# Patient Record
Sex: FEMALE | Race: WHITE | NOT HISPANIC OR LATINO | Employment: OTHER | URBAN - METROPOLITAN AREA
[De-identification: names, ages, dates, MRNs, and addresses within clinical notes are randomized per-mention and may not be internally consistent; named-entity substitution may affect disease eponyms.]

---

## 2017-01-20 ENCOUNTER — LAB VISIT (OUTPATIENT)
Dept: LAB | Facility: HOSPITAL | Age: 67
End: 2017-01-20
Attending: UROLOGY
Payer: MEDICARE

## 2017-01-20 ENCOUNTER — TELEPHONE (OUTPATIENT)
Dept: UROLOGY | Facility: CLINIC | Age: 67
End: 2017-01-20

## 2017-01-20 DIAGNOSIS — N20.0 KIDNEY STONE: ICD-10-CM

## 2017-01-20 DIAGNOSIS — N20.0 KIDNEY STONE: Primary | ICD-10-CM

## 2017-01-20 PROCEDURE — 87077 CULTURE AEROBIC IDENTIFY: CPT

## 2017-01-20 PROCEDURE — 87086 URINE CULTURE/COLONY COUNT: CPT

## 2017-01-20 PROCEDURE — 87088 URINE BACTERIA CULTURE: CPT

## 2017-01-20 PROCEDURE — 87186 SC STD MICRODIL/AGAR DIL: CPT

## 2017-01-20 NOTE — TELEPHONE ENCOUNTER
----- Message from Liza Garces sent at 1/20/2017  3:10 PM CST -----  Contact: self, [phone]  Patient called in requesting to speak with you today before she schedules an appt.  Please advise.

## 2017-01-23 ENCOUNTER — TELEPHONE (OUTPATIENT)
Dept: UROLOGY | Facility: CLINIC | Age: 67
End: 2017-01-23

## 2017-01-23 LAB — BACTERIA UR CULT: NORMAL

## 2017-01-23 RX ORDER — TETRACYCLINE HYDROCHLORIDE 500 MG/1
500 CAPSULE ORAL 2 TIMES DAILY
Qty: 20 CAPSULE | Refills: 0 | Status: SHIPPED | OUTPATIENT
Start: 2017-01-23 | End: 2023-03-10

## 2017-01-23 NOTE — TELEPHONE ENCOUNTER
----- Message from Hong Orr MD sent at 1/23/2017  2:46 PM CST -----  Ordering tetracycline.  Please call patient and notify of positive culture and antibiotics.

## 2017-02-06 ENCOUNTER — TELEPHONE (OUTPATIENT)
Dept: UROLOGY | Facility: CLINIC | Age: 67
End: 2017-02-06

## 2017-02-06 DIAGNOSIS — N32.9 LESION OF BLADDER: Primary | ICD-10-CM

## 2017-02-06 NOTE — TELEPHONE ENCOUNTER
----- Message from Maddison Jones sent at 2/6/2017  3:24 PM CST -----  Contact: self/[phone]  She needs to speak to Diana only about rescheduling an appt.

## 2017-02-06 NOTE — TELEPHONE ENCOUNTER
She called me today to set up her follow up ct scan and she tells me she never got the medication for her uti on 1/23/17, due to cost. Should we get a repeat urine culture

## 2017-03-01 ENCOUNTER — TELEPHONE (OUTPATIENT)
Dept: UROLOGY | Facility: CLINIC | Age: 67
End: 2017-03-01

## 2017-03-01 NOTE — TELEPHONE ENCOUNTER
----- Message from Bal Montague sent at 3/1/2017  2:59 PM CST -----  Contact: self  Pt called asking to speak w/ Dr Orr nurse concerning an apt. I asked if I can schedule her pt declined asking to speak to the nurse.

## 2017-03-16 ENCOUNTER — OFFICE VISIT (OUTPATIENT)
Dept: UROLOGY | Facility: CLINIC | Age: 67
End: 2017-03-16
Payer: MEDICARE

## 2017-03-16 ENCOUNTER — HOSPITAL ENCOUNTER (OUTPATIENT)
Dept: RADIOLOGY | Facility: HOSPITAL | Age: 67
Discharge: HOME | End: 2017-03-16
Attending: UROLOGY
Payer: MEDICARE

## 2017-03-16 VITALS — BODY MASS INDEX: 40.02 KG/M2 | HEIGHT: 61 IN | WEIGHT: 212 LBS

## 2017-03-16 DIAGNOSIS — N20.0 KIDNEY STONE: ICD-10-CM

## 2017-03-16 DIAGNOSIS — N20.0 NEPHROLITHIASIS: Primary | ICD-10-CM

## 2017-03-16 PROCEDURE — 74176 CT ABD & PELVIS W/O CONTRAST: CPT | Mod: 26,,, | Performed by: RADIOLOGY

## 2017-03-16 PROCEDURE — 99999 PR PBB SHADOW E&M-EST. PATIENT-LVL II: CPT | Mod: PBBFAC,,, | Performed by: UROLOGY

## 2017-03-16 PROCEDURE — 1159F MED LIST DOCD IN RCRD: CPT | Mod: S$GLB,,, | Performed by: UROLOGY

## 2017-03-16 PROCEDURE — 74176 CT ABD & PELVIS W/O CONTRAST: CPT | Mod: TC

## 2017-03-16 PROCEDURE — 1126F AMNT PAIN NOTED NONE PRSNT: CPT | Mod: S$GLB,,, | Performed by: UROLOGY

## 2017-03-16 PROCEDURE — 1160F RVW MEDS BY RX/DR IN RCRD: CPT | Mod: S$GLB,,, | Performed by: UROLOGY

## 2017-03-16 PROCEDURE — 99214 OFFICE O/P EST MOD 30 MIN: CPT | Mod: S$GLB,,, | Performed by: UROLOGY

## 2017-03-16 PROCEDURE — 99499 UNLISTED E&M SERVICE: CPT | Mod: S$GLB,,, | Performed by: UROLOGY

## 2017-03-16 PROCEDURE — 1157F ADVNC CARE PLAN IN RCRD: CPT | Mod: S$GLB,,, | Performed by: UROLOGY

## 2017-03-16 RX ORDER — POTASSIUM CITRATE 10 MEQ/1
20 TABLET, EXTENDED RELEASE ORAL 2 TIMES DAILY WITH MEALS
Qty: 120 TABLET | Refills: 11 | Status: SHIPPED | OUTPATIENT
Start: 2017-03-16 | End: 2022-02-14

## 2017-03-16 NOTE — PROGRESS NOTES
Patient, Socorro Lucas (MRN #[id number]), presented with a recorded BMI of 40.06 kg/m^2 consistent with the definition of morbid obesity (ICD-10 E66.01). The patient's morbid obesity was monitored, evaluated, addressed and/or treated. This addendum to the medical record is made on 03/16/2017.

## 2017-03-16 NOTE — PROGRESS NOTES
Subjective:      Patient ID: Socorro Lucas is a 66 y.o. female.    Chief Complaint: Results    Patient is a 66 y.o. female who is established to our clinic and was initially referred for evaluation of kidney stones.     HPI  Patient is doing well.  She denies abdominal pain.  No gross hematuria.  No fevers or chills.  No dysuria.    K usually complain of some pressure when she voids.  However, she denies any significant bother.  She admits to not drinking enough fluids.  He generally is dehydrated.    Patient had a CT scan today.  CT scan was independently reviewed today and reveals bilateral renal stones, small.  No hydronephrosis.    Review of Systems   All other systems reviewed and negative except pertinent positives noted in HPI.    Objective:     Physical Exam   Constitutional: She is oriented to person, place, and time. She appears well-developed and well-nourished. She is cooperative.  Non-toxic appearance.   HENT:   Head: Normocephalic.   Cardiovascular: Normal rate, intact distal pulses and normal pulses.    Pulmonary/Chest: Effort normal. No accessory muscle usage. No tachypnea. No respiratory distress.   Abdominal: Soft. Normal appearance. She exhibits no distension, no fluid wave, no ascites and no mass. There is no tenderness. There is no rebound and no CVA tenderness. No hernia.   Liver/spleen non-palpable   Musculoskeletal: Normal range of motion.   Neurological: She is alert and oriented to person, place, and time. She has normal strength.   Skin: No bruising and no rash noted.     Psychiatric: She has a normal mood and affect. Her speech is normal and behavior is normal. Thought content normal.     Assessment:     1. Nephrolithiasis      Plan:     1. Kidney stone:  -General risk factors for kidney stones and the conservative measures to prevent kidney stones in the future were discussed with the patient in detail.  The patient was encouraged to drink 2-3 liters of water a day, limit iced tea  and aubrey as well as foods high in oxalate.  They were cautioned to try to limit salt and red meat intake.  We also discussed adding citrate to the diet with the addition of jacque or lemon juice to their water or alternatively with crystal light.   -Imaging review:   Patient had a CT scan today.  CT scan was independently reviewed today and reveals bilateral renal stones, small.  No hydronephrosis.  -24 hour urine: reviewed with her.  Very low urine volume and citrate.    -potassium citrate prescribed.  Increased po fluids  -repeat 24 hour urine.--will call with results

## 2018-06-30 ENCOUNTER — OFFICE VISIT (OUTPATIENT)
Dept: URGENT CARE | Facility: CLINIC | Age: 68
End: 2018-06-30
Payer: MEDICARE

## 2018-06-30 VITALS
BODY MASS INDEX: 37.19 KG/M2 | OXYGEN SATURATION: 92 % | SYSTOLIC BLOOD PRESSURE: 143 MMHG | DIASTOLIC BLOOD PRESSURE: 78 MMHG | HEIGHT: 61 IN | RESPIRATION RATE: 22 BRPM | HEART RATE: 117 BPM | TEMPERATURE: 98 F | WEIGHT: 197 LBS

## 2018-06-30 DIAGNOSIS — N39.0 URINARY TRACT INFECTION WITH HEMATURIA, SITE UNSPECIFIED: Primary | ICD-10-CM

## 2018-06-30 DIAGNOSIS — R31.9 URINARY TRACT INFECTION WITH HEMATURIA, SITE UNSPECIFIED: Primary | ICD-10-CM

## 2018-06-30 DIAGNOSIS — R30.0 BURNING WITH URINATION: ICD-10-CM

## 2018-06-30 LAB
BILIRUB UR QL STRIP: POSITIVE
GLUCOSE UR QL STRIP: NEGATIVE
KETONES UR QL STRIP: NEGATIVE
LEUKOCYTE ESTERASE UR QL STRIP: POSITIVE
PH, POC UA: 6
POC BLOOD, URINE: POSITIVE
POC NITRATES, URINE: POSITIVE
PROT UR QL STRIP: POSITIVE
SP GR UR STRIP: 1.01 (ref 1–1.03)
UROBILINOGEN UR STRIP-ACNC: NORMAL (ref 0.1–1.1)

## 2018-06-30 PROCEDURE — 99214 OFFICE O/P EST MOD 30 MIN: CPT | Mod: 25,S$GLB,, | Performed by: FAMILY MEDICINE

## 2018-06-30 PROCEDURE — 81003 URINALYSIS AUTO W/O SCOPE: CPT | Mod: QW,S$GLB,, | Performed by: FAMILY MEDICINE

## 2018-06-30 RX ORDER — PHENAZOPYRIDINE HYDROCHLORIDE 100 MG/1
100 TABLET, FILM COATED ORAL 3 TIMES DAILY PRN
Qty: 9 TABLET | Refills: 0 | Status: SHIPPED | OUTPATIENT
Start: 2018-06-30 | End: 2018-07-03

## 2018-06-30 RX ORDER — CIPROFLOXACIN 500 MG/1
500 TABLET ORAL 2 TIMES DAILY
Qty: 14 TABLET | Refills: 0 | Status: SHIPPED | OUTPATIENT
Start: 2018-06-30 | End: 2018-07-07

## 2018-06-30 NOTE — PROGRESS NOTES
"Subjective:       Patient ID: Socorro Lucas is a 67 y.o. female.    Vitals:  height is 5' 1" (1.549 m) and weight is 89.4 kg (197 lb). Her oral temperature is 97.6 °F (36.4 °C). Her blood pressure is 143/78 (abnormal) and her pulse is 117 (abnormal). Her respiration is 22 (abnormal) and oxygen saturation is 92% (abnormal).     Chief Complaint: Urinary Tract Infection    This is a 67 y.o. female with Past Medical History:  No date: COPD (chronic obstructive pulmonary disease)  left leg post cholecystectomy: DVT (deep venous thrombosis)  No date: Hypertension  No date: Hypothyroidism  No date: Kidney stone  No date: Thyroid disease  No date: Urinary tract infection  No date: Vaginal infection and Past Surgical History:  No date: APPENDECTOMY  No date: BREAST SURGERY      Comment: biopsy only x 6  No date: CARPAL TUNNEL RELEASE Bilateral  No date: CHOLECYSTECTOMY  No date: CYSTOSCOPY  No date: CHARI FILTER PLACEMENT   who presents today with a chief complaint of UTI since last night.  Patient states she has a lot of burning upon urination (pain level 9/10).  She is burning even when just sitting.  She took AZO this morning.   She is now complaining of chills.        Urinary Tract Infection    This is a new problem. The current episode started yesterday. The problem occurs every urination. The problem has been gradually worsening. The quality of the pain is described as burning. The pain is at a severity of 9/10. The pain is severe. She is not sexually active. There is no history of pyelonephritis. Associated symptoms include frequency. Pertinent negatives include no chills, hematuria, nausea, urgency or vomiting. Treatments tried: AZO. The treatment provided no relief.     Review of Systems   Constitution: Negative for chills and fever.   Skin: Negative for itching.   Musculoskeletal: Negative for back pain.   Gastrointestinal: Negative for abdominal pain, nausea and vomiting.   Genitourinary: Positive for " dysuria and frequency. Negative for genital sores, hematuria, missed menses, non-menstrual bleeding and urgency.       Objective:      Physical Exam   Constitutional: She appears well-developed and well-nourished.   HENT:   Head: Normocephalic and atraumatic.   Eyes: EOM are normal. Pupils are equal, round, and reactive to light.   Neck: Normal range of motion. Neck supple.   Cardiovascular: Normal rate, regular rhythm and normal heart sounds.    Pulmonary/Chest: Effort normal and breath sounds normal.   Abdominal: Soft. There is no tenderness.   Nursing note and vitals reviewed.      Results for orders placed or performed in visit on 06/30/18   POCT Urinalysis, Dipstick, Automated, W/O Scope   Result Value Ref Range    POC Blood, Urine Positive (A) Negative    POC Bilirubin, Urine Positive (A) Negative    POC Urobilinogen, Urine Normal 0.1 - 1.1    POC Ketones, Urine Negative Negative    POC Protein, Urine Positive (A) Negative    POC Nitrates, Urine Positive (A) Negative    POC Glucose, Urine Negative Negative    pH, UA 6.0     POC Specific Gravity, Urine 1.015 1.003 - 1.029    POC Leukocytes, Urine Positive (A) Negative     Assessment:       1. Urinary tract infection with hematuria, site unspecified    2. Burning with urination        Plan:         Urinary tract infection with hematuria, site unspecified  -     ciprofloxacin HCl (CIPRO) 500 MG tablet; Take 1 tablet (500 mg total) by mouth 2 (two) times daily. for 7 days  Dispense: 14 tablet; Refill: 0  -     phenazopyridine (PYRIDIUM) 100 MG tablet; Take 1 tablet (100 mg total) by mouth 3 (three) times daily as needed.  Dispense: 9 tablet; Refill: 0  -     Urine culture    Burning with urination  -     POCT Urinalysis, Dipstick, Automated, W/O Scope

## 2018-06-30 NOTE — PATIENT INSTRUCTIONS

## 2018-07-09 ENCOUNTER — TELEPHONE (OUTPATIENT)
Dept: URGENT CARE | Facility: CLINIC | Age: 68
End: 2018-07-09

## 2018-07-09 LAB
BACTERIA UR CULT: ABNORMAL
BACTERIA UR CULT: ABNORMAL
OTHER ANTIBIOTIC SUSC ISLT: ABNORMAL

## 2018-07-09 NOTE — TELEPHONE ENCOUNTER
Patient says her symptoms have completely resolved.  She was informed of her positive culture results and was treated appropriately.

## 2019-01-22 DIAGNOSIS — R07.9 CHEST PAIN: ICD-10-CM

## 2019-01-22 DIAGNOSIS — R05.9 COUGH: Primary | ICD-10-CM

## 2019-06-16 ENCOUNTER — OFFICE VISIT (OUTPATIENT)
Dept: URGENT CARE | Facility: CLINIC | Age: 69
End: 2019-06-16
Payer: MEDICARE

## 2019-06-16 VITALS
HEIGHT: 61 IN | BODY MASS INDEX: 35.87 KG/M2 | SYSTOLIC BLOOD PRESSURE: 106 MMHG | RESPIRATION RATE: 20 BRPM | TEMPERATURE: 98 F | OXYGEN SATURATION: 94 % | WEIGHT: 190 LBS | DIASTOLIC BLOOD PRESSURE: 63 MMHG | HEART RATE: 88 BPM

## 2019-06-16 DIAGNOSIS — J44.1 CHRONIC OBSTRUCTIVE PULMONARY DISEASE WITH ACUTE EXACERBATION: Primary | ICD-10-CM

## 2019-06-16 PROCEDURE — 99214 OFFICE O/P EST MOD 30 MIN: CPT | Mod: 25,S$GLB,, | Performed by: FAMILY MEDICINE

## 2019-06-16 PROCEDURE — 96372 THER/PROPH/DIAG INJ SC/IM: CPT | Mod: S$GLB,,, | Performed by: FAMILY MEDICINE

## 2019-06-16 PROCEDURE — 96372 PR INJECTION,THERAP/PROPH/DIAG2ST, IM OR SUBCUT: ICD-10-PCS | Mod: S$GLB,,, | Performed by: FAMILY MEDICINE

## 2019-06-16 PROCEDURE — 99214 PR OFFICE/OUTPT VISIT, EST, LEVL IV, 30-39 MIN: ICD-10-PCS | Mod: 25,S$GLB,, | Performed by: FAMILY MEDICINE

## 2019-06-16 RX ORDER — DOXYCYCLINE 100 MG/1
100 CAPSULE ORAL 2 TIMES DAILY
Qty: 14 CAPSULE | Refills: 0 | Status: SHIPPED | OUTPATIENT
Start: 2019-06-16 | End: 2019-06-23

## 2019-06-16 RX ORDER — BETAMETHASONE SODIUM PHOSPHATE AND BETAMETHASONE ACETATE 3; 3 MG/ML; MG/ML
6 INJECTION, SUSPENSION INTRA-ARTICULAR; INTRALESIONAL; INTRAMUSCULAR; SOFT TISSUE
Status: COMPLETED | OUTPATIENT
Start: 2019-06-16 | End: 2019-06-16

## 2019-06-16 RX ADMIN — BETAMETHASONE SODIUM PHOSPHATE AND BETAMETHASONE ACETATE 6 MG: 3; 3 INJECTION, SUSPENSION INTRA-ARTICULAR; INTRALESIONAL; INTRAMUSCULAR; SOFT TISSUE at 09:06

## 2019-06-16 NOTE — PROGRESS NOTES
"Subjective:       Patient ID: Socorro Lucas is a 68 y.o. female.    Vitals:  height is 5' 1" (1.549 m) and weight is 86.2 kg (190 lb). Her oral temperature is 97.7 °F (36.5 °C). Her blood pressure is 106/63 and her pulse is 88. Her respiration is 20 and oxygen saturation is 94% (abnormal).     Chief Complaint: Cough    This is a 68 y.o. female who presents today with a chief complaint of cough and runny nose since Friday night.  Patient states she is coughing up mucous.  She has COPD, but she states she is not any more SOB than normal.      URI    This is a new problem. The current episode started in the past 7 days (Friday Night). The problem has been gradually worsening. There has been no fever. Associated symptoms include congestion, coughing and rhinorrhea. Pertinent negatives include no ear pain, nausea, rash, sinus pain, sore throat, vomiting or wheezing. She has tried nothing for the symptoms.       Constitution: Negative for chills, sweating, fatigue and fever.   HENT: Positive for congestion. Negative for ear pain, sinus pain, sinus pressure, sore throat and voice change.         Runny Nose   Neck: Negative for painful lymph nodes.   Eyes: Negative for eye redness.   Respiratory: Positive for cough and sputum production. Negative for chest tightness, bloody sputum, COPD, shortness of breath, stridor, wheezing and asthma.    Gastrointestinal: Negative for nausea and vomiting.   Musculoskeletal: Negative for muscle ache.   Skin: Negative for rash.   Allergic/Immunologic: Negative for seasonal allergies and asthma.   Hematologic/Lymphatic: Negative for swollen lymph nodes.       Objective:      Physical Exam   Constitutional: She appears well-developed and well-nourished.   HENT:   Head: Normocephalic and atraumatic.   Mouth/Throat: Posterior oropharyngeal erythema present.   Eyes: Pupils are equal, round, and reactive to light. EOM are normal.   Neck: Normal range of motion. Neck supple. "   Cardiovascular: Normal rate, regular rhythm and normal heart sounds.   Pulmonary/Chest: Effort normal. No respiratory distress. She has wheezes (scant).   Nursing note and vitals reviewed.      Assessment:       1. Chronic obstructive pulmonary disease with acute exacerbation        Plan:         Chronic obstructive pulmonary disease with acute exacerbation  -     doxycycline (VIBRAMYCIN) 100 MG Cap; Take 1 capsule (100 mg total) by mouth 2 (two) times daily. for 7 days  Dispense: 14 capsule; Refill: 0  -     betamethasone acetate-betamethasone sodium phosphate injection 6 mg    continue to use albuterol as previously prescribed. RTC or go to the ER for worsening symptoms

## 2019-06-16 NOTE — PATIENT INSTRUCTIONS
COPD Flare    You have had a flare-up of your COPD.  COPD, or chronic obstructive pulmonary disease, is a common lung disease. It causes your airways to become irritated and narrower. This makes it harder for you to breathe. Emphysema and chronic bronchitis are both types of COPD. This is a chronic condition, which means you always have it. Sometimes it gets worse. When this happens, it is called a flare-up.  Symptoms of COPD  People with COPD may have symptoms most of the time. In a flare-up, your symptoms get worse. These symptoms may mean you are having a flare-up:  · Shortness of breath, shallow or rapid breathing, or wheezing that gets worse  · Lung infection  · Cough that gets worse  · More mucus, thicker mucus or mucus of a different color  · Tiredness, decreased energy, or trouble doing your usual activities  · Fever  · Chest tightness  · Your symptoms dont get better even when you use your usual medicines, inhalers, and nebulizer  · Trouble talking  · You feel confused  Causes of flare-ups  Unfortunately, a flare-up can happen even though you did everything right, and you followed your doctors instructions. Some causes of flare-ups are:  · Smoking or secondhand smoke  · Colds, the flu, or respiratory infections  · Air pollution  · Sudden change in the weather  · Dust, irritating chemicals, or strong fumes  · Not taking your medicines as prescribed  Home care  Here are some things you can do at home to treat a flare-up:  · Try not to panic. This makes it harder to breathe, and keeps you from doing the right things.  · Dont smoke or be around others who are smoking.  · Try to drink more fluids than usual during a flare-up, unless your doctor has told you not to because of heart and kidney problems. More fluids can help loosen the mucus.  · Use your inhalers and nebulizer, if you have one, as you have been told to.  · If you were given antibiotics, take them until they are used up or your doctor tells you  to stop. Its important to finish the antibiotics, even though you feel better. This will make sure the infection has cleared.  · If you were given prednisone or another steroid, finish it even if you feel better.  Preventing a flare-up  Even though flare-ups happen, the best way to treat one is to prevent it before it starts. Here are some pointers:  · Dont smoke or be around others who are smoking.  · Take your medicines as you have been told.  · Talk with your doctor about getting a flu shot every year. Also find out if you need a pneumonia shot.  · If there is a weather advisory warning to stay indoors, try to stay inside when possible.  · Try to eat healthy and get plenty of sleep.  · Try to avoid things that usually set you off, like dust, chemical fumes, hairsprays, or strong perfumes.  Follow-up care  Follow up with your healthcare provider, or as advised.  If a culture was done, you will be told if your treatment needs to be changed. You can call as directed for the results.  If X-rays were done, you will be notified of any new findings that may affect your care.  Call 911  Call 911 if any of these occur:  · You have trouble breathing  · You feel confused or its difficult to wake you up  · You faint or lose consciousness  · You have a rapid heart rate  · You have new pain in your chest, arm, shoulder, neck or upper back  When to seek medical advice  Call your healthcare provider right away if any of these occur:  · Wheezing or shortness of breath gets worse  · You need to use your inhalers more often than usual without relief  · Fever of 100.4°F (38ºC) or higher, or as directed by your healthcare provider  · Coughing up lots of dark-colored or bloody mucus (sputum)  · Chest pain with each breath  · You do not start to get better within 24 hours  · Swelling of your ankles gets worse  · Dizziness or weakness  Date Last Reviewed: 9/1/2016  © 1168-5663 [employer]. [address] [facility],  [place] [address]. All rights reserved. This information is not intended as a substitute for professional medical care. Always follow your healthcare professional's instructions.

## 2019-06-17 ENCOUNTER — TELEPHONE (OUTPATIENT)
Dept: URGENT CARE | Facility: CLINIC | Age: 69
End: 2019-06-17

## 2019-06-17 DIAGNOSIS — J44.1 COPD EXACERBATION: Primary | ICD-10-CM

## 2019-06-17 RX ORDER — CEFDINIR 300 MG/1
300 CAPSULE ORAL 2 TIMES DAILY
Qty: 20 CAPSULE | Refills: 0 | Status: SHIPPED | OUTPATIENT
Start: 2019-06-17 | End: 2019-06-27

## 2019-06-17 NOTE — TELEPHONE ENCOUNTER
Pt presents to clinic stating she became flushed and nauseated after taking first dose doxy yesterday and would like antibiotic to be switched. She denies any worsening symptoms (fever, SOB, worsening cough). She was diagnosed with COPD exacerbation and is requesting a different antibiotic. Patient is allergic to sulfa. Switching antibiotic to cephalosporin. Advised patient to RTC to for any new or worsening symptoms.

## 2021-02-27 ENCOUNTER — IMMUNIZATION (OUTPATIENT)
Dept: INTERNAL MEDICINE | Facility: CLINIC | Age: 71
End: 2021-02-27
Payer: MEDICARE

## 2021-02-27 DIAGNOSIS — Z23 NEED FOR VACCINATION: Primary | ICD-10-CM

## 2021-02-27 PROCEDURE — 91300 COVID-19, MRNA, LNP-S, PF, 30 MCG/0.3 ML DOSE VACCINE: CPT | Mod: PBBFAC | Performed by: FAMILY MEDICINE

## 2021-03-20 ENCOUNTER — IMMUNIZATION (OUTPATIENT)
Dept: INTERNAL MEDICINE | Facility: CLINIC | Age: 71
End: 2021-03-20
Payer: MEDICARE

## 2021-03-20 DIAGNOSIS — Z23 NEED FOR VACCINATION: Primary | ICD-10-CM

## 2021-03-20 PROCEDURE — 91300 COVID-19, MRNA, LNP-S, PF, 30 MCG/0.3 ML DOSE VACCINE: CPT | Mod: PBBFAC | Performed by: FAMILY MEDICINE

## 2021-03-20 PROCEDURE — 0002A COVID-19, MRNA, LNP-S, PF, 30 MCG/0.3 ML DOSE VACCINE: CPT | Mod: PBBFAC | Performed by: FAMILY MEDICINE

## 2021-04-22 DIAGNOSIS — H53.2 DOUBLE VISION: Primary | ICD-10-CM

## 2021-04-30 ENCOUNTER — HOSPITAL ENCOUNTER (OUTPATIENT)
Dept: RADIOLOGY | Facility: HOSPITAL | Age: 71
Discharge: HOME | End: 2021-04-30
Attending: FAMILY MEDICINE
Payer: MEDICARE

## 2021-04-30 DIAGNOSIS — H53.8 BLURRY VISION: ICD-10-CM

## 2021-04-30 DIAGNOSIS — H53.2 DOUBLE VISION: ICD-10-CM

## 2021-04-30 PROCEDURE — 70450 CT HEAD WITHOUT CONTRAST: ICD-10-PCS | Mod: 26,,, | Performed by: SPECIALIST

## 2021-04-30 PROCEDURE — 70450 CT HEAD/BRAIN W/O DYE: CPT | Mod: 26,,, | Performed by: SPECIALIST

## 2021-04-30 PROCEDURE — 70450 CT HEAD/BRAIN W/O DYE: CPT | Mod: TC

## 2022-02-14 ENCOUNTER — LAB VISIT (OUTPATIENT)
Dept: LAB | Facility: HOSPITAL | Age: 72
End: 2022-02-14
Attending: UROLOGY
Payer: MEDICARE

## 2022-02-14 ENCOUNTER — OFFICE VISIT (OUTPATIENT)
Dept: UROLOGY | Facility: CLINIC | Age: 72
End: 2022-02-14
Payer: MEDICARE

## 2022-02-14 VITALS
HEART RATE: 76 BPM | HEIGHT: 61 IN | WEIGHT: 203.38 LBS | DIASTOLIC BLOOD PRESSURE: 69 MMHG | BODY MASS INDEX: 38.4 KG/M2 | SYSTOLIC BLOOD PRESSURE: 148 MMHG

## 2022-02-14 DIAGNOSIS — N39.0 RECURRENT UTI: Primary | ICD-10-CM

## 2022-02-14 DIAGNOSIS — N39.0 URINARY TRACT INFECTION WITH HEMATURIA, SITE UNSPECIFIED: ICD-10-CM

## 2022-02-14 DIAGNOSIS — N20.0 KIDNEY STONES: ICD-10-CM

## 2022-02-14 DIAGNOSIS — R30.0 DYSURIA: ICD-10-CM

## 2022-02-14 DIAGNOSIS — R31.9 URINARY TRACT INFECTION WITH HEMATURIA, SITE UNSPECIFIED: ICD-10-CM

## 2022-02-14 DIAGNOSIS — R31.0 GROSS HEMATURIA: ICD-10-CM

## 2022-02-14 LAB
ANION GAP SERPL CALC-SCNC: 11 MMOL/L (ref 8–16)
BILIRUB SERPL-MCNC: ABNORMAL MG/DL
BLOOD URINE, POC: 250
BUN SERPL-MCNC: 24 MG/DL (ref 8–23)
CALCIUM SERPL-MCNC: 10.5 MG/DL (ref 8.7–10.5)
CHLORIDE SERPL-SCNC: 107 MMOL/L (ref 95–110)
CLARITY, POC UA: ABNORMAL
CO2 SERPL-SCNC: 25 MMOL/L (ref 23–29)
COLOR, POC UA: ABNORMAL
CREAT SERPL-MCNC: 1.3 MG/DL (ref 0.5–1.4)
EST. GFR  (AFRICAN AMERICAN): 48 ML/MIN/1.73 M^2
EST. GFR  (NON AFRICAN AMERICAN): 41 ML/MIN/1.73 M^2
GLUCOSE SERPL-MCNC: 92 MG/DL (ref 70–110)
GLUCOSE UR QL STRIP: ABNORMAL
KETONES UR QL STRIP: ABNORMAL
LEUKOCYTE ESTERASE URINE, POC: ABNORMAL
MICROSCOPIC COMMENT: ABNORMAL
NITRITE, POC UA: ABNORMAL
PH, POC UA: 5
POC RESIDUAL URINE VOLUME: 324 ML (ref 0–100)
POTASSIUM SERPL-SCNC: 4.7 MMOL/L (ref 3.5–5.1)
PROTEIN, POC: ABNORMAL
SODIUM SERPL-SCNC: 143 MMOL/L (ref 136–145)
SPECIFIC GRAVITY, POC UA: 1.01
UROBILINOGEN, POC UA: ABNORMAL
WBC #/AREA URNS AUTO: >100 /HPF (ref 0–5)
WBC CLUMPS UR QL AUTO: ABNORMAL

## 2022-02-14 PROCEDURE — 51798 US URINE CAPACITY MEASURE: CPT | Mod: S$GLB,,, | Performed by: UROLOGY

## 2022-02-14 PROCEDURE — 1125F AMNT PAIN NOTED PAIN PRSNT: CPT | Mod: CPTII,S$GLB,, | Performed by: UROLOGY

## 2022-02-14 PROCEDURE — 1159F PR MEDICATION LIST DOCUMENTED IN MEDICAL RECORD: ICD-10-PCS | Mod: CPTII,S$GLB,, | Performed by: UROLOGY

## 2022-02-14 PROCEDURE — 1125F PR PAIN SEVERITY QUANTIFIED, PAIN PRESENT: ICD-10-PCS | Mod: CPTII,S$GLB,, | Performed by: UROLOGY

## 2022-02-14 PROCEDURE — 1160F PR REVIEW ALL MEDS BY PRESCRIBER/CLIN PHARMACIST DOCUMENTED: ICD-10-PCS | Mod: CPTII,S$GLB,, | Performed by: UROLOGY

## 2022-02-14 PROCEDURE — 4010F ACE/ARB THERAPY RXD/TAKEN: CPT | Mod: CPTII,S$GLB,, | Performed by: UROLOGY

## 2022-02-14 PROCEDURE — 3078F PR MOST RECENT DIASTOLIC BLOOD PRESSURE < 80 MM HG: ICD-10-PCS | Mod: CPTII,S$GLB,, | Performed by: UROLOGY

## 2022-02-14 PROCEDURE — 99204 PR OFFICE/OUTPT VISIT, NEW, LEVL IV, 45-59 MIN: ICD-10-PCS | Mod: S$GLB,,, | Performed by: UROLOGY

## 2022-02-14 PROCEDURE — 99999 PR PBB SHADOW E&M-EST. PATIENT-LVL III: ICD-10-PCS | Mod: PBBFAC,,, | Performed by: UROLOGY

## 2022-02-14 PROCEDURE — 36415 COLL VENOUS BLD VENIPUNCTURE: CPT | Performed by: UROLOGY

## 2022-02-14 PROCEDURE — 99204 OFFICE O/P NEW MOD 45 MIN: CPT | Mod: S$GLB,,, | Performed by: UROLOGY

## 2022-02-14 PROCEDURE — 81002 URINALYSIS NONAUTO W/O SCOPE: CPT | Mod: S$GLB,,, | Performed by: UROLOGY

## 2022-02-14 PROCEDURE — 3288F PR FALLS RISK ASSESSMENT DOCUMENTED: ICD-10-PCS | Mod: CPTII,S$GLB,, | Performed by: UROLOGY

## 2022-02-14 PROCEDURE — 3008F PR BODY MASS INDEX (BMI) DOCUMENTED: ICD-10-PCS | Mod: CPTII,S$GLB,, | Performed by: UROLOGY

## 2022-02-14 PROCEDURE — 1101F PR PT FALLS ASSESS DOC 0-1 FALLS W/OUT INJ PAST YR: ICD-10-PCS | Mod: CPTII,S$GLB,, | Performed by: UROLOGY

## 2022-02-14 PROCEDURE — 1159F MED LIST DOCD IN RCRD: CPT | Mod: CPTII,S$GLB,, | Performed by: UROLOGY

## 2022-02-14 PROCEDURE — 81001 URINALYSIS AUTO W/SCOPE: CPT | Performed by: UROLOGY

## 2022-02-14 PROCEDURE — 80048 BASIC METABOLIC PNL TOTAL CA: CPT | Performed by: UROLOGY

## 2022-02-14 PROCEDURE — 3077F SYST BP >= 140 MM HG: CPT | Mod: CPTII,S$GLB,, | Performed by: UROLOGY

## 2022-02-14 PROCEDURE — 87077 CULTURE AEROBIC IDENTIFY: CPT | Performed by: UROLOGY

## 2022-02-14 PROCEDURE — 3077F PR MOST RECENT SYSTOLIC BLOOD PRESSURE >= 140 MM HG: ICD-10-PCS | Mod: CPTII,S$GLB,, | Performed by: UROLOGY

## 2022-02-14 PROCEDURE — 81002 POCT URINE DIPSTICK WITHOUT MICROSCOPE: ICD-10-PCS | Mod: S$GLB,,, | Performed by: UROLOGY

## 2022-02-14 PROCEDURE — 51798 POCT BLADDER SCAN: ICD-10-PCS | Mod: S$GLB,,, | Performed by: UROLOGY

## 2022-02-14 PROCEDURE — 87088 URINE BACTERIA CULTURE: CPT | Performed by: UROLOGY

## 2022-02-14 PROCEDURE — 1160F RVW MEDS BY RX/DR IN RCRD: CPT | Mod: CPTII,S$GLB,, | Performed by: UROLOGY

## 2022-02-14 PROCEDURE — 3008F BODY MASS INDEX DOCD: CPT | Mod: CPTII,S$GLB,, | Performed by: UROLOGY

## 2022-02-14 PROCEDURE — 4010F PR ACE/ARB THEARPY RXD/TAKEN: ICD-10-PCS | Mod: CPTII,S$GLB,, | Performed by: UROLOGY

## 2022-02-14 PROCEDURE — 99999 PR PBB SHADOW E&M-EST. PATIENT-LVL III: CPT | Mod: PBBFAC,,, | Performed by: UROLOGY

## 2022-02-14 PROCEDURE — 1101F PT FALLS ASSESS-DOCD LE1/YR: CPT | Mod: CPTII,S$GLB,, | Performed by: UROLOGY

## 2022-02-14 PROCEDURE — 87086 URINE CULTURE/COLONY COUNT: CPT | Performed by: UROLOGY

## 2022-02-14 PROCEDURE — 87186 SC STD MICRODIL/AGAR DIL: CPT | Performed by: UROLOGY

## 2022-02-14 PROCEDURE — 3288F FALL RISK ASSESSMENT DOCD: CPT | Mod: CPTII,S$GLB,, | Performed by: UROLOGY

## 2022-02-14 PROCEDURE — 3078F DIAST BP <80 MM HG: CPT | Mod: CPTII,S$GLB,, | Performed by: UROLOGY

## 2022-02-14 RX ORDER — FOLIC ACID 0.8 MG
800 TABLET ORAL DAILY
COMMUNITY

## 2022-02-14 RX ORDER — FUROSEMIDE 40 MG/1
40 TABLET ORAL
COMMUNITY
End: 2022-08-17

## 2022-02-14 RX ORDER — METOPROLOL SUCCINATE 25 MG/1
25 TABLET, EXTENDED RELEASE ORAL DAILY
COMMUNITY

## 2022-02-14 RX ORDER — ACETAMINOPHEN 500 MG
500 TABLET ORAL EVERY 6 HOURS PRN
Status: ON HOLD | COMMUNITY
End: 2023-04-07

## 2022-02-14 RX ORDER — LEVOTHYROXINE SODIUM 125 UG/1
125 TABLET ORAL
COMMUNITY
End: 2023-11-24 | Stop reason: SDUPTHER

## 2022-02-14 RX ORDER — POTASSIUM CHLORIDE 750 MG/1
10 TABLET, EXTENDED RELEASE ORAL
COMMUNITY
End: 2022-04-12 | Stop reason: DRUGHIGH

## 2022-02-14 RX ORDER — CEPHALEXIN 500 MG/1
500 CAPSULE ORAL 4 TIMES DAILY
Qty: 12 CAPSULE | Refills: 0 | Status: SHIPPED | OUTPATIENT
Start: 2022-02-14 | End: 2022-02-17

## 2022-02-14 RX ORDER — LISINOPRIL 40 MG/1
40 TABLET ORAL DAILY
Status: ON HOLD | COMMUNITY
End: 2023-04-07 | Stop reason: HOSPADM

## 2022-02-14 NOTE — PROGRESS NOTES
Subjective:       Patient ID: Socorro Lucas is a 71 y.o. female.    Chief Complaint: No chief complaint on file.     This is a 71 y.o.  female patient that is new to me.  The patient was referred to me by PCP for multiple issues: recurrent UTI, dysuria, hematuria, h/o stones.  H/o recurrent UTIs, no cultures available.  States currently with pain to lower abdomen.  No flank pain.  Recently had hematuria with UTI per report, dark red urine with small clots, past smoker.  Reports UTI every 1-2 months and treated with antibiotic by PCP.  Symptoms: abd pain, frequency, urgency, gross hematuria.  H/o stones in  and 2016 requiring intervention,  Started K citrate at the time.      PVR today 324.  Myrbetriq started recently by PCP, also on tamsulosin.     Lab Results   Component Value Date    CREATININE 1.0 2017       ---  Past Medical History:   Diagnosis Date    COPD (chronic obstructive pulmonary disease)     DVT (deep venous thrombosis) left leg post cholecystectomy    Hypertension     Hypothyroidism     Kidney stone     Thyroid disease     Urinary tract infection     Vaginal infection        Past Surgical History:   Procedure Laterality Date    APPENDECTOMY      BREAST SURGERY      biopsy only x 6    CARPAL TUNNEL RELEASE Bilateral     CHOLECYSTECTOMY      CYSTOSCOPY      CHARI FILTER PLACEMENT         Family History   Problem Relation Age of Onset    No Known Problems Mother     No Known Problems Father     Kidney disease Neg Hx        Social History     Tobacco Use    Smoking status: Former Smoker     Quit date: 1996     Years since quittin.1    Smokeless tobacco: Never Used   Substance Use Topics    Alcohol use: No     Alcohol/week: 0.0 standard drinks    Drug use: No       Current Outpatient Medications on File Prior to Visit   Medication Sig Dispense Refill    albuterol (PROVENTIL/VENTOLIN HFA) 90 mcg/actuation inhaler Inhale 2 puffs into the lungs every 6  (six) hours as needed for Wheezing.      gabapentin (NEURONTIN) 100 MG capsule Take 100 mg by mouth 2 (two) times daily.      hydrochlorothiazide (HYDRODIURIL) 25 MG tablet Take 25 mg by mouth once daily.      levothyroxine (SYNTHROID) 150 MCG tablet Take 150 mcg by mouth once daily.      lisinopril (PRINIVIL,ZESTRIL) 20 MG tablet Take 20 mg by mouth once daily.      promethazine (PHENERGAN) 25 MG tablet Take 1 tablet (25 mg total) by mouth every 4 (four) hours. 20 tablet 0    tamsulosin (FLOMAX) 0.4 mg Cap Take 0.4 mg by mouth once daily.      tetracycline (ACHROMYCIN,SUMYCIN) 500 MG capsule Take 1 capsule (500 mg total) by mouth 2 (two) times daily. 20 capsule 0    tiotropium (SPIRIVA) 18 mcg inhalation capsule Inhale 18 mcg into the lungs once daily.      tramadol (ULTRAM) 50 mg tablet Take 50 mg by mouth every 6 (six) hours as needed for Pain.      potassium citrate (UROCIT-K) 10 mEq (1,080 mg) TbSR Take 2 tablets (20 mEq total) by mouth 2 (two) times daily with meals. 120 tablet 11     No current facility-administered medications on file prior to visit.       Review of patient's allergies indicates:   Allergen Reactions    Macrobid [nitrofurantoin monohyd/m-cryst] Swelling    Sulfa (sulfonamide antibiotics) Anaphylaxis    Asa [aspirin] Other (See Comments)    Codeine Nausea Only       Review of Systems   Constitutional: Positive for activity change and appetite change. Negative for chills and fever.   Respiratory: Negative for cough, chest tightness and shortness of breath.    Cardiovascular: Negative for chest pain.   Gastrointestinal: Positive for abdominal pain. Negative for abdominal distention, nausea and vomiting.   Genitourinary: Positive for difficulty urinating, dysuria, frequency, hematuria, pelvic pain and urgency. Negative for flank pain.   Neurological: Negative for dizziness.   Hematological: Does not bruise/bleed easily.   Psychiatric/Behavioral: Negative for agitation.        Objective:      Physical Exam  HENT:      Head: Normocephalic.   Cardiovascular:      Pulses: Normal pulses.   Pulmonary:      Effort: Pulmonary effort is normal.   Abdominal:      General: Abdomen is flat. There is no distension.      Palpations: Abdomen is soft.      Tenderness: There is no abdominal tenderness. There is no right CVA tenderness, left CVA tenderness or guarding.   Musculoskeletal:      Cervical back: Normal range of motion.   Skin:     General: Skin is warm.   Neurological:      General: No focal deficit present.      Mental Status: She is alert.         Assessment:     Problem Noted   Gross Hematuria 2/14/2022    With recent, UTI, past smoker.      Recurrent Uti 2/14/2022    Per report no cultures recently in system     Kidney Stones 10/7/2015    H/o this in past     Nephrolithiasis (Resolved) 9/14/2016   Lesion of Bladder (Resolved) 9/14/2016       Plan:     1. Straight cath urine for urine culture and UA microscopy, accurate PVR  2. CT urogram and cystoscopy given gross hematuria (unclear if UTI at time)  3. PVR elevated discussed intermittent self cath vs indwelling chino vs further follow up as above (possible UDS in future).  Does not want indwelling or self cath, understands risks  4. Will start antibiotic empirically based on UA    Ashu Riley MD

## 2022-02-16 ENCOUNTER — HOSPITAL ENCOUNTER (OUTPATIENT)
Dept: RADIOLOGY | Facility: HOSPITAL | Age: 72
Discharge: HOME | End: 2022-02-16
Attending: UROLOGY
Payer: MEDICARE

## 2022-02-16 DIAGNOSIS — R31.0 GROSS HEMATURIA: ICD-10-CM

## 2022-02-16 DIAGNOSIS — N39.0 URINARY TRACT INFECTION WITH HEMATURIA, SITE UNSPECIFIED: ICD-10-CM

## 2022-02-16 DIAGNOSIS — R31.9 URINARY TRACT INFECTION WITH HEMATURIA, SITE UNSPECIFIED: ICD-10-CM

## 2022-02-16 DIAGNOSIS — N20.0 KIDNEY STONES: ICD-10-CM

## 2022-02-16 PROCEDURE — 74178 CT UROGRAM ABD PELVIS W WO: ICD-10-PCS | Mod: 26,,, | Performed by: RADIOLOGY

## 2022-02-16 PROCEDURE — 25500020 PHARM REV CODE 255: Performed by: UROLOGY

## 2022-02-16 PROCEDURE — 74178 CT ABD&PLV WO CNTR FLWD CNTR: CPT | Mod: 26,,, | Performed by: RADIOLOGY

## 2022-02-16 PROCEDURE — 74178 CT ABD&PLV WO CNTR FLWD CNTR: CPT | Mod: TC

## 2022-02-16 RX ADMIN — IOHEXOL 150 ML: 350 INJECTION, SOLUTION INTRAVENOUS at 11:02

## 2022-02-17 ENCOUNTER — TELEPHONE (OUTPATIENT)
Dept: UROLOGY | Facility: CLINIC | Age: 72
End: 2022-02-17
Payer: MEDICARE

## 2022-02-17 DIAGNOSIS — N39.0 RECURRENT UTI: Primary | ICD-10-CM

## 2022-02-17 LAB — BACTERIA UR CULT: ABNORMAL

## 2022-02-17 RX ORDER — AMPICILLIN 500 MG/1
500 CAPSULE ORAL 4 TIMES DAILY
Qty: 20 CAPSULE | Refills: 0 | Status: SHIPPED | OUTPATIENT
Start: 2022-02-17 | End: 2022-02-22

## 2022-02-17 NOTE — TELEPHONE ENCOUNTER
Patient informed and voiced understanding.  Called in ampicillin prescription to [facility] on [address] instead of [facility] (updated and removed from chart)  Confirmed transmission.

## 2022-02-17 NOTE — PROGRESS NOTES
Call patient please, tell her to stop cephalexin.  Start ampicillin (sent to pharmacy), reschedule cystoscopy to next week

## 2022-02-17 NOTE — TELEPHONE ENCOUNTER
----- Message from Ashu Riley MD sent at 2/17/2022  1:34 PM CST -----  Call patient please, tell her to stop cephalexin.  Start ampicillin (sent to pharmacy), reschedule cystoscopy to next week

## 2022-03-07 NOTE — PROGRESS NOTES
Subjective:       Patient ID: Socorro Lucas is a 71 y.o. female.    Chief Complaint: No chief complaint on file.     This is a 71 y.o.  female patient that is new to me.  The patient was referred to me by PCP for multiple issues: recurrent UTI, dysuria, hematuria, h/o stones.  H/o recurrent UTIs, no cultures available.  States currently with pain to lower abdomen.  No flank pain.  Recently had hematuria with UTI per report, dark red urine with small clots, past smoker.  Reports UTI every 1-2 months and treated with antibiotic by PCP.  Symptoms: abd pain, frequency, urgency, gross hematuria.  H/o stones in  and 2016 requiring intervention,  Started K citrate at the time.  .  Myrbetriq started recently by PCP, also on tamsulosin.     3/8/22 follow up CT scan, cystoscopy today    Lab Results   Component Value Date    CREATININE 1.3 2022       ---  Past Medical History:   Diagnosis Date    COPD (chronic obstructive pulmonary disease)     DVT (deep venous thrombosis) left leg post cholecystectomy    Hypertension     Hypothyroidism     Kidney stone     Thyroid disease     Urinary tract infection     Vaginal infection        Past Surgical History:   Procedure Laterality Date    APPENDECTOMY      BREAST SURGERY      biopsy only x 6    CARPAL TUNNEL RELEASE Bilateral     CHOLECYSTECTOMY      CYSTOSCOPY      CHARI FILTER PLACEMENT         Family History   Problem Relation Age of Onset    No Known Problems Mother     No Known Problems Father     Kidney disease Neg Hx        Social History     Tobacco Use    Smoking status: Former Smoker     Quit date: 1996     Years since quittin.1    Smokeless tobacco: Never Used   Substance Use Topics    Alcohol use: No     Alcohol/week: 0.0 standard drinks    Drug use: No       Current Outpatient Medications on File Prior to Visit   Medication Sig Dispense Refill    acetaminophen (TYLENOL) 500 MG tablet Take 500 mg by mouth every 6  (six) hours as needed for Pain.      albuterol (PROVENTIL/VENTOLIN HFA) 90 mcg/actuation inhaler Inhale 2 puffs into the lungs every 6 (six) hours as needed for Wheezing.      beta-carotene,A,-vits C,E/mins (OCUVITE ORAL) Take 1 tablet by mouth once daily.      ergocalciferol, vitamin D2, (VITAMIN D ORAL) Take 25 mcg by mouth once daily.      fluticasone/umeclidin/vilanter (TRELEGY ELLIPTA INHL) Inhale into the lungs.      folic acid (FOLVITE) 800 MCG Tab Take 800 mcg by mouth once daily.      furosemide (LASIX) 40 MG tablet Take 40 mg by mouth as needed.      gabapentin (NEURONTIN) 100 MG capsule Take 100 mg by mouth 2 (two) times daily.      hydrochlorothiazide (HYDRODIURIL) 25 MG tablet Take 25 mg by mouth once daily.      levothyroxine (SYNTHROID) 125 MCG tablet Take 125 mcg by mouth before breakfast.      lisinopriL (PRINIVIL,ZESTRIL) 40 MG tablet Take 40 mg by mouth once daily.      metoprolol succinate (TOPROL-XL) 25 MG 24 hr tablet Take 25 mg by mouth once daily.      multivit with calcium,iron,min (WOMEN'S DAILY FORMULA ORAL) Take 1 tablet by mouth once daily.      potassium chloride (KLOR-CON) 10 MEQ TbSR Take 10 mEq by mouth as needed.      promethazine (PHENERGAN) 25 MG tablet Take 1 tablet (25 mg total) by mouth every 4 (four) hours. 20 tablet 0    tamsulosin (FLOMAX) 0.4 mg Cap Take 0.4 mg by mouth once daily.      tetracycline (ACHROMYCIN,SUMYCIN) 500 MG capsule Take 1 capsule (500 mg total) by mouth 2 (two) times daily. (Patient not taking: Reported on 2/14/2022) 20 capsule 0    tiotropium (SPIRIVA) 18 mcg inhalation capsule Inhale 18 mcg into the lungs once daily.      tramadol (ULTRAM) 50 mg tablet Take 50 mg by mouth every 6 (six) hours as needed for Pain.       No current facility-administered medications on file prior to visit.       Review of patient's allergies indicates:   Allergen Reactions    Macrobid [nitrofurantoin monohyd/m-cryst] Swelling    Sulfa (sulfonamide  antibiotics) Anaphylaxis    Asa [aspirin] Other (See Comments)    Codeine Nausea Only       Review of Systems   Constitutional: Positive for activity change and appetite change. Negative for chills and fever.   Respiratory: Negative for cough, chest tightness and shortness of breath.    Cardiovascular: Negative for chest pain.   Gastrointestinal: Positive for abdominal pain. Negative for abdominal distention, nausea and vomiting.   Genitourinary: Positive for difficulty urinating, dysuria, frequency, hematuria, pelvic pain and urgency. Negative for flank pain.   Neurological: Negative for dizziness.   Hematological: Does not bruise/bleed easily.   Psychiatric/Behavioral: Negative for agitation.       Objective:      Physical Exam  HENT:      Head: Normocephalic.   Cardiovascular:      Pulses: Normal pulses.   Pulmonary:      Effort: Pulmonary effort is normal.   Abdominal:      General: Abdomen is flat. There is no distension.      Palpations: Abdomen is soft.      Tenderness: There is no abdominal tenderness. There is no right CVA tenderness, left CVA tenderness or guarding.   Musculoskeletal:      Cervical back: Normal range of motion.   Skin:     General: Skin is warm.   Neurological:      General: No focal deficit present.      Mental Status: She is alert.           CT 2/2022  Impression:     Abnormal appearance of the urinary bladder suggesting chronic inflammation and or outlet obstruction     Bilateral hydroureter and mild right-sided hydronephrosis possibly secondary to lower urinary tract abnormalities.  Questionable filling defect versus mixing artifact within the distal left ureter.  Consider direct visualization as clinically felt appropriate.     All CT scans at this facility use dose modulation, iterative reconstruction, and/or weight based dosing when appropriate to reduce radiation dose to as low as reasonable achievable.    Assessment:     Problem Noted   Gross Hematuria 2/14/2022    With recent  UTI per patient no culture available, past smoker.      Recurrent Uti 2/14/2022    Per report no cultures recently in system     Kidney Stones 10/7/2015    Treatment in 2015 and 2016, was on K citra in past     Dysuria 2/14/2022   Nephrolithiasis (Resolved) 9/14/2016   Lesion of Bladder (Resolved) 9/14/2016       Plan:     1. Straight cath urine for urine culture and UA microscopy, accurate PVR  2. CT urogram and cystoscopy given gross hematuria (unclear if UTI at time)  3. PVR elevated discussed intermittent self cath vs indwelling chino vs further follow up as above (possible UDS in future).  Does not want indwelling or self cath, understands risks  4. Will start antibiotic empirically based on UA    Ashu Riley MD

## 2022-03-08 ENCOUNTER — PROCEDURE VISIT (OUTPATIENT)
Dept: UROLOGY | Facility: CLINIC | Age: 72
End: 2022-03-08
Payer: MEDICARE

## 2022-03-08 DIAGNOSIS — N20.0 KIDNEY STONES: ICD-10-CM

## 2022-03-08 DIAGNOSIS — N39.0 RECURRENT UTI: ICD-10-CM

## 2022-03-08 DIAGNOSIS — R31.0 GROSS HEMATURIA: ICD-10-CM

## 2022-03-08 PROCEDURE — 87077 CULTURE AEROBIC IDENTIFY: CPT | Mod: 59 | Performed by: UROLOGY

## 2022-03-08 PROCEDURE — 87086 URINE CULTURE/COLONY COUNT: CPT | Performed by: UROLOGY

## 2022-03-08 PROCEDURE — 87088 URINE BACTERIA CULTURE: CPT | Performed by: UROLOGY

## 2022-03-08 PROCEDURE — 99214 PR OFFICE/OUTPT VISIT, EST, LEVL IV, 30-39 MIN: ICD-10-PCS | Mod: S$GLB,,, | Performed by: UROLOGY

## 2022-03-08 PROCEDURE — 99214 OFFICE O/P EST MOD 30 MIN: CPT | Mod: S$GLB,,, | Performed by: UROLOGY

## 2022-03-08 PROCEDURE — 87186 SC STD MICRODIL/AGAR DIL: CPT | Mod: 59 | Performed by: UROLOGY

## 2022-03-08 NOTE — Clinical Note
Ms. Escobarford seen today and needs the following procedure:  --cystoscopy, bilateral retrograde pyelograms, possible left ureteroscopy/biopsy/stent, possible bladder biopsies, cystogram, other indicated procedures  Given COPD and medical issues, I am requesting PCP clearance for procedure, likely can be done under MAC vs LMA general anesthesia.  Please let me know if cleared and document in chart or let me know if needs further testing prior to procedure.   Thanks, Pradeep Riley

## 2022-03-08 NOTE — H&P (VIEW-ONLY)
Subjective:       Patient ID: Socorro Lucas is a 71 y.o. female.    Chief Complaint: No chief complaint on file.     This is a 71 y.o.  female patient that is new to me.  The patient was referred to me by PCP for multiple issues: recurrent UTI, dysuria, hematuria, h/o stones.  H/o recurrent UTIs, no cultures available.  States currently with pain to lower abdomen.  No flank pain.  Recently had hematuria with UTI per report, dark red urine with small clots, past smoker.  Reports UTI every 1-2 months and treated with antibiotic by PCP.  Symptoms: abd pain, frequency, urgency, gross hematuria.  H/o stones in  and 2016 requiring intervention,  Started K citrate at the time.      PVR today 324.  Myrbetriq started recently by PCP, also on tamsulosin.     3/8/22: follow up CT urogram, enterococcus UTI treated, states feels better, voiding improved.   today    Lab Results   Component Value Date    CREATININE 1.3 2022       ---  Past Medical History:   Diagnosis Date    COPD (chronic obstructive pulmonary disease)     DVT (deep venous thrombosis) left leg post cholecystectomy    Hypertension     Hypothyroidism     Kidney stone     Thyroid disease     Urinary tract infection     Vaginal infection        Past Surgical History:   Procedure Laterality Date    APPENDECTOMY      BREAST SURGERY      biopsy only x 6    CARPAL TUNNEL RELEASE Bilateral     CHOLECYSTECTOMY      CYSTOSCOPY      CHARI FILTER PLACEMENT         Family History   Problem Relation Age of Onset    No Known Problems Mother     No Known Problems Father     Kidney disease Neg Hx        Social History     Tobacco Use    Smoking status: Former Smoker     Quit date: 1996     Years since quittin.2    Smokeless tobacco: Never Used   Substance Use Topics    Alcohol use: No     Alcohol/week: 0.0 standard drinks    Drug use: No       Current Outpatient Medications on File Prior to Visit   Medication Sig Dispense  Refill    acetaminophen (TYLENOL) 500 MG tablet Take 500 mg by mouth every 6 (six) hours as needed for Pain.      albuterol (PROVENTIL/VENTOLIN HFA) 90 mcg/actuation inhaler Inhale 2 puffs into the lungs every 6 (six) hours as needed for Wheezing.      beta-carotene,A,-vits C,E/mins (OCUVITE ORAL) Take 1 tablet by mouth once daily.      ergocalciferol, vitamin D2, (VITAMIN D ORAL) Take 25 mcg by mouth once daily.      fluticasone/umeclidin/vilanter (TRELEGY ELLIPTA INHL) Inhale into the lungs.      folic acid (FOLVITE) 800 MCG Tab Take 800 mcg by mouth once daily.      furosemide (LASIX) 40 MG tablet Take 40 mg by mouth as needed.      gabapentin (NEURONTIN) 100 MG capsule Take 100 mg by mouth 2 (two) times daily.      hydrochlorothiazide (HYDRODIURIL) 25 MG tablet Take 25 mg by mouth once daily.      levothyroxine (SYNTHROID) 125 MCG tablet Take 125 mcg by mouth before breakfast.      lisinopriL (PRINIVIL,ZESTRIL) 40 MG tablet Take 40 mg by mouth once daily.      metoprolol succinate (TOPROL-XL) 25 MG 24 hr tablet Take 25 mg by mouth once daily.      multivit with calcium,iron,min (WOMEN'S DAILY FORMULA ORAL) Take 1 tablet by mouth once daily.      potassium chloride (KLOR-CON) 10 MEQ TbSR Take 10 mEq by mouth as needed.      promethazine (PHENERGAN) 25 MG tablet Take 1 tablet (25 mg total) by mouth every 4 (four) hours. 20 tablet 0    tamsulosin (FLOMAX) 0.4 mg Cap Take 0.4 mg by mouth once daily.      tetracycline (ACHROMYCIN,SUMYCIN) 500 MG capsule Take 1 capsule (500 mg total) by mouth 2 (two) times daily. (Patient not taking: Reported on 2/14/2022) 20 capsule 0    tiotropium (SPIRIVA) 18 mcg inhalation capsule Inhale 18 mcg into the lungs once daily.      tramadol (ULTRAM) 50 mg tablet Take 50 mg by mouth every 6 (six) hours as needed for Pain.       No current facility-administered medications on file prior to visit.       Review of patient's allergies indicates:   Allergen Reactions     Macrobid [nitrofurantoin monohyd/m-cryst] Swelling    Sulfa (sulfonamide antibiotics) Anaphylaxis    Asa [aspirin] Other (See Comments)    Codeine Nausea Only       Review of Systems   Constitutional: Positive for activity change and appetite change. Negative for chills and fever.   Respiratory: Negative for cough, chest tightness and shortness of breath.    Cardiovascular: Negative for chest pain.   Gastrointestinal: Positive for abdominal pain. Negative for abdominal distention, nausea and vomiting.   Genitourinary: Positive for difficulty urinating, dysuria, frequency, hematuria, pelvic pain and urgency. Negative for flank pain.   Neurological: Negative for dizziness.   Hematological: Does not bruise/bleed easily.   Psychiatric/Behavioral: Negative for agitation.       Objective:      Physical Exam  HENT:      Head: Normocephalic.   Cardiovascular:      Pulses: Normal pulses.   Pulmonary:      Effort: Pulmonary effort is normal.   Abdominal:      General: Abdomen is flat. There is no distension.      Palpations: Abdomen is soft.      Tenderness: There is no abdominal tenderness. There is no right CVA tenderness, left CVA tenderness or guarding.   Musculoskeletal:      Cervical back: Normal range of motion.   Skin:     General: Skin is warm.   Neurological:      General: No focal deficit present.      Mental Status: She is alert.           Results for orders placed or performed during the hospital encounter of 02/16/22 (from the past 2160 hour(s))   CT Urogram Abd Pelvis W WO    Impression    Abnormal appearance of the urinary bladder suggesting chronic inflammation and or outlet obstruction    Bilateral hydroureter and mild right-sided hydronephrosis possibly secondary to lower urinary tract abnormalities.  Questionable filling defect versus mixing artifact within the distal left ureter.  Consider direct visualization as clinically felt appropriate.    All CT scans at this facility use dose modulation,  iterative reconstruction, and/or weight based dosing when appropriate to reduce radiation dose to as low as reasonable achievable.      Electronically signed by: Lavon Hoskins Jr  Date:    02/16/2022  Time:    12:39       Assessment:     Problem Noted   Gross Hematuria 2/14/2022    With recent UTI per patient no culture available, past smoker.   --CTU 2/22: bilateral hydroureter, abrupt contrast cut off on left, distended bladder/trabeculation     Recurrent Uti 2/14/2022    Enterococcus 2/2022 treated     Kidney Stones 10/7/2015    Treatment in 2015 and 2016, was on K citra in past  2mm left stone on CTU 2/2022     Dysuria 2/14/2022   Nephrolithiasis (Resolved) 9/14/2016   Lesion of Bladder (Resolved) 9/14/2016       Plan:     1.  CT urogram discussed, given bladder and left ureteral, bilateral hydronephrosis findings recommend the following: cystoscopy, bilateral retrograde pyelograms, possible left ureteroscopy/biopsy/stent, possible bladder biopsies, cystogram, other indicated procedures... Pt wishes to proceed, no cystoscopy today  2.  PCP clearance for anesthesia given COPD, will follow up clearance and schedule procedure after  3.  Urine culture today to ensure sterile.   4.   today again discussed intermittent self cath vs indwelling chino vs SPT (possible UDS in future).  Does not want indwelling or self cath, understands risks       Ashu Riley MD

## 2022-03-10 ENCOUNTER — HOSPITAL ENCOUNTER (OUTPATIENT)
Dept: RADIOLOGY | Facility: HOSPITAL | Age: 72
Discharge: HOME | End: 2022-03-10
Attending: FAMILY MEDICINE
Payer: MEDICARE

## 2022-03-10 DIAGNOSIS — R05.1 ACUTE COUGH: ICD-10-CM

## 2022-03-10 PROCEDURE — 71046 XR CHEST PA AND LATERAL: ICD-10-PCS | Mod: 26,,, | Performed by: RADIOLOGY

## 2022-03-10 PROCEDURE — 71046 X-RAY EXAM CHEST 2 VIEWS: CPT | Mod: TC,FY

## 2022-03-10 PROCEDURE — 71046 X-RAY EXAM CHEST 2 VIEWS: CPT | Mod: 26,,, | Performed by: RADIOLOGY

## 2022-03-11 ENCOUNTER — TELEPHONE (OUTPATIENT)
Dept: UROLOGY | Facility: CLINIC | Age: 72
End: 2022-03-11
Payer: MEDICARE

## 2022-03-11 DIAGNOSIS — N39.0 RECURRENT UTI: Primary | ICD-10-CM

## 2022-03-11 RX ORDER — CIPROFLOXACIN 500 MG/1
500 TABLET ORAL DAILY
Qty: 7 TABLET | Refills: 0 | Status: SHIPPED | OUTPATIENT
Start: 2022-03-11 | End: 2022-03-18

## 2022-03-11 NOTE — TELEPHONE ENCOUNTER
Spoke with patient, informed of findings.  She will  from pharmacy.  Patient had pre-operative testing done and has visited with her primary MD at [facility].  Correspondence to be sent tentatively on Monday.  I voiced understanding.

## 2022-03-11 NOTE — TELEPHONE ENCOUNTER
----- Message from Ashu Riley MD sent at 3/11/2022  8:31 AM CST -----  Cipro sent to pharmacy please call patient and advise.

## 2022-03-14 LAB
BACTERIA UR CULT: ABNORMAL
BACTERIA UR CULT: ABNORMAL

## 2022-03-18 ENCOUNTER — TELEPHONE (OUTPATIENT)
Dept: UROLOGY | Facility: CLINIC | Age: 72
End: 2022-03-18
Payer: MEDICARE

## 2022-03-18 NOTE — TELEPHONE ENCOUNTER
Spoke with patient.  Questioning procedure date.  Informed clearance has not been received from primary MD at [facility].  Confirmed phone number, will contact Dr Knox's office on Monday for clearance.  Patient voiced understanding. [phone].  Patient had testing done on 3/10/22.

## 2022-03-18 NOTE — TELEPHONE ENCOUNTER
----- Message from Akil Kim sent at 3/18/2022  2:02 PM CDT -----  Contact: patient  [phone]  Patient calling to speak with you about scheduling a upcoming surgery   Please advise

## 2022-03-21 ENCOUNTER — TELEPHONE (OUTPATIENT)
Dept: UROLOGY | Facility: CLINIC | Age: 72
End: 2022-03-21
Payer: MEDICARE

## 2022-03-21 RX ORDER — LIDOCAINE HYDROCHLORIDE 10 MG/ML
1 INJECTION, SOLUTION EPIDURAL; INFILTRATION; INTRACAUDAL; PERINEURAL ONCE
Status: DISCONTINUED | OUTPATIENT
Start: 2022-03-21 | End: 2022-04-12

## 2022-03-21 RX ORDER — ACETAMINOPHEN 500 MG
1000 TABLET ORAL
Status: DISCONTINUED | OUTPATIENT
Start: 2022-03-21 | End: 2023-04-07

## 2022-03-21 NOTE — TELEPHONE ENCOUNTER
----- Message from Yue Foy sent at 3/21/2022  8:13 AM CDT -----  Contact: [phone]/ Self  Type: Requesting to speak with nurse    Who Called: Pt   Regarding: noticed blood in urine since last night    Would the patient rather a call back or a response via [employer]? Call back  Best Call Back Number: [phone]  Additional Information: n/a

## 2022-03-21 NOTE — TELEPHONE ENCOUNTER
Spoke with patient, she is experiencing gross hematuria that re-occurred about 1800 hours last evening.  Not as much this morning.  Clearance still pending.  Contacted Dr Knox's office, goes to voicemail.  Any new recommendations

## 2022-03-21 NOTE — TELEPHONE ENCOUNTER
Spoke with patient, nurse visit scheduled for cath urine.  Advised to push fluids.  Informed early morning the urine is always darker, but as she drink fluids urine will get lighter.  Informed of surgery date.  She voiced understanding.  Advised to contact office for appointment sooner if needed.

## 2022-03-28 ENCOUNTER — TELEPHONE (OUTPATIENT)
Dept: UROLOGY | Facility: CLINIC | Age: 72
End: 2022-03-28
Payer: MEDICARE

## 2022-03-28 ENCOUNTER — CLINICAL SUPPORT (OUTPATIENT)
Dept: UROLOGY | Facility: CLINIC | Age: 72
End: 2022-03-28
Payer: MEDICARE

## 2022-03-28 VITALS
HEIGHT: 61 IN | HEART RATE: 69 BPM | BODY MASS INDEX: 38.78 KG/M2 | DIASTOLIC BLOOD PRESSURE: 72 MMHG | WEIGHT: 205.38 LBS | SYSTOLIC BLOOD PRESSURE: 154 MMHG

## 2022-03-28 DIAGNOSIS — N39.0 RECURRENT UTI: Primary | ICD-10-CM

## 2022-03-28 PROCEDURE — 87077 CULTURE AEROBIC IDENTIFY: CPT | Performed by: UROLOGY

## 2022-03-28 PROCEDURE — 99999 PR PBB SHADOW E&M-EST. PATIENT-LVL IV: CPT | Mod: PBBFAC,,,

## 2022-03-28 PROCEDURE — 87088 URINE BACTERIA CULTURE: CPT | Performed by: UROLOGY

## 2022-03-28 PROCEDURE — 99999 PR PBB SHADOW E&M-EST. PATIENT-LVL IV: ICD-10-PCS | Mod: PBBFAC,,,

## 2022-03-28 PROCEDURE — 87186 SC STD MICRODIL/AGAR DIL: CPT | Performed by: UROLOGY

## 2022-03-28 RX ORDER — MIRABEGRON 50 MG/1
TABLET, FILM COATED, EXTENDED RELEASE ORAL
COMMUNITY
Start: 2022-02-07 | End: 2022-04-12

## 2022-03-28 RX ORDER — DIPHENOXYLATE HYDROCHLORIDE AND ATROPINE SULFATE 2.5; .025 MG/1; MG/1
TABLET ORAL
COMMUNITY
Start: 2022-02-07 | End: 2023-08-15

## 2022-03-28 RX ORDER — TOLTERODINE TARTRATE 2 MG/1
TABLET, EXTENDED RELEASE ORAL
COMMUNITY
Start: 2022-01-07 | End: 2023-04-05

## 2022-03-28 NOTE — TELEPHONE ENCOUNTER
----- Message from Ashu Riley MD sent at 3/21/2022 11:41 AM CDT -----  Follow up next week for cath urine culture, surgery likely 4/4/22

## 2022-03-28 NOTE — PROGRESS NOTES
Under sterile technique, in/out female catheter inserted into urinary bladder.  Urine sample collected (dark yellow/cloudy)  Catheter expelled from bladder.  Verified with two patient identifier and labeled specimen in front of patient.  She tolerated well.

## 2022-03-31 LAB — BACTERIA UR CULT: ABNORMAL

## 2022-04-04 ENCOUNTER — ANESTHESIA EVENT (OUTPATIENT)
Dept: SURGERY | Facility: HOSPITAL | Age: 72
End: 2022-04-04
Payer: MEDICARE

## 2022-04-04 ENCOUNTER — HOSPITAL ENCOUNTER (OUTPATIENT)
Facility: HOSPITAL | Age: 72
Discharge: HOME | End: 2022-04-04
Attending: UROLOGY | Admitting: UROLOGY
Payer: MEDICARE

## 2022-04-04 ENCOUNTER — ANESTHESIA (OUTPATIENT)
Dept: SURGERY | Facility: HOSPITAL | Age: 72
End: 2022-04-04
Payer: MEDICARE

## 2022-04-04 VITALS
HEIGHT: 61 IN | BODY MASS INDEX: 38.71 KG/M2 | OXYGEN SATURATION: 94 % | SYSTOLIC BLOOD PRESSURE: 114 MMHG | HEART RATE: 58 BPM | RESPIRATION RATE: 16 BRPM | TEMPERATURE: 98 F | DIASTOLIC BLOOD PRESSURE: 62 MMHG | WEIGHT: 205 LBS

## 2022-04-04 PROCEDURE — 71000016 HC POSTOP RECOV ADDL HR: Performed by: UROLOGY

## 2022-04-04 PROCEDURE — 36000707: Performed by: UROLOGY

## 2022-04-04 PROCEDURE — 63600175 PHARM REV CODE 636 W HCPCS: Performed by: ANESTHESIOLOGY

## 2022-04-04 PROCEDURE — 71000033 HC RECOVERY, INTIAL HOUR: Performed by: UROLOGY

## 2022-04-04 PROCEDURE — 25000003 PHARM REV CODE 250: Performed by: NURSE ANESTHETIST, CERTIFIED REGISTERED

## 2022-04-04 PROCEDURE — 71000015 HC POSTOP RECOV 1ST HR: Performed by: UROLOGY

## 2022-04-04 PROCEDURE — 51600 PR INJECTION FOR BLADDER X-RAY: ICD-10-PCS | Mod: 51,,, | Performed by: UROLOGY

## 2022-04-04 PROCEDURE — 52351 CYSTOURETERO & OR PYELOSCOPE: CPT | Mod: ,,, | Performed by: UROLOGY

## 2022-04-04 PROCEDURE — 93010 ELECTROCARDIOGRAM REPORT: CPT | Mod: S$GLB,,, | Performed by: INTERNAL MEDICINE

## 2022-04-04 PROCEDURE — 36000706: Performed by: UROLOGY

## 2022-04-04 PROCEDURE — 51600 INJECTION FOR BLADDER X-RAY: CPT | Mod: 51,,, | Performed by: UROLOGY

## 2022-04-04 PROCEDURE — 37000008 HC ANESTHESIA 1ST 15 MINUTES: Performed by: UROLOGY

## 2022-04-04 PROCEDURE — 25000003 PHARM REV CODE 250: Performed by: ANESTHESIOLOGY

## 2022-04-04 PROCEDURE — C1758 CATHETER, URETERAL: HCPCS | Performed by: UROLOGY

## 2022-04-04 PROCEDURE — 74420 PR  X-RAY RETROGRADE PYELOGRAM: ICD-10-PCS | Mod: 26,,, | Performed by: UROLOGY

## 2022-04-04 PROCEDURE — 99499 NO LOS: ICD-10-PCS | Mod: ,,, | Performed by: UROLOGY

## 2022-04-04 PROCEDURE — 93005 ELECTROCARDIOGRAM TRACING: CPT | Mod: 59

## 2022-04-04 PROCEDURE — 99499 UNLISTED E&M SERVICE: CPT | Mod: ,,, | Performed by: UROLOGY

## 2022-04-04 PROCEDURE — 37000009 HC ANESTHESIA EA ADD 15 MINS: Performed by: UROLOGY

## 2022-04-04 PROCEDURE — 74420 UROGRAPHY RTRGR +-KUB: CPT | Mod: 26,,, | Performed by: UROLOGY

## 2022-04-04 PROCEDURE — 25000003 PHARM REV CODE 250: Performed by: UROLOGY

## 2022-04-04 PROCEDURE — C1769 GUIDE WIRE: HCPCS | Performed by: UROLOGY

## 2022-04-04 PROCEDURE — 63600175 PHARM REV CODE 636 W HCPCS: Performed by: NURSE ANESTHETIST, CERTIFIED REGISTERED

## 2022-04-04 PROCEDURE — 52351 PR CYSTO/URETERO/PYELOSCOPY, DX: ICD-10-PCS | Mod: ,,, | Performed by: UROLOGY

## 2022-04-04 PROCEDURE — 25500020 PHARM REV CODE 255: Performed by: UROLOGY

## 2022-04-04 PROCEDURE — 71000039 HC RECOVERY, EACH ADD'L HOUR: Performed by: UROLOGY

## 2022-04-04 PROCEDURE — 93010 EKG 12-LEAD: ICD-10-PCS | Mod: S$GLB,,, | Performed by: INTERNAL MEDICINE

## 2022-04-04 PROCEDURE — 63600175 PHARM REV CODE 636 W HCPCS: Performed by: UROLOGY

## 2022-04-04 RX ORDER — PROPOFOL 10 MG/ML
VIAL (ML) INTRAVENOUS
Status: DISCONTINUED | OUTPATIENT
Start: 2022-04-04 | End: 2022-04-04

## 2022-04-04 RX ORDER — DIPHENHYDRAMINE HYDROCHLORIDE 50 MG/ML
25 INJECTION INTRAMUSCULAR; INTRAVENOUS EVERY 6 HOURS PRN
Status: DISCONTINUED | OUTPATIENT
Start: 2022-04-04 | End: 2022-04-04 | Stop reason: HOSPADM

## 2022-04-04 RX ORDER — SUCCINYLCHOLINE CHLORIDE 20 MG/ML
INJECTION INTRAMUSCULAR; INTRAVENOUS
Status: DISCONTINUED | OUTPATIENT
Start: 2022-04-04 | End: 2022-04-04

## 2022-04-04 RX ORDER — HYDROMORPHONE HYDROCHLORIDE 2 MG/ML
0.5 INJECTION, SOLUTION INTRAMUSCULAR; INTRAVENOUS; SUBCUTANEOUS EVERY 5 MIN PRN
Status: DISCONTINUED | OUTPATIENT
Start: 2022-04-04 | End: 2022-04-04 | Stop reason: HOSPADM

## 2022-04-04 RX ORDER — LIDOCAINE HYDROCHLORIDE 20 MG/ML
INJECTION INTRAVENOUS
Status: DISCONTINUED | OUTPATIENT
Start: 2022-04-04 | End: 2022-04-04

## 2022-04-04 RX ORDER — EPHEDRINE SULFATE 50 MG/ML
INJECTION, SOLUTION INTRAVENOUS
Status: DISCONTINUED | OUTPATIENT
Start: 2022-04-04 | End: 2022-04-04

## 2022-04-04 RX ORDER — DEXAMETHASONE SODIUM PHOSPHATE 4 MG/ML
INJECTION, SOLUTION INTRA-ARTICULAR; INTRALESIONAL; INTRAMUSCULAR; INTRAVENOUS; SOFT TISSUE
Status: DISCONTINUED | OUTPATIENT
Start: 2022-04-04 | End: 2022-04-04

## 2022-04-04 RX ORDER — AMPICILLIN 500 MG/1
500 CAPSULE ORAL 4 TIMES DAILY
Qty: 28 CAPSULE | Refills: 0 | Status: SHIPPED | OUTPATIENT
Start: 2022-04-04 | End: 2022-04-11

## 2022-04-04 RX ORDER — ONDANSETRON 2 MG/ML
INJECTION INTRAMUSCULAR; INTRAVENOUS
Status: DISCONTINUED | OUTPATIENT
Start: 2022-04-04 | End: 2022-04-04

## 2022-04-04 RX ORDER — FENTANYL CITRATE 50 UG/ML
INJECTION, SOLUTION INTRAMUSCULAR; INTRAVENOUS
Status: DISCONTINUED | OUTPATIENT
Start: 2022-04-04 | End: 2022-04-04

## 2022-04-04 RX ORDER — HYDROMORPHONE HYDROCHLORIDE 2 MG/ML
0.2 INJECTION, SOLUTION INTRAMUSCULAR; INTRAVENOUS; SUBCUTANEOUS EVERY 5 MIN PRN
Status: DISCONTINUED | OUTPATIENT
Start: 2022-04-04 | End: 2022-04-04 | Stop reason: HOSPADM

## 2022-04-04 RX ORDER — PHENYLEPHRINE HYDROCHLORIDE 10 MG/ML
INJECTION INTRAVENOUS
Status: DISCONTINUED | OUTPATIENT
Start: 2022-04-04 | End: 2022-04-04

## 2022-04-04 RX ORDER — OXYCODONE AND ACETAMINOPHEN 5; 325 MG/1; MG/1
1 TABLET ORAL
Status: DISCONTINUED | OUTPATIENT
Start: 2022-04-04 | End: 2022-04-04 | Stop reason: HOSPADM

## 2022-04-04 RX ORDER — ROCURONIUM BROMIDE 10 MG/ML
INJECTION, SOLUTION INTRAVENOUS
Status: DISCONTINUED | OUTPATIENT
Start: 2022-04-04 | End: 2022-04-04

## 2022-04-04 RX ADMIN — ROCURONIUM BROMIDE 5 MG: 10 INJECTION, SOLUTION INTRAVENOUS at 07:04

## 2022-04-04 RX ADMIN — DEXAMETHASONE SODIUM PHOSPHATE 4 MG: 4 INJECTION, SOLUTION INTRA-ARTICULAR; INTRALESIONAL; INTRAMUSCULAR; INTRAVENOUS; SOFT TISSUE at 07:04

## 2022-04-04 RX ADMIN — HYPROMELLOSE 2910 2 DROP: 5 SOLUTION OPHTHALMIC at 07:04

## 2022-04-04 RX ADMIN — AMPICILLIN SODIUM 1 G: 1 INJECTION, POWDER, FOR SOLUTION INTRAMUSCULAR; INTRAVENOUS at 07:04

## 2022-04-04 RX ADMIN — SODIUM CHLORIDE, SODIUM LACTATE, POTASSIUM CHLORIDE, AND CALCIUM CHLORIDE: .6; .31; .03; .02 INJECTION, SOLUTION INTRAVENOUS at 06:04

## 2022-04-04 RX ADMIN — PROPOFOL 150 MG: 10 INJECTION, EMULSION INTRAVENOUS at 07:04

## 2022-04-04 RX ADMIN — LIDOCAINE HYDROCHLORIDE 100 MG: 20 INJECTION, SOLUTION INTRAVENOUS at 07:04

## 2022-04-04 RX ADMIN — OXYCODONE HYDROCHLORIDE AND ACETAMINOPHEN 1 TABLET: 5; 325 TABLET ORAL at 08:04

## 2022-04-04 RX ADMIN — GLYCOPYRROLATE 0.11 MG: 0.2 INJECTION, SOLUTION INTRAMUSCULAR; INTRAVITREAL at 07:04

## 2022-04-04 RX ADMIN — PHENYLEPHRINE HYDROCHLORIDE 100 MCG: 10 INJECTION INTRAVENOUS at 07:04

## 2022-04-04 RX ADMIN — GLYCOPYRROLATE 0.1 MG: 0.2 INJECTION, SOLUTION INTRAMUSCULAR; INTRAVITREAL at 06:04

## 2022-04-04 RX ADMIN — ONDANSETRON 4 MG: 2 INJECTION, SOLUTION INTRAMUSCULAR; INTRAVENOUS at 07:04

## 2022-04-04 RX ADMIN — GENTAMICIN SULFATE 120 MG: 40 INJECTION, SOLUTION INTRAMUSCULAR; INTRAVENOUS at 07:04

## 2022-04-04 RX ADMIN — SUCCINYLCHOLINE CHLORIDE 120 MG: 20 INJECTION, SOLUTION INTRAMUSCULAR; INTRAVENOUS at 07:04

## 2022-04-04 RX ADMIN — HYDROMORPHONE HYDROCHLORIDE 0.5 MG: 2 INJECTION INTRAMUSCULAR; INTRAVENOUS; SUBCUTANEOUS at 08:04

## 2022-04-04 RX ADMIN — EPHEDRINE SULFATE 5 MG: 50 INJECTION, SOLUTION INTRAMUSCULAR; INTRAVENOUS; SUBCUTANEOUS at 07:04

## 2022-04-04 RX ADMIN — EPHEDRINE SULFATE 10 MG: 50 INJECTION, SOLUTION INTRAMUSCULAR; INTRAVENOUS; SUBCUTANEOUS at 07:04

## 2022-04-04 RX ADMIN — FENTANYL CITRATE 100 MCG: 50 INJECTION, SOLUTION INTRAMUSCULAR; INTRAVENOUS at 07:04

## 2022-04-04 RX ADMIN — SUCCINYLCHOLINE CHLORIDE 100 MG: 20 INJECTION, SOLUTION INTRAMUSCULAR; INTRAVENOUS at 07:04

## 2022-04-04 NOTE — ANESTHESIA PROCEDURE NOTES
Intubation    Date/Time: 4/4/2022 7:04 AM  Performed by: Hari Vogel CRNA  Authorized by: Chito Fulton MD     Intubation:     Induction:  Intravenous    Intubated:  Postinduction    Mask Ventilation:  Easy mask    Attempts:  1    Attempted By:  CRNA    Method of Intubation:  Video laryngoscopy    Blade:  Blankenship 3    Laryngeal View Grade: Grade I - full view of cords      Difficult Airway Encountered?: No      Complications:  None    Airway Device:  Oral endotracheal tube    Airway Device Size:  7.0    Style/Cuff Inflation:  Cuffed (inflated to minimal occlusive pressure)    Inflation Amount (mL):  4    Tube secured:  22    Placement Verified By:  Capnometry    Complicating Factors:  None    Findings Post-Intubation:  BS equal bilateral and atraumatic/condition of teeth unchanged

## 2022-04-04 NOTE — PATIENT INSTRUCTIONS
You are being discharged with a chino catheter. Use the leg bag during the day and the larger bag at night. Empty the bag when it is full. If you have any problems with the chino catheter or if it stops draining please call the office.    Some blood in the urine is normal and expected.  Drink plenty of fluids.       Call the clinic at [phone] or return to the emergency department if you  have persistent fever >101.4, severe bleeding that does not stop, pain not controlled with medications, severe nausea and vomiting limiting intake of liquids and solids, or any other concerns.

## 2022-04-04 NOTE — TRANSFER OF CARE
"Anesthesia Transfer of Care Note    Patient: Socorro Lucas    Procedure(s) Performed: Procedure(s) (LRB):  PYELOGRAM, RETROGRADE (Bilateral)  URETEROSCOPY (Left)  CYSTOSCOPY, WITH CYSTOGRAM (N/A)  INSERTION, CATHETER, URETER (N/A)    Patient location: PACU    Anesthesia Type: general    Transport from OR: Transported from OR on 2-3 L/min O2 by NC with adequate spontaneous ventilation    Post pain: adequate analgesia    Post assessment: no apparent anesthetic complications and tolerated procedure well    Post vital signs: stable    Level of consciousness: awake and alert    Nausea/Vomiting: no nausea/vomiting    Complications: none    Transfer of care protocol was followed      Last vitals:   Visit Vitals  BP (!) 128/59 (BP Location: Right arm, Patient Position: Lying)   Pulse (!) 59   Temp 36.6 °C (97.8 °F) (Skin)   Resp 16   Ht 5' 1" (1.549 m)   Wt 93 kg (205 lb)   SpO2 98%   Breastfeeding No   BMI 38.73 kg/m²     "

## 2022-04-04 NOTE — ANESTHESIA PREPROCEDURE EVALUATION
04/04/2022     Socorro Lucas is a 71 y.o., female here for retrograde pyelogram    Past Medical History:   Diagnosis Date    COPD (chronic obstructive pulmonary disease)     DVT (deep venous thrombosis) left leg post cholecystectomy    Hypertension     Hypothyroidism     Kidney stone     Thyroid disease     Urinary tract infection     Vaginal infection      Past Surgical History:   Procedure Laterality Date    APPENDECTOMY      BREAST SURGERY      biopsy only x 6    CARPAL TUNNEL RELEASE Bilateral     CHOLECYSTECTOMY      CYSTOSCOPY      CHARI FILTER PLACEMENT           Pre-op Assessment    I have reviewed the Patient Summary Reports.    I have reviewed the Nursing Notes.    I have reviewed the Medications.     Review of Systems  Anesthesia Hx:  No problems with previous Anesthesia  History of prior surgery of interest to airway management or planning: Previous anesthesia: General 10/2015 with general anesthesia.   Denies Personal Hx of Anesthesia complications.   Social:  No Alcohol Use  Tobacco Use:  of cigarette, quit smoking >10 years ago, Smoking Cessation discussion.   Hematology/Oncology:        Hematology Comments: Left DVT 1996 s/p cholecystectomy with chari filter placement 1996   EENT/Dental:EENT/Dental Normal   Cardiovascular:   Exercise tolerance: poor Hypertension, well controlled Denies MI.   Denies Dysrhythmias.   Denies Angina. PENA Decreased functional capacity due to COPD  Followed closely by PCP      Pulmonary:   COPD, moderate Denies Sleep Apnea.    Renal/:   renal calculi    Hepatic/GI:   GERD, well controlled    Musculoskeletal:   Arthritis     OB/GYN/PEDS:      Neurological:  Neurology Normal    Endocrine:   Hypothyroidism  Parathyroid Disease, Hyperparathyroidism, Primary Hyperparathyroidism        Physical Exam  General:  Morbid Obesity       Airway/Jaw/Neck:  Airway Findings: Mouth Opening: Normal   Tongue: Normal   General Airway Assessment: Adult Mallampati: II  TM Distance: Normal, at least 6 cm         Eyes/Ears/Nose:  EYES/EARS/NOSE FINDINGS: Normal   Dental:  Dental Findings: Upper Dentures, Lower Dentures     Chest/Lungs:  Chest/Lungs Findings: Clear to auscultation, Normal Respiratory Rate      Heart/Vascular:  Heart Findings: Rate: Normal  Rhythm: Occasional Prematures  Heart murmur: negative    Abdomen:  Abdomen Findings: Normal      Mental Status:  Mental Status Findings: Normal        Anesthesia Plan  Type of Anesthesia, risks & benefits discussed:  Anesthesia Type:  general, MAC    Patient's Preference:   Plan Factors:          Intra-op Monitoring Plan: standard ASA monitors  Intra-op Monitoring Plan Comments:   Post Op Pain Control Plan: multimodal analgesia  Post Op Pain Control Plan Comments:     Induction:   IV  Beta Blocker:  Patient is not currently on a Beta-Blocker (No further documentation required).       Informed Consent: Informed consent signed with the Patient and all parties understand the risks and agree with anesthesia plan.  All questions answered.    ASA Score: 3     Day of Surgery Review of History & Physical:              Ready For Surgery From Anesthesia Perspective.           Physical Exam  General: Morbid Obesity    Airway:  Mallampati: II   Mouth Opening: Normal  TM Distance: Normal, at least 6 cm  Tongue: Normal    Dental:  Upper Dentures, Lower Dentures    Chest/Lungs:  Clear to auscultation, Normal Respiratory Rate    Heart:  Rate: Normal  Rhythm: Occasional Prematures          Anesthesia Plan  Type of Anesthesia, risks & benefits discussed:    Anesthesia Type: general, MAC  Intra-op Monitoring Plan: standard ASA monitors  Post Op Pain Control Plan: multimodal analgesia  Induction:  IV  Informed Consent: Informed consent signed with the Patient and all parties understand the risks and agree with anesthesia plan.   All questions answered.   ASA Score: 3    Ready For Surgery From Anesthesia Perspective.       .

## 2022-04-04 NOTE — PLAN OF CARE
"Meets criteria for discharge from PACU. KATHRYN callaway. Pain "better"  , Report to Dr. Fulton    "

## 2022-04-04 NOTE — DISCHARGE SUMMARY
[facility] - [facility] (Hospital)  Discharge Note  Short Stay    Procedure(s) (LRB):  PYELOGRAM, RETROGRADE (Bilateral)  URETEROSCOPY (Left)  CYSTOSCOPY, WITH CYSTOGRAM (N/A)  INSERTION, CATHETER, URETER (N/A)    OUTCOME: Patient tolerated treatment/procedure well without complication and is now ready for discharge.    DISPOSITION: Home or Self Care    FINAL DIAGNOSIS:  Chronic retention, bilateral reflux/mild hydronephrosis, recurrent UTI    FOLLOWUP: In clinic    DISCHARGE INSTRUCTIONS:  In chart    TIME SPENT ON DISCHARGE: 10 minutes

## 2022-04-04 NOTE — DISCHARGE INSTRUCTIONS
ANESTHESIA  -For the first 24 hours after surgery:  Do not drive, use heavy equipment, make important decisions, or drink alcohol  -It is normal to feel sleepy for several hours.  Rest until you are more awake.  -Have someone stay with you, if needed.  They can watch for problems and help keep you safe.  -Some possible post anesthesia side effects include: nausea and vomiting, sore throat and hoarseness, sleepiness, and dizziness.    PAIN  -If you have pain after surgery, pain medicine will help you feel better.  Take it as directed, before pain becomes severe.  Most pain relievers taken by mouth need at least 20-30 minutes to start working.  -Do not drive or drink alcohol while taking pain medicine.  -Pain medication can upset your stomach.  Taking them with a little food may help.  -Other ways to help control pain: elevation, ice, and relaxation  -Call your surgeon if still having unmanageable pain an hour after taking pain medicine.  -Pain medicine can cause constipation.  Taking an over-the counter stool softener while on prescription pain medicine and drinking plenty of fluids can prevent this side effect.  -Call your surgeon if you have severe side effects like: breathing problems, trouble waking up, dizziness, confusion, or severe constipation.    NAUSEA  -Some people have nausea after surgery.  This is often because of anesthesia, pain, pain medicine, or the stress of surgery.  -Do not push yourself to eat.  Start off with clear liquids and soup.  Slowly move to solid foods.  Don't eat fatty, rich, spicy foods at first.  Eat smaller amounts.  -If you develop persistent nausea and vomiting please notify your surgeon immediately.    BLEEDING  -Different types of surgery require different types of care and dressing changes.  It is important to follow all instructions and advice from your surgeon.  Change dressing as directed.  Call your surgeon for any concerns regarding postop bleeding.    SIGNS OF  INFECTION  -Signs of infection include: fever, swelling, drainage, and redness  -Notify your surgeon if you have a fever of 100.4 F (38.0 C) or higher.  -Notify your surgeon if you notice redness, swelling, increased pain, pus, or a foul smell at the incision site.    Notify Dr. Riley for any problems or concerns.

## 2022-04-04 NOTE — INTERVAL H&P NOTE
The patient has been examined and the H&P has been reviewed:    I concur with the findings and no changes have occurred since H&P was written.    Anesthesia/Surgery risks, benefits and alternative options discussed and understood by patient/family.    Problem Noted   Gross Hematuria 2/14/2022    With recent UTI per patient no culture available, past smoker.   --CTU 2/22: bilateral hydroureter, abrupt contrast cut off on left, distended bladder/trabeculation       Recurrent Uti 2/14/2022    Enterococcus 2/2022 treated       Kidney Stones 10/7/2015    Treatment in 2015 and 2016, was on K citra in past  2mm left stone on CTU 2/2022       Dysuria 2/14/2022   Nephrolithiasis (Resolved) 9/14/2016   Lesion of Bladder (Resolved) 9/14/2016

## 2022-04-04 NOTE — OP NOTE
OPERATIVE NOTE    Procedure date: 04/04/2022    Preoperative Diagnosis:  Recurrent UTI, gross hematuria, bilateral hydronephrosis, abnormal CT scan    Postoperative Diagnosis: same    Procedure:  Cystoscopy,  Bilateral Retrograde Pyelograms,  Left diagnostic ureteroscopy, cystogram    Surgeon:  Ashu Riley MD    Anesthesia:  LMA  EBL:  minimal  Tubes and Drains:  16F chino   Specimen(s):  none    Complications:  none    Indication:  72 yo F with recurrent UTI, gross hematuria, bilateral hydronephrosis.  CT showed ? Left ureteral abnormality.  Chronic retention, patient refused CIC or chino.     Findings:  Chronic cystitis of bladder, bilateral reflux (right>left) on cystogram, trabeculated bladder, left ureteroscopy with tortuous ureter but no lesions, both sides drain well, chino placed for chronic retention    Description of Procedure:  The patient was identified and surgical site verification was performed prior to obtaining consent.  The patient was brought to the cystoscopy suite.  SCDs were placed on the bilateral lower extremities and were cycling during the entire procedure.  Adequate LMA anesthesia was obtained, and the patient was positioned in dorsal lithotomy and prepped and draped in the normal standard fashion.  A preoperative Time Out was performed addressing the anticipated surgical site, procedure, and safety precautions; everyone in the room agreed.  The patient received preoperative antibiotics.      The 22 Fr cystoscope was inserted into the patient's urethral meatus and advanced to the bladder.  The  urethra was normal.  The bladder had chronic cystitis, trabeculation, evidence of chronic retention.      The bladder was inspected with the 30 degree lens in its entirety and not lesions, tumors or stones were noted.  The ureteral orifices were in their normal anatomic positions and patulous.     A cystogram was done through a 16F chino and showed right grade 4 reflux with 100cc, left  grade 1 reflux with 275 cc.  Both drained after retrograde.     The left ureteral orifice was intubated with a ureteral catheter and contrast was injected in retrograde fashion. This showed tortuos ureter w/o hydronephrosis.  The calyces were normal.  The right retrograde was done showing tortuous ureter and mild hydronephrosis.      The patient's bladder was drained via the cystoscope sheath.  A 16F chino was placed. The patient was awakened and transferred to the recovery room in stable condition having tolerated the procedure well.      Ashu Riley MD

## 2022-04-04 NOTE — ANESTHESIA POSTPROCEDURE EVALUATION
Anesthesia Post Evaluation    Patient: Socorro Lucas    Procedure(s) Performed: Procedure(s) (LRB):  PYELOGRAM, RETROGRADE (Bilateral)  URETEROSCOPY (Left)  CYSTOSCOPY, WITH CYSTOGRAM (N/A)  INSERTION, CATHETER, URETER (N/A)    Final Anesthesia Type: general      Patient location during evaluation: PACU  Patient participation: Yes- Able to Participate  Level of consciousness: awake and alert  Post-procedure vital signs: reviewed and stable  Pain management: adequate  Airway patency: patent    PONV status at discharge: No PONV  Anesthetic complications: no      Cardiovascular status: blood pressure returned to baseline  Respiratory status: unassisted  Hydration status: euvolemic  Follow-up not needed.          Vitals Value Taken Time   /56 04/04/22 0900   Temp 36.3 °C (97.3 °F) 04/04/22 0900   Pulse 52 04/04/22 0903   Resp 41 04/04/22 0903   SpO2 87 % 04/04/22 0903   Vitals shown include unvalidated device data.      No case tracking events are documented in the log.      Pain/Marcela Score: Pain Rating Prior to Med Admin: 5 (4/4/2022  8:52 AM)  Marcela Score: 8 (4/4/2022  9:00 AM)

## 2022-04-12 ENCOUNTER — TELEPHONE (OUTPATIENT)
Dept: UROLOGY | Facility: CLINIC | Age: 72
End: 2022-04-12
Payer: MEDICARE

## 2022-04-12 ENCOUNTER — OFFICE VISIT (OUTPATIENT)
Dept: UROLOGY | Facility: CLINIC | Age: 72
End: 2022-04-12
Payer: MEDICARE

## 2022-04-12 VITALS
HEART RATE: 63 BPM | WEIGHT: 207.31 LBS | HEIGHT: 61 IN | DIASTOLIC BLOOD PRESSURE: 56 MMHG | SYSTOLIC BLOOD PRESSURE: 129 MMHG | BODY MASS INDEX: 39.14 KG/M2

## 2022-04-12 DIAGNOSIS — R33.9 CHRONIC RETENTION OF URINE: Primary | ICD-10-CM

## 2022-04-12 DIAGNOSIS — N39.0 RECURRENT UTI: Primary | ICD-10-CM

## 2022-04-12 DIAGNOSIS — R33.9 URINARY RETENTION: ICD-10-CM

## 2022-04-12 PROCEDURE — 4010F PR ACE/ARB THEARPY RXD/TAKEN: ICD-10-PCS | Mod: CPTII,S$GLB,, | Performed by: UROLOGY

## 2022-04-12 PROCEDURE — 3078F PR MOST RECENT DIASTOLIC BLOOD PRESSURE < 80 MM HG: ICD-10-PCS | Mod: CPTII,S$GLB,, | Performed by: UROLOGY

## 2022-04-12 PROCEDURE — 4010F ACE/ARB THERAPY RXD/TAKEN: CPT | Mod: CPTII,S$GLB,, | Performed by: UROLOGY

## 2022-04-12 PROCEDURE — 3288F FALL RISK ASSESSMENT DOCD: CPT | Mod: CPTII,S$GLB,, | Performed by: UROLOGY

## 2022-04-12 PROCEDURE — 99214 OFFICE O/P EST MOD 30 MIN: CPT | Mod: S$GLB,,, | Performed by: UROLOGY

## 2022-04-12 PROCEDURE — 3008F PR BODY MASS INDEX (BMI) DOCUMENTED: ICD-10-PCS | Mod: CPTII,S$GLB,, | Performed by: UROLOGY

## 2022-04-12 PROCEDURE — 1125F PR PAIN SEVERITY QUANTIFIED, PAIN PRESENT: ICD-10-PCS | Mod: CPTII,S$GLB,, | Performed by: UROLOGY

## 2022-04-12 PROCEDURE — 1101F PT FALLS ASSESS-DOCD LE1/YR: CPT | Mod: CPTII,S$GLB,, | Performed by: UROLOGY

## 2022-04-12 PROCEDURE — 99214 PR OFFICE/OUTPT VISIT, EST, LEVL IV, 30-39 MIN: ICD-10-PCS | Mod: S$GLB,,, | Performed by: UROLOGY

## 2022-04-12 PROCEDURE — 99999 PR PBB SHADOW E&M-EST. PATIENT-LVL IV: CPT | Mod: PBBFAC,,, | Performed by: UROLOGY

## 2022-04-12 PROCEDURE — 3078F DIAST BP <80 MM HG: CPT | Mod: CPTII,S$GLB,, | Performed by: UROLOGY

## 2022-04-12 PROCEDURE — 1125F AMNT PAIN NOTED PAIN PRSNT: CPT | Mod: CPTII,S$GLB,, | Performed by: UROLOGY

## 2022-04-12 PROCEDURE — 3074F SYST BP LT 130 MM HG: CPT | Mod: CPTII,S$GLB,, | Performed by: UROLOGY

## 2022-04-12 PROCEDURE — 3074F PR MOST RECENT SYSTOLIC BLOOD PRESSURE < 130 MM HG: ICD-10-PCS | Mod: CPTII,S$GLB,, | Performed by: UROLOGY

## 2022-04-12 PROCEDURE — 99999 PR PBB SHADOW E&M-EST. PATIENT-LVL IV: ICD-10-PCS | Mod: PBBFAC,,, | Performed by: UROLOGY

## 2022-04-12 PROCEDURE — 1159F PR MEDICATION LIST DOCUMENTED IN MEDICAL RECORD: ICD-10-PCS | Mod: CPTII,S$GLB,, | Performed by: UROLOGY

## 2022-04-12 PROCEDURE — 3008F BODY MASS INDEX DOCD: CPT | Mod: CPTII,S$GLB,, | Performed by: UROLOGY

## 2022-04-12 PROCEDURE — 1101F PR PT FALLS ASSESS DOC 0-1 FALLS W/OUT INJ PAST YR: ICD-10-PCS | Mod: CPTII,S$GLB,, | Performed by: UROLOGY

## 2022-04-12 PROCEDURE — 3288F PR FALLS RISK ASSESSMENT DOCUMENTED: ICD-10-PCS | Mod: CPTII,S$GLB,, | Performed by: UROLOGY

## 2022-04-12 PROCEDURE — 1160F PR REVIEW ALL MEDS BY PRESCRIBER/CLIN PHARMACIST DOCUMENTED: ICD-10-PCS | Mod: CPTII,S$GLB,, | Performed by: UROLOGY

## 2022-04-12 PROCEDURE — 1160F RVW MEDS BY RX/DR IN RCRD: CPT | Mod: CPTII,S$GLB,, | Performed by: UROLOGY

## 2022-04-12 PROCEDURE — 1159F MED LIST DOCD IN RCRD: CPT | Mod: CPTII,S$GLB,, | Performed by: UROLOGY

## 2022-04-12 RX ORDER — FLUTICASONE FUROATE, UMECLIDINIUM BROMIDE AND VILANTEROL TRIFENATATE 200; 62.5; 25 UG/1; UG/1; UG/1
1 POWDER RESPIRATORY (INHALATION) DAILY
COMMUNITY
Start: 2021-12-16

## 2022-04-12 RX ORDER — POTASSIUM CHLORIDE 750 MG/1
CAPSULE, EXTENDED RELEASE ORAL
Status: ON HOLD | COMMUNITY
Start: 2022-01-07 | End: 2023-04-07

## 2022-04-12 NOTE — Clinical Note
Referral for UDS, retention /recurrent UTIs/reflux on cystogram in OR. Thanks, let me know if questions.

## 2022-04-12 NOTE — PROGRESS NOTES
Subjective:       Patient ID: Socorro Lucas is a 71 y.o. female.    Chief Complaint: Follow-up (From procedure)     This is a 71 y.o.  female patient that is an established patient. The patient was referred to me by PCP for multiple issues: recurrent UTI, dysuria, hematuria, h/o stones.  H/o recurrent UTIs, no cultures available.  States currently with pain to lower abdomen.  No flank pain.  Recently had hematuria with UTI per report, dark red urine with small clots, past smoker.  Reports UTI every 1-2 months and treated with antibiotic by PCP.  Symptoms: abd pain, frequency, urgency, gross hematuria.  H/o stones in 2015 and 2016 requiring intervention,  Started K citrate at the time.      PVR today 324.  Myrbetriq started recently by PCP, also on tamsulosin.     3/8/22: follow up CT urogram, enterococcus UTI treated, states feels better, voiding improved.   today   4/12/22: follow up after procedure; irritation and not happy with chino.  No N/V/F/C.     Lab Results   Component Value Date    CREATININE 1.3 02/14/2022       ---  Past Medical History:   Diagnosis Date    COPD (chronic obstructive pulmonary disease)     DVT (deep venous thrombosis) left leg post cholecystectomy    Hypertension     Hypothyroidism     Kidney stone     Thyroid disease     Urinary tract infection     Vaginal infection        Past Surgical History:   Procedure Laterality Date    APPENDECTOMY      BREAST SURGERY      biopsy only x 6    CARPAL TUNNEL RELEASE Bilateral     CHOLECYSTECTOMY      CYSTOSCOPY      CYSTOSCOPY WITH CYSTOGRAPHY N/A 4/4/2022    Procedure: CYSTOSCOPY, WITH CYSTOGRAM;  Surgeon: Ashu Riley MD;  Location: [facility];  Service: Urology;  Laterality: N/A;    CHARI FILTER PLACEMENT      INSERTION OF URETERAL CATHETER N/A 4/4/2022    Procedure: INSERTION, CATHETER, URETER;  Surgeon: Ashu Riley MD;  Location: [facility] OR;  Service: Urology;  Laterality: N/A;    INSERTION OF  URETERAL CATHETER  2022    Procedure: ;  Surgeon: Ashu Riley MD;  Location: [facility] OR;  Service: Urology;;    RETROGRADE PYELOGRAPHY Bilateral 2022    Procedure: PYELOGRAM, RETROGRADE;  Surgeon: Ashu Riley MD;  Location: [facility] OR;  Service: Urology;  Laterality: Bilateral;    URETEROSCOPY Left 2022    Procedure: URETEROSCOPY;  Surgeon: Ashu Riley MD;  Location: [facility] OR;  Service: Urology;  Laterality: Left;       Family History   Problem Relation Age of Onset    No Known Problems Mother     No Known Problems Father     Kidney disease Neg Hx        Social History     Tobacco Use    Smoking status: Former Smoker     Quit date: 1996     Years since quittin.2    Smokeless tobacco: Never Used   Substance Use Topics    Alcohol use: No     Alcohol/week: 0.0 standard drinks    Drug use: No       Current Outpatient Medications on File Prior to Visit   Medication Sig Dispense Refill    acetaminophen (TYLENOL) 500 MG tablet Take 500 mg by mouth every 6 (six) hours as needed for Pain.      albuterol (PROVENTIL/VENTOLIN HFA) 90 mcg/actuation inhaler Inhale 2 puffs into the lungs every 6 (six) hours as needed for Wheezing.      beta-carotene,A,-vits C,E/mins (OCUVITE ORAL) Take 1 tablet by mouth once daily.      diphenoxylate-atropine 2.5-0.025 mg (LOMOTIL) 2.5-0.025 mg per tablet       ergocalciferol, vitamin D2, (VITAMIN D ORAL) Take 25 mcg by mouth once daily.      folic acid (FOLVITE) 800 MCG Tab Take 800 mcg by mouth once daily.      furosemide (LASIX) 40 MG tablet Take 40 mg by mouth as needed.      gabapentin (NEURONTIN) 100 MG capsule Take 100 mg by mouth 2 (two) times daily.      hydrochlorothiazide (HYDRODIURIL) 25 MG tablet Take 25 mg by mouth once daily.      levothyroxine (SYNTHROID) 125 MCG tablet Take 125 mcg by mouth before breakfast.      lisinopriL (PRINIVIL,ZESTRIL) 40 MG tablet Take 40 mg by mouth once daily.      metoprolol succinate  (TOPROL-XL) 25 MG 24 hr tablet Take 25 mg by mouth once daily.      multivit with calcium,iron,min (WOMEN'S DAILY FORMULA ORAL) Take 1 tablet by mouth once daily.      potassium chloride (MICRO-K) 10 MEQ CpSR       promethazine (PHENERGAN) 25 MG tablet Take 1 tablet (25 mg total) by mouth every 4 (four) hours. 20 tablet 0    tamsulosin (FLOMAX) 0.4 mg Cap Take 0.4 mg by mouth once daily.      tetracycline (ACHROMYCIN,SUMYCIN) 500 MG capsule Take 1 capsule (500 mg total) by mouth 2 (two) times daily. 20 capsule 0    tiotropium (SPIRIVA) 18 mcg inhalation capsule Inhale 18 mcg into the lungs once daily.      tolterodine (DETROL) 2 MG Tab       TRELEGY ELLIPTA 200-62.5-25 mcg inhaler       [DISCONTINUED] MYRBETRIQ 50 mg Tb24       [DISCONTINUED] tramadol (ULTRAM) 50 mg tablet Take 50 mg by mouth every 6 (six) hours as needed for Pain.      [] ampicillin (PRINCIPEN) 500 MG capsule Take 1 capsule (500 mg total) by mouth 4 (four) times daily. for 7 days 28 capsule 0    [DISCONTINUED] fluticasone/umeclidin/vilanter (TRELEGY ELLIPTA INHL) Inhale into the lungs.      [DISCONTINUED] potassium chloride (KLOR-CON) 10 MEQ TbSR Take 10 mEq by mouth as needed.       Current Facility-Administered Medications on File Prior to Visit   Medication Dose Route Frequency Provider Last Rate Last Admin    acetaminophen tablet 1,000 mg  1,000 mg Oral On Call Procedure Ashu Riley MD        [DISCONTINUED] ceFAZolin (ANCEF) 2 g in dextrose 5 % 50 mL IVPB  2 g Intravenous On Call Procedure Ashu Riley MD        [DISCONTINUED] LIDOcaine (PF) 10 mg/ml (1%) injection 10 mg  1 mL Intradermal Once Ashu Riley MD           Review of patient's allergies indicates:   Allergen Reactions    Macrobid [nitrofurantoin monohyd/m-cryst] Swelling    Sulfa (sulfonamide antibiotics) Anaphylaxis    Asa [aspirin] Other (See Comments)    Codeine Nausea Only       Review of Systems   Constitutional: Positive for  activity change and appetite change. Negative for chills and fever.   Respiratory: Negative for cough, chest tightness and shortness of breath.    Cardiovascular: Negative for chest pain.   Gastrointestinal: Positive for abdominal pain. Negative for abdominal distention, nausea and vomiting.   Genitourinary: Positive for difficulty urinating, dysuria, frequency, hematuria, pelvic pain and urgency. Negative for flank pain.   Neurological: Negative for dizziness.   Hematological: Does not bruise/bleed easily.   Psychiatric/Behavioral: Negative for agitation.       Objective:      Physical Exam  HENT:      Head: Normocephalic.   Cardiovascular:      Pulses: Normal pulses.   Pulmonary:      Effort: Pulmonary effort is normal.   Abdominal:      General: Abdomen is flat. There is no distension.      Palpations: Abdomen is soft.      Tenderness: There is no abdominal tenderness. There is no right CVA tenderness, left CVA tenderness or guarding.   Musculoskeletal:      Cervical back: Normal range of motion.   Skin:     General: Skin is warm.   Neurological:      General: No focal deficit present.      Mental Status: She is alert.           Results for orders placed or performed during the hospital encounter of 02/16/22 (from the past 2160 hour(s))   CT Urogram Abd Pelvis W WO    Impression    Abnormal appearance of the urinary bladder suggesting chronic inflammation and or outlet obstruction    Bilateral hydroureter and mild right-sided hydronephrosis possibly secondary to lower urinary tract abnormalities.  Questionable filling defect versus mixing artifact within the distal left ureter.  Consider direct visualization as clinically felt appropriate.    All CT scans at this facility use dose modulation, iterative reconstruction, and/or weight based dosing when appropriate to reduce radiation dose to as low as reasonable achievable.      Electronically signed by: Lavon Hoskins Jr  Date:    02/16/2022  Time:    12:39        Assessment:     Problem Noted   Gross Hematuria 2/14/2022    With recent UTI per patient no culture available, past smoker.   --CTU 2/22: bilateral hydroureter, abrupt contrast cut off on left, distended bladder/trabeculation  --4/4/22 cysto, bilateral RPGs, left URS, cystogram--findings no left lesions, chronic cystitis, bilateral reflux w/o significant hydronephrosis     Urinary Retention 4/12/2022    , 89 and on cystoscopy, has BL reflux   Cath placed 4/4/22     Recurrent Uti 2/14/2022    Enterococcus 2/2022 treated  Retention as above with reflux     Kidney Stones 10/7/2015    Treatment in 2015 and 2016, was on K citra in past  2mm left stone on CTU 2/2022     Nephrolithiasis (Resolved) 9/14/2016   Lesion of Bladder (Resolved) 9/14/2016       Plan:     1.  Discussed OR findings.  Would recommend leave chino or start CIC. She is unable to do CIC.  She does not want to keep chino, given retention/recurrent UTI/reflux strongly recommend keep chino for now.   2.  Will have her do urodynamics and follow up after to review, discussed procedure in detail.  Will schedule when able.        Ashu Riley MD

## 2022-04-12 NOTE — H&P (VIEW-ONLY)
Subjective:       Patient ID: Socorro Lucas is a 71 y.o. female.    Chief Complaint: Follow-up (From procedure)     This is a 71 y.o.  female patient that is an established patient. The patient was referred to me by PCP for multiple issues: recurrent UTI, dysuria, hematuria, h/o stones.  H/o recurrent UTIs, no cultures available.  States currently with pain to lower abdomen.  No flank pain.  Recently had hematuria with UTI per report, dark red urine with small clots, past smoker.  Reports UTI every 1-2 months and treated with antibiotic by PCP.  Symptoms: abd pain, frequency, urgency, gross hematuria.  H/o stones in 2015 and 2016 requiring intervention,  Started K citrate at the time.      PVR today 324.  Myrbetriq started recently by PCP, also on tamsulosin.     3/8/22: follow up CT urogram, enterococcus UTI treated, states feels better, voiding improved.   today   4/12/22: follow up after procedure; irritation and not happy with chino.  No N/V/F/C.     Lab Results   Component Value Date    CREATININE 1.3 02/14/2022       ---  Past Medical History:   Diagnosis Date    COPD (chronic obstructive pulmonary disease)     DVT (deep venous thrombosis) left leg post cholecystectomy    Hypertension     Hypothyroidism     Kidney stone     Thyroid disease     Urinary tract infection     Vaginal infection        Past Surgical History:   Procedure Laterality Date    APPENDECTOMY      BREAST SURGERY      biopsy only x 6    CARPAL TUNNEL RELEASE Bilateral     CHOLECYSTECTOMY      CYSTOSCOPY      CYSTOSCOPY WITH CYSTOGRAPHY N/A 4/4/2022    Procedure: CYSTOSCOPY, WITH CYSTOGRAM;  Surgeon: Ashu Riley MD;  Location: [facility] OR;  Service: Urology;  Laterality: N/A;    CHARI FILTER PLACEMENT      INSERTION OF URETERAL CATHETER N/A 4/4/2022    Procedure: INSERTION, CATHETER, URETER;  Surgeon: Ashu Riley MD;  Location: [facility] OR;  Service: Urology;  Laterality: N/A;    INSERTION OF  URETERAL CATHETER  2022    Procedure: ;  Surgeon: Ashu Riley MD;  Location: [facility] OR;  Service: Urology;;    RETROGRADE PYELOGRAPHY Bilateral 2022    Procedure: PYELOGRAM, RETROGRADE;  Surgeon: Ashu Riley MD;  Location: [facility] OR;  Service: Urology;  Laterality: Bilateral;    URETEROSCOPY Left 2022    Procedure: URETEROSCOPY;  Surgeon: Ashu Riley MD;  Location: [facility] OR;  Service: Urology;  Laterality: Left;       Family History   Problem Relation Age of Onset    No Known Problems Mother     No Known Problems Father     Kidney disease Neg Hx        Social History     Tobacco Use    Smoking status: Former Smoker     Quit date: 1996     Years since quittin.2    Smokeless tobacco: Never Used   Substance Use Topics    Alcohol use: No     Alcohol/week: 0.0 standard drinks    Drug use: No       Current Outpatient Medications on File Prior to Visit   Medication Sig Dispense Refill    acetaminophen (TYLENOL) 500 MG tablet Take 500 mg by mouth every 6 (six) hours as needed for Pain.      albuterol (PROVENTIL/VENTOLIN HFA) 90 mcg/actuation inhaler Inhale 2 puffs into the lungs every 6 (six) hours as needed for Wheezing.      beta-carotene,A,-vits C,E/mins (OCUVITE ORAL) Take 1 tablet by mouth once daily.      diphenoxylate-atropine 2.5-0.025 mg (LOMOTIL) 2.5-0.025 mg per tablet       ergocalciferol, vitamin D2, (VITAMIN D ORAL) Take 25 mcg by mouth once daily.      folic acid (FOLVITE) 800 MCG Tab Take 800 mcg by mouth once daily.      furosemide (LASIX) 40 MG tablet Take 40 mg by mouth as needed.      gabapentin (NEURONTIN) 100 MG capsule Take 100 mg by mouth 2 (two) times daily.      hydrochlorothiazide (HYDRODIURIL) 25 MG tablet Take 25 mg by mouth once daily.      levothyroxine (SYNTHROID) 125 MCG tablet Take 125 mcg by mouth before breakfast.      lisinopriL (PRINIVIL,ZESTRIL) 40 MG tablet Take 40 mg by mouth once daily.      metoprolol succinate  (TOPROL-XL) 25 MG 24 hr tablet Take 25 mg by mouth once daily.      multivit with calcium,iron,min (WOMEN'S DAILY FORMULA ORAL) Take 1 tablet by mouth once daily.      potassium chloride (MICRO-K) 10 MEQ CpSR       promethazine (PHENERGAN) 25 MG tablet Take 1 tablet (25 mg total) by mouth every 4 (four) hours. 20 tablet 0    tamsulosin (FLOMAX) 0.4 mg Cap Take 0.4 mg by mouth once daily.      tetracycline (ACHROMYCIN,SUMYCIN) 500 MG capsule Take 1 capsule (500 mg total) by mouth 2 (two) times daily. 20 capsule 0    tiotropium (SPIRIVA) 18 mcg inhalation capsule Inhale 18 mcg into the lungs once daily.      tolterodine (DETROL) 2 MG Tab       TRELEGY ELLIPTA 200-62.5-25 mcg inhaler       [DISCONTINUED] MYRBETRIQ 50 mg Tb24       [DISCONTINUED] tramadol (ULTRAM) 50 mg tablet Take 50 mg by mouth every 6 (six) hours as needed for Pain.      [] ampicillin (PRINCIPEN) 500 MG capsule Take 1 capsule (500 mg total) by mouth 4 (four) times daily. for 7 days 28 capsule 0    [DISCONTINUED] fluticasone/umeclidin/vilanter (TRELEGY ELLIPTA INHL) Inhale into the lungs.      [DISCONTINUED] potassium chloride (KLOR-CON) 10 MEQ TbSR Take 10 mEq by mouth as needed.       Current Facility-Administered Medications on File Prior to Visit   Medication Dose Route Frequency Provider Last Rate Last Admin    acetaminophen tablet 1,000 mg  1,000 mg Oral On Call Procedure Ashu Riley MD        [DISCONTINUED] ceFAZolin (ANCEF) 2 g in dextrose 5 % 50 mL IVPB  2 g Intravenous On Call Procedure Ashu Riley MD        [DISCONTINUED] LIDOcaine (PF) 10 mg/ml (1%) injection 10 mg  1 mL Intradermal Once Ashu Riley MD           Review of patient's allergies indicates:   Allergen Reactions    Macrobid [nitrofurantoin monohyd/m-cryst] Swelling    Sulfa (sulfonamide antibiotics) Anaphylaxis    Asa [aspirin] Other (See Comments)    Codeine Nausea Only       Review of Systems   Constitutional: Positive for  activity change and appetite change. Negative for chills and fever.   Respiratory: Negative for cough, chest tightness and shortness of breath.    Cardiovascular: Negative for chest pain.   Gastrointestinal: Positive for abdominal pain. Negative for abdominal distention, nausea and vomiting.   Genitourinary: Positive for difficulty urinating, dysuria, frequency, hematuria, pelvic pain and urgency. Negative for flank pain.   Neurological: Negative for dizziness.   Hematological: Does not bruise/bleed easily.   Psychiatric/Behavioral: Negative for agitation.       Objective:      Physical Exam  HENT:      Head: Normocephalic.   Cardiovascular:      Pulses: Normal pulses.   Pulmonary:      Effort: Pulmonary effort is normal.   Abdominal:      General: Abdomen is flat. There is no distension.      Palpations: Abdomen is soft.      Tenderness: There is no abdominal tenderness. There is no right CVA tenderness, left CVA tenderness or guarding.   Musculoskeletal:      Cervical back: Normal range of motion.   Skin:     General: Skin is warm.   Neurological:      General: No focal deficit present.      Mental Status: She is alert.           Results for orders placed or performed during the hospital encounter of 02/16/22 (from the past 2160 hour(s))   CT Urogram Abd Pelvis W WO    Impression    Abnormal appearance of the urinary bladder suggesting chronic inflammation and or outlet obstruction    Bilateral hydroureter and mild right-sided hydronephrosis possibly secondary to lower urinary tract abnormalities.  Questionable filling defect versus mixing artifact within the distal left ureter.  Consider direct visualization as clinically felt appropriate.    All CT scans at this facility use dose modulation, iterative reconstruction, and/or weight based dosing when appropriate to reduce radiation dose to as low as reasonable achievable.      Electronically signed by: Lavon Hoskins Jr  Date:    02/16/2022  Time:    12:39        Assessment:     Problem Noted   Gross Hematuria 2/14/2022    With recent UTI per patient no culture available, past smoker.   --CTU 2/22: bilateral hydroureter, abrupt contrast cut off on left, distended bladder/trabeculation  --4/4/22 cysto, bilateral RPGs, left URS, cystogram--findings no left lesions, chronic cystitis, bilateral reflux w/o significant hydronephrosis     Urinary Retention 4/12/2022    , 280 and on cystoscopy, has BL reflux   Cath placed 4/4/22     Recurrent Uti 2/14/2022    Enterococcus 2/2022 treated  Retention as above with reflux     Kidney Stones 10/7/2015    Treatment in 2015 and 2016, was on K citra in past  2mm left stone on CTU 2/2022     Nephrolithiasis (Resolved) 9/14/2016   Lesion of Bladder (Resolved) 9/14/2016       Plan:     1.  Discussed OR findings.  Would recommend leave chino or start CIC. She is unable to do CIC.  She does not want to keep chino, given retention/recurrent UTI/reflux strongly recommend keep chino for now.   2.  Will have her do urodynamics and follow up after to review, discussed procedure in detail.  Will schedule when able.        Ashu Riley MD

## 2022-04-14 ENCOUNTER — TELEPHONE (OUTPATIENT)
Dept: UROLOGY | Facility: CLINIC | Age: 72
End: 2022-04-14
Payer: MEDICARE

## 2022-04-18 ENCOUNTER — HOSPITAL ENCOUNTER (OUTPATIENT)
Facility: HOSPITAL | Age: 72
Discharge: HOME | End: 2022-04-18
Attending: UROLOGY | Admitting: UROLOGY
Payer: MEDICARE

## 2022-04-18 VITALS
RESPIRATION RATE: 18 BRPM | HEIGHT: 61 IN | WEIGHT: 200 LBS | DIASTOLIC BLOOD PRESSURE: 60 MMHG | TEMPERATURE: 98 F | SYSTOLIC BLOOD PRESSURE: 132 MMHG | HEART RATE: 65 BPM | OXYGEN SATURATION: 97 % | BODY MASS INDEX: 37.76 KG/M2

## 2022-04-18 DIAGNOSIS — N32.9 BLADDER DISORDER: ICD-10-CM

## 2022-04-18 PROCEDURE — 27200973 HC CYSTO SUPPLY IV (URODYNAMICS): Performed by: UROLOGY

## 2022-04-18 PROCEDURE — 51600 INJECTION FOR BLADDER X-RAY: CPT | Mod: 51,,, | Performed by: UROLOGY

## 2022-04-18 PROCEDURE — 51784 ANAL/URINARY MUSCLE STUDY: CPT | Mod: 26,51,, | Performed by: UROLOGY

## 2022-04-18 PROCEDURE — 74430 PR  X-RAY CYSTOGRAM, MIN 3 VIEW: ICD-10-PCS | Mod: 26,,, | Performed by: UROLOGY

## 2022-04-18 PROCEDURE — 74430 CONTRAST X-RAY BLADDER: CPT | Mod: 26,,, | Performed by: UROLOGY

## 2022-04-18 PROCEDURE — 51797 PR VOIDING PRESS STUDY INTRA-ABDOMINAL VOID: ICD-10-PCS | Mod: 26,,, | Performed by: UROLOGY

## 2022-04-18 PROCEDURE — 51797 INTRAABDOMINAL PRESSURE TEST: CPT | Mod: 26,,, | Performed by: UROLOGY

## 2022-04-18 PROCEDURE — 51728 CYSTOMETROGRAM W/VP: CPT | Mod: 26,,, | Performed by: UROLOGY

## 2022-04-18 PROCEDURE — 51784 PR ANAL/URINARY MUSCLE STUDY: ICD-10-PCS | Mod: 26,51,, | Performed by: UROLOGY

## 2022-04-18 PROCEDURE — 51728 PR COMPLEX CYSTOMETROGRAM VOIDING PRESSURE STUDIES: ICD-10-PCS | Mod: 26,,, | Performed by: UROLOGY

## 2022-04-18 PROCEDURE — 36000707: Performed by: UROLOGY

## 2022-04-18 PROCEDURE — 36000706: Performed by: UROLOGY

## 2022-04-18 PROCEDURE — 51600 PR INJECTION FOR BLADDER X-RAY: ICD-10-PCS | Mod: 51,,, | Performed by: UROLOGY

## 2022-04-18 NOTE — OP NOTE
Urodynamic Report    [facility] Department of Urology       Referring Physician:  Trae Em MD    YOB: 1950  Date of Exam: 4/18/2022    This is a 71 y.o.  female patient referred for urodynamic evaluation.  The patient was referred by PCP for multiple issues: recurrent UTI, dysuria, hematuria, h/o stones.  H/o recurrent UTIs, no cultures available.  States currently with pain to lower abdomen.  No flank pain.  Recently had hematuria with UTI per report, dark red urine with small clots, past smoker.  Reports UTI every 1-2 months and treated with antibiotic by PCP.  Symptoms: abd pain, frequency, urgency, gross hematuria.  H/o stones in 2015 and 2016 requiring intervention,  Started K citrate at the time.       PVR in office as 324.  Myrbetriq started recently by PCP, also on tamsulosin.      3/8/22: follow up CT urogram, enterococcus UTI treated, states feels better, voiding improved.   today   4/12/22: follow up after procedure; irritation and not happy with chino.  No N/V/F/C.     Cystometrogram:    Position: Sitting  Filling Rate: 20 mL/sec   Catheter: 7F  Fluid:Conray       Cath PVR prior:N/A (catheter in place) Voiding Diary Capacity: Not Applicable   First Sensation: 4 mL First Desire: 68 mL   Strong Desire: 79 mL Cystometric Capacity: 190 mL       Pdet at skilled nursing: 8 cm H2O Compliance: Normal       Detrusor Overactivity (DO): Absent  Volume first DO: No DO   Max DO Pressure: No DO Urgency Incontinence: Absent        Leak Point Pressure Testing:        Volume Tested: 150 mL  Abdominal Leak Point Pressure: No Leak   Stress Induced DO: Absent          Voiding Study:        Voided Volume: 0 mL PVR: 190 mL   Maximum Flow: 0 mL/sec Average Flow 0 mL/sec   Max Pdet: 0 cmH2O  Pdet at Max Flow: 0 cmH2O   EMG Storage: Normal Recruitment  EMG Voiding: Did not void       Fluroscopic Imaging:        Bladder Contour: Smooth Vesicoureteral Reflux:Bilateral VUR(I)   Bladder Neck at Rest: closed  Bladder Neck Voiding:Did not void       Impression:        Sensation:Early    Capacity: Small - (patient chronically cath)    Compliance:Normal    Detrusor Overactivity:Absent    Continence: No Incontinence    Contractility: Hypocontractility    Emptying:Unsatisfactory - Hypocontractility    Coordination:Patient Did Not Void          Summary:  This study was performed in accordance with the current AUA/SUFU Guideline on Adult Urodynamics. On filling phase she demonstrates early first and strong desire with a small bladder capacity. There is no detrusor overactivity (DO) demonstrated on this exam (though absence of DO on urodynamic evaluation does not exclude it as causative agent of urgency symptoms). Bladder compliance at capacity is normal. On fluoroscopy, the bladder contour is smooth. She demonstrates bilateral grade I reflux from early filling. This never progressed as she filled.     On stress testing, with adequate cough and valsalva effort, there is no DRAGAN demonstrated and patient reports no clinical history of DRAGAN.    On voiding phase, dysfunctional voiding is present with no underlying detrusor contraction demonstrated, despite approximately 25 minutes and filling to nearly discomfort. She notes this matches her voiding pattern before the catheter was placed. The catheter was replaced at the end of the procedure as she does not think she could self-catheterize.     In summary, she has mild bilateral VUR. Bladder compliance is normal. Although reflux can lead to the overestimate of bladder compliance, the reflux in this case was small and was not persistent throughout the entirety of filling, making the compliance measurements here likely accurate.     The patient could consider SNM. However, she would likely either need to begin CISC or have placement of a suprapubic tube to facilitate testing as SNM testing for CUR is extremely difficult in a patient with an indwelling urethral catheter.

## 2022-04-22 ENCOUNTER — OFFICE VISIT (OUTPATIENT)
Dept: UROLOGY | Facility: CLINIC | Age: 72
End: 2022-04-22
Payer: MEDICARE

## 2022-04-22 VITALS
WEIGHT: 199.94 LBS | SYSTOLIC BLOOD PRESSURE: 131 MMHG | HEIGHT: 61 IN | BODY MASS INDEX: 37.75 KG/M2 | HEART RATE: 61 BPM | DIASTOLIC BLOOD PRESSURE: 60 MMHG

## 2022-04-22 DIAGNOSIS — R33.9 URINARY RETENTION: ICD-10-CM

## 2022-04-22 PROCEDURE — 99214 OFFICE O/P EST MOD 30 MIN: CPT | Mod: S$GLB,,, | Performed by: UROLOGY

## 2022-04-22 PROCEDURE — 1126F PR PAIN SEVERITY QUANTIFIED, NO PAIN PRESENT: ICD-10-PCS | Mod: CPTII,S$GLB,, | Performed by: UROLOGY

## 2022-04-22 PROCEDURE — 1160F RVW MEDS BY RX/DR IN RCRD: CPT | Mod: CPTII,S$GLB,, | Performed by: UROLOGY

## 2022-04-22 PROCEDURE — 1159F PR MEDICATION LIST DOCUMENTED IN MEDICAL RECORD: ICD-10-PCS | Mod: CPTII,S$GLB,, | Performed by: UROLOGY

## 2022-04-22 PROCEDURE — 99214 PR OFFICE/OUTPT VISIT, EST, LEVL IV, 30-39 MIN: ICD-10-PCS | Mod: S$GLB,,, | Performed by: UROLOGY

## 2022-04-22 PROCEDURE — 1160F PR REVIEW ALL MEDS BY PRESCRIBER/CLIN PHARMACIST DOCUMENTED: ICD-10-PCS | Mod: CPTII,S$GLB,, | Performed by: UROLOGY

## 2022-04-22 PROCEDURE — 3008F PR BODY MASS INDEX (BMI) DOCUMENTED: ICD-10-PCS | Mod: CPTII,S$GLB,, | Performed by: UROLOGY

## 2022-04-22 PROCEDURE — 3008F BODY MASS INDEX DOCD: CPT | Mod: CPTII,S$GLB,, | Performed by: UROLOGY

## 2022-04-22 PROCEDURE — 3288F PR FALLS RISK ASSESSMENT DOCUMENTED: ICD-10-PCS | Mod: CPTII,S$GLB,, | Performed by: UROLOGY

## 2022-04-22 PROCEDURE — 99999 PR PBB SHADOW E&M-EST. PATIENT-LVL IV: CPT | Mod: PBBFAC,,, | Performed by: UROLOGY

## 2022-04-22 PROCEDURE — 1101F PR PT FALLS ASSESS DOC 0-1 FALLS W/OUT INJ PAST YR: ICD-10-PCS | Mod: CPTII,S$GLB,, | Performed by: UROLOGY

## 2022-04-22 PROCEDURE — 1159F MED LIST DOCD IN RCRD: CPT | Mod: CPTII,S$GLB,, | Performed by: UROLOGY

## 2022-04-22 PROCEDURE — 3075F SYST BP GE 130 - 139MM HG: CPT | Mod: CPTII,S$GLB,, | Performed by: UROLOGY

## 2022-04-22 PROCEDURE — 4010F ACE/ARB THERAPY RXD/TAKEN: CPT | Mod: CPTII,S$GLB,, | Performed by: UROLOGY

## 2022-04-22 PROCEDURE — 3075F PR MOST RECENT SYSTOLIC BLOOD PRESS GE 130-139MM HG: ICD-10-PCS | Mod: CPTII,S$GLB,, | Performed by: UROLOGY

## 2022-04-22 PROCEDURE — 1126F AMNT PAIN NOTED NONE PRSNT: CPT | Mod: CPTII,S$GLB,, | Performed by: UROLOGY

## 2022-04-22 PROCEDURE — 99999 PR PBB SHADOW E&M-EST. PATIENT-LVL IV: ICD-10-PCS | Mod: PBBFAC,,, | Performed by: UROLOGY

## 2022-04-22 PROCEDURE — 1101F PT FALLS ASSESS-DOCD LE1/YR: CPT | Mod: CPTII,S$GLB,, | Performed by: UROLOGY

## 2022-04-22 PROCEDURE — 3288F FALL RISK ASSESSMENT DOCD: CPT | Mod: CPTII,S$GLB,, | Performed by: UROLOGY

## 2022-04-22 PROCEDURE — 3078F DIAST BP <80 MM HG: CPT | Mod: CPTII,S$GLB,, | Performed by: UROLOGY

## 2022-04-22 PROCEDURE — 3078F PR MOST RECENT DIASTOLIC BLOOD PRESSURE < 80 MM HG: ICD-10-PCS | Mod: CPTII,S$GLB,, | Performed by: UROLOGY

## 2022-04-22 PROCEDURE — 4010F PR ACE/ARB THEARPY RXD/TAKEN: ICD-10-PCS | Mod: CPTII,S$GLB,, | Performed by: UROLOGY

## 2022-04-22 NOTE — PROGRESS NOTES
Subjective:       Patient ID: Socorro Lucas is a 71 y.o. female.    Chief Complaint: Follow-up (After SUDS)     This is a 71 y.o.  female patient that is an established patient. The patient was referred to me by PCP for multiple issues: recurrent UTI, dysuria, hematuria, h/o stones.  H/o recurrent UTIs, no cultures available.  States currently with pain to lower abdomen.  No flank pain.  Recently had hematuria with UTI per report, dark red urine with small clots, past smoker.  Reports UTI every 1-2 months and treated with antibiotic by PCP.  Symptoms: abd pain, frequency, urgency, gross hematuria.  H/o stones in 2015 and 2016 requiring intervention,  Started K citrate at the time.      PVR today 324.  Myrbetriq started recently by PCP, also on tamsulosin.     3/8/22: follow up CT urogram, enterococcus UTI treated, states feels better, voiding improved.   today   4/12/22: follow up after procedure; irritation and not happy with chino.  No N/V/F/C.   4/22/22: follow up after urodynamics, catheter discomfort improving     Lab Results   Component Value Date    CREATININE 1.3 02/14/2022       ---  Past Medical History:   Diagnosis Date    COPD (chronic obstructive pulmonary disease)     DVT (deep venous thrombosis) left leg post cholecystectomy    Hypertension     Hypothyroidism     Kidney stone     Thyroid disease     Urinary tract infection     Vaginal infection        Past Surgical History:   Procedure Laterality Date    APPENDECTOMY      BREAST SURGERY      biopsy only x 6    CARPAL TUNNEL RELEASE Bilateral     CHOLECYSTECTOMY      CYSTOSCOPY      CYSTOSCOPY WITH CYSTOGRAPHY N/A 4/4/2022    Procedure: CYSTOSCOPY, WITH CYSTOGRAM;  Surgeon: Ashu Riley MD;  Location: [facility] OR;  Service: Urology;  Laterality: N/A;    CYSTOSCOPY WITH URODYNAMIC TESTING N/A 4/18/2022    Procedure: CYSTOSCOPY, WITH URODYNAMIC TESTING FLOUROSCOPIC;  Surgeon: Trae Em MD;  Location: [facility] OR  1ST FLR;  Service: Urology;  Laterality: N/A;  1hr    CHARI FILTER PLACEMENT      INSERTION OF URETERAL CATHETER N/A 2022    Procedure: INSERTION, CATHETER, URETER;  Surgeon: sAhu Riley MD;  Location: [facility] OR;  Service: Urology;  Laterality: N/A;    INSERTION OF URETERAL CATHETER  2022    Procedure: ;  Surgeon: Ashu Riley MD;  Location: [facility] OR;  Service: Urology;;    RETROGRADE PYELOGRAPHY Bilateral 2022    Procedure: PYELOGRAM, RETROGRADE;  Surgeon: Ashu Riley MD;  Location: [facility] OR;  Service: Urology;  Laterality: Bilateral;    URETEROSCOPY Left 2022    Procedure: URETEROSCOPY;  Surgeon: Ashu Riley MD;  Location: [facility] OR;  Service: Urology;  Laterality: Left;       Family History   Problem Relation Age of Onset    No Known Problems Mother     No Known Problems Father     Kidney disease Neg Hx        Social History     Tobacco Use    Smoking status: Former Smoker     Quit date: 1996     Years since quittin.3    Smokeless tobacco: Never Used   Substance Use Topics    Alcohol use: No     Alcohol/week: 0.0 standard drinks    Drug use: No       Current Outpatient Medications on File Prior to Visit   Medication Sig Dispense Refill    acetaminophen (TYLENOL) 500 MG tablet Take 500 mg by mouth every 6 (six) hours as needed for Pain.      albuterol (PROVENTIL/VENTOLIN HFA) 90 mcg/actuation inhaler Inhale 2 puffs into the lungs every 6 (six) hours as needed for Wheezing.      beta-carotene,A,-vits C,E/mins (OCUVITE ORAL) Take 1 tablet by mouth once daily.      diphenoxylate-atropine 2.5-0.025 mg (LOMOTIL) 2.5-0.025 mg per tablet       ergocalciferol, vitamin D2, (VITAMIN D ORAL) Take 25 mcg by mouth once daily.      folic acid (FOLVITE) 800 MCG Tab Take 800 mcg by mouth once daily.      furosemide (LASIX) 40 MG tablet Take 40 mg by mouth as needed.      gabapentin (NEURONTIN) 100 MG capsule Take 100 mg by mouth 2 (two) times daily.       hydrochlorothiazide (HYDRODIURIL) 25 MG tablet Take 25 mg by mouth once daily.      levothyroxine (SYNTHROID) 125 MCG tablet Take 125 mcg by mouth before breakfast.      lisinopriL (PRINIVIL,ZESTRIL) 40 MG tablet Take 40 mg by mouth once daily.      metoprolol succinate (TOPROL-XL) 25 MG 24 hr tablet Take 25 mg by mouth once daily.      multivit with calcium,iron,min (WOMEN'S DAILY FORMULA ORAL) Take 1 tablet by mouth once daily.      potassium chloride (MICRO-K) 10 MEQ CpSR       promethazine (PHENERGAN) 25 MG tablet Take 1 tablet (25 mg total) by mouth every 4 (four) hours. 20 tablet 0    tamsulosin (FLOMAX) 0.4 mg Cap Take 0.4 mg by mouth once daily.      tetracycline (ACHROMYCIN,SUMYCIN) 500 MG capsule Take 1 capsule (500 mg total) by mouth 2 (two) times daily. 20 capsule 0    tiotropium (SPIRIVA) 18 mcg inhalation capsule Inhale 18 mcg into the lungs once daily.      tolterodine (DETROL) 2 MG Tab       TRELEGY ELLIPTA 200-62.5-25 mcg inhaler        Current Facility-Administered Medications on File Prior to Visit   Medication Dose Route Frequency Provider Last Rate Last Admin    acetaminophen tablet 1,000 mg  1,000 mg Oral On Call Procedure Ashu Riley MD           Review of patient's allergies indicates:   Allergen Reactions    Macrobid [nitrofurantoin monohyd/m-cryst] Swelling    Sulfa (sulfonamide antibiotics) Anaphylaxis    Asa [aspirin] Other (See Comments)    Codeine Nausea Only       Review of Systems   Constitutional: Positive for activity change and appetite change. Negative for chills and fever.   Respiratory: Negative for cough, chest tightness and shortness of breath.    Cardiovascular: Negative for chest pain.   Gastrointestinal: Positive for abdominal pain. Negative for abdominal distention, nausea and vomiting.   Genitourinary: Positive for difficulty urinating, dysuria, frequency, hematuria, pelvic pain and urgency. Negative for flank pain.   Neurological: Negative for  dizziness.   Hematological: Does not bruise/bleed easily.   Psychiatric/Behavioral: Negative for agitation.       Objective:      Physical Exam  HENT:      Head: Normocephalic.   Cardiovascular:      Pulses: Normal pulses.   Pulmonary:      Effort: Pulmonary effort is normal.   Abdominal:      General: Abdomen is flat. There is no distension.      Palpations: Abdomen is soft.      Tenderness: There is no abdominal tenderness. There is no right CVA tenderness, left CVA tenderness or guarding.   Musculoskeletal:      Cervical back: Normal range of motion.   Skin:     General: Skin is warm.   Neurological:      General: No focal deficit present.      Mental Status: She is alert.           Results for orders placed or performed during the hospital encounter of 02/16/22 (from the past 2160 hour(s))   CT Urogram Abd Pelvis W WO    Impression    Abnormal appearance of the urinary bladder suggesting chronic inflammation and or outlet obstruction    Bilateral hydroureter and mild right-sided hydronephrosis possibly secondary to lower urinary tract abnormalities.  Questionable filling defect versus mixing artifact within the distal left ureter.  Consider direct visualization as clinically felt appropriate.    All CT scans at this facility use dose modulation, iterative reconstruction, and/or weight based dosing when appropriate to reduce radiation dose to as low as reasonable achievable.      Electronically signed by: Lavon Hoskins Jr  Date:    02/16/2022  Time:    12:39     UDS:      No void, normal compliance bilateral grade 1 reflux    Assessment:     Problem Noted   Gross Hematuria 2/14/2022    With recent UTI per patient no culture available, past smoker.   --CTU 2/22: bilateral hydroureter, abrupt contrast cut off on left, distended bladder/trabeculation  --4/4/22 cysto, bilateral RPGs, left URS, cystogram--findings no left lesions, chronic cystitis, bilateral reflux w/o significant hydronephrosis     Urinary  Retention 4/12/2022    , 280 and on cystoscopy, has BL reflux   Cath placed 4/4/22  UDS 4/22:  No void, normal compliance bilateral grade 1 reflux     Recurrent Uti 2/14/2022    Enterococcus 2/2022 treated  Retention as above with reflux     Kidney Stones 10/7/2015    Treatment in 2015 and 2016, was on K citra in past  2mm left stone on CTU 2/2022     Nephrolithiasis (Resolved) 9/14/2016   Lesion of Bladder (Resolved) 9/14/2016       Plan:     1.  Discussed urodynamics findings.  Discussed options including continue current chronic Chino catheter.  Discussed placing suprapubic tube to facilitate possible sacral neuromodulation in the future.  2.  Risks, benefits and alternatives of each discussed  3.  Want to continue chino for now  4.  Follow up in 3 weeks for exchange      Ashu Riley MD

## 2022-05-02 ENCOUNTER — TELEPHONE (OUTPATIENT)
Dept: UROLOGY | Facility: CLINIC | Age: 72
End: 2022-05-02
Payer: MEDICARE

## 2022-05-02 NOTE — TELEPHONE ENCOUNTER
Returned call, patient stated catheter bag keeps coming loose.  She cannot live like this.  She asked what could be causing this. Informed I didn't know as I don't have a visual on catheter.  Offered a nurse visit to change bag.  Informed if visit needed, will convert over to Dr Riley schedule.  Primary MD looked at catheter and said it was fine.  Urine draining into catheter bag, no suprapubic pressure.  Patient coming in tomorrow at 1000 hours.

## 2022-05-02 NOTE — TELEPHONE ENCOUNTER
----- Message from Lucretia Escobar, Patient Care Assistant sent at 5/2/2022  1:12 PM CDT -----  Type:  Needs Medical Advice    Who Called:  pt  Symptoms (please be specific):  Patient would like a call back to discuss the problem she is having with the cathter, the cathter is coming apart    Would the patient rather a call back or a response via [facility]?  Please call  Best Call Back Number:  [phone]   Additional Information:

## 2022-05-03 ENCOUNTER — CLINICAL SUPPORT (OUTPATIENT)
Dept: UROLOGY | Facility: CLINIC | Age: 72
End: 2022-05-03
Payer: MEDICARE

## 2022-05-03 VITALS
SYSTOLIC BLOOD PRESSURE: 117 MMHG | DIASTOLIC BLOOD PRESSURE: 57 MMHG | WEIGHT: 199.94 LBS | BODY MASS INDEX: 37.75 KG/M2 | HEART RATE: 59 BPM | HEIGHT: 61 IN

## 2022-05-03 PROCEDURE — 99999 PR PBB SHADOW E&M-EST. PATIENT-LVL III: CPT | Mod: PBBFAC,,,

## 2022-05-03 PROCEDURE — 99999 PR PBB SHADOW E&M-EST. PATIENT-LVL III: ICD-10-PCS | Mod: PBBFAC,,,

## 2022-05-12 ENCOUNTER — OFFICE VISIT (OUTPATIENT)
Dept: UROLOGY | Facility: CLINIC | Age: 72
End: 2022-05-12
Payer: MEDICARE

## 2022-05-12 VITALS
WEIGHT: 199.94 LBS | SYSTOLIC BLOOD PRESSURE: 135 MMHG | DIASTOLIC BLOOD PRESSURE: 67 MMHG | BODY MASS INDEX: 37.75 KG/M2 | HEIGHT: 61 IN | HEART RATE: 61 BPM

## 2022-05-12 DIAGNOSIS — R33.9 URINARY RETENTION: Primary | ICD-10-CM

## 2022-05-12 PROCEDURE — 3288F PR FALLS RISK ASSESSMENT DOCUMENTED: ICD-10-PCS | Mod: CPTII,S$GLB,, | Performed by: UROLOGY

## 2022-05-12 PROCEDURE — 3075F SYST BP GE 130 - 139MM HG: CPT | Mod: CPTII,S$GLB,, | Performed by: UROLOGY

## 2022-05-12 PROCEDURE — 1126F AMNT PAIN NOTED NONE PRSNT: CPT | Mod: CPTII,S$GLB,, | Performed by: UROLOGY

## 2022-05-12 PROCEDURE — 4010F ACE/ARB THERAPY RXD/TAKEN: CPT | Mod: CPTII,S$GLB,, | Performed by: UROLOGY

## 2022-05-12 PROCEDURE — 1160F RVW MEDS BY RX/DR IN RCRD: CPT | Mod: CPTII,S$GLB,, | Performed by: UROLOGY

## 2022-05-12 PROCEDURE — 99999 PR PBB SHADOW E&M-EST. PATIENT-LVL IV: CPT | Mod: PBBFAC,,, | Performed by: UROLOGY

## 2022-05-12 PROCEDURE — 1101F PR PT FALLS ASSESS DOC 0-1 FALLS W/OUT INJ PAST YR: ICD-10-PCS | Mod: CPTII,S$GLB,, | Performed by: UROLOGY

## 2022-05-12 PROCEDURE — 3288F FALL RISK ASSESSMENT DOCD: CPT | Mod: CPTII,S$GLB,, | Performed by: UROLOGY

## 2022-05-12 PROCEDURE — 1160F PR REVIEW ALL MEDS BY PRESCRIBER/CLIN PHARMACIST DOCUMENTED: ICD-10-PCS | Mod: CPTII,S$GLB,, | Performed by: UROLOGY

## 2022-05-12 PROCEDURE — 99213 PR OFFICE/OUTPT VISIT, EST, LEVL III, 20-29 MIN: ICD-10-PCS | Mod: S$GLB,,, | Performed by: UROLOGY

## 2022-05-12 PROCEDURE — 3078F DIAST BP <80 MM HG: CPT | Mod: CPTII,S$GLB,, | Performed by: UROLOGY

## 2022-05-12 PROCEDURE — 3078F PR MOST RECENT DIASTOLIC BLOOD PRESSURE < 80 MM HG: ICD-10-PCS | Mod: CPTII,S$GLB,, | Performed by: UROLOGY

## 2022-05-12 PROCEDURE — 3008F PR BODY MASS INDEX (BMI) DOCUMENTED: ICD-10-PCS | Mod: CPTII,S$GLB,, | Performed by: UROLOGY

## 2022-05-12 PROCEDURE — 99213 OFFICE O/P EST LOW 20 MIN: CPT | Mod: S$GLB,,, | Performed by: UROLOGY

## 2022-05-12 PROCEDURE — 1159F PR MEDICATION LIST DOCUMENTED IN MEDICAL RECORD: ICD-10-PCS | Mod: CPTII,S$GLB,, | Performed by: UROLOGY

## 2022-05-12 PROCEDURE — 4010F PR ACE/ARB THEARPY RXD/TAKEN: ICD-10-PCS | Mod: CPTII,S$GLB,, | Performed by: UROLOGY

## 2022-05-12 PROCEDURE — 3008F BODY MASS INDEX DOCD: CPT | Mod: CPTII,S$GLB,, | Performed by: UROLOGY

## 2022-05-12 PROCEDURE — 99999 PR PBB SHADOW E&M-EST. PATIENT-LVL IV: ICD-10-PCS | Mod: PBBFAC,,, | Performed by: UROLOGY

## 2022-05-12 PROCEDURE — 3075F PR MOST RECENT SYSTOLIC BLOOD PRESS GE 130-139MM HG: ICD-10-PCS | Mod: CPTII,S$GLB,, | Performed by: UROLOGY

## 2022-05-12 PROCEDURE — 1101F PT FALLS ASSESS-DOCD LE1/YR: CPT | Mod: CPTII,S$GLB,, | Performed by: UROLOGY

## 2022-05-12 PROCEDURE — 1159F MED LIST DOCD IN RCRD: CPT | Mod: CPTII,S$GLB,, | Performed by: UROLOGY

## 2022-05-12 PROCEDURE — 1126F PR PAIN SEVERITY QUANTIFIED, NO PAIN PRESENT: ICD-10-PCS | Mod: CPTII,S$GLB,, | Performed by: UROLOGY

## 2022-05-12 NOTE — PROGRESS NOTES
Rodriguez exchange.  Under sterile technique, 16 Fr catheter inserted into urinary bladder with clear, straw colored urine output.  Inflated with 10 cc NS.  Connected to leg bag at patient's request.  She provided strap to use, stat-lock placed on right thigh.  Removed strap that initially came with leg bag as strap too small.  Did not alter bottom strap of leg bag as its loose fitting around leg.  Nurse visit scheduled.  Patient to follow up with Dr Riley in 3 months.  Patient teaching provided.  If stat-lock does not last one month-patient to purchase from [facility] if needed.  No further questions.

## 2022-05-12 NOTE — PROGRESS NOTES
Subjective:       Patient ID: Socorro Lucas is a 71 y.o. female.    Chief Complaint: Follow-up (3 wk f/u chino exchange)     This is a 71 y.o.  female patient that is an established patient. The patient was referred to me by PCP for multiple issues: recurrent UTI, dysuria, hematuria, h/o stones.  H/o recurrent UTIs, no cultures available.  States currently with pain to lower abdomen.  No flank pain.  Recently had hematuria with UTI per report, dark red urine with small clots, past smoker.  Reports UTI every 1-2 months and treated with antibiotic by PCP.  Symptoms: abd pain, frequency, urgency, gross hematuria.  H/o stones in 2015 and 2016 requiring intervention,  Started K citrate at the time.      PVR today 324.  Myrbetriq started recently by PCP, also on tamsulosin.     3/8/22: follow up CT urogram, enterococcus UTI treated, states feels better, voiding improved.   today   4/22/22: follow up after urodynamics, catheter discomfort improving   5/12/22: cath change.     Lab Results   Component Value Date    CREATININE 1.3 02/14/2022       ---  Past Medical History:   Diagnosis Date    COPD (chronic obstructive pulmonary disease)     DVT (deep venous thrombosis) left leg post cholecystectomy    Hypertension     Hypothyroidism     Kidney stone     Thyroid disease     Urinary tract infection     Vaginal infection        Past Surgical History:   Procedure Laterality Date    APPENDECTOMY      BREAST SURGERY      biopsy only x 6    CARPAL TUNNEL RELEASE Bilateral     CHOLECYSTECTOMY      CYSTOSCOPY      CYSTOSCOPY WITH CYSTOGRAPHY N/A 4/4/2022    Procedure: CYSTOSCOPY, WITH CYSTOGRAM;  Surgeon: Ashu Riley MD;  Location: [facility] OR;  Service: Urology;  Laterality: N/A;    CYSTOSCOPY WITH URODYNAMIC TESTING N/A 4/18/2022    Procedure: CYSTOSCOPY, WITH URODYNAMIC TESTING FLOUROSCOPIC;  Surgeon: Trae Em MD;  Location: [facility] OR 1ST FLR;  Service: Urology;  Laterality: N/A;  1hr     CHARI FILTER PLACEMENT      INSERTION OF URETERAL CATHETER N/A 2022    Procedure: INSERTION, CATHETER, URETER;  Surgeon: Ashu Riley MD;  Location: [facility] OR;  Service: Urology;  Laterality: N/A;    INSERTION OF URETERAL CATHETER  2022    Procedure: ;  Surgeon: Ashu Riley MD;  Location: [facility] OR;  Service: Urology;;    RETROGRADE PYELOGRAPHY Bilateral 2022    Procedure: PYELOGRAM, RETROGRADE;  Surgeon: Ashu Riley MD;  Location: [facility] OR;  Service: Urology;  Laterality: Bilateral;    URETEROSCOPY Left 2022    Procedure: URETEROSCOPY;  Surgeon: Ashu Riley MD;  Location: [facility] OR;  Service: Urology;  Laterality: Left;       Family History   Problem Relation Age of Onset    No Known Problems Mother     No Known Problems Father     Kidney disease Neg Hx        Social History     Tobacco Use    Smoking status: Former Smoker     Quit date: 1996     Years since quittin.3    Smokeless tobacco: Never Used   Substance Use Topics    Alcohol use: No     Alcohol/week: 0.0 standard drinks    Drug use: No       Current Outpatient Medications on File Prior to Visit   Medication Sig Dispense Refill    acetaminophen (TYLENOL) 500 MG tablet Take 500 mg by mouth every 6 (six) hours as needed for Pain.      albuterol (PROVENTIL/VENTOLIN HFA) 90 mcg/actuation inhaler Inhale 2 puffs into the lungs every 6 (six) hours as needed for Wheezing.      beta-carotene,A,-vits C,E/mins (OCUVITE ORAL) Take 1 tablet by mouth once daily.      diphenoxylate-atropine 2.5-0.025 mg (LOMOTIL) 2.5-0.025 mg per tablet       ergocalciferol, vitamin D2, (VITAMIN D ORAL) Take 25 mcg by mouth once daily.      folic acid (FOLVITE) 800 MCG Tab Take 800 mcg by mouth once daily.      furosemide (LASIX) 40 MG tablet Take 40 mg by mouth as needed.      gabapentin (NEURONTIN) 100 MG capsule Take 100 mg by mouth 2 (two) times daily.      hydrochlorothiazide (HYDRODIURIL) 25 MG tablet Take  25 mg by mouth once daily.      levothyroxine (SYNTHROID) 125 MCG tablet Take 125 mcg by mouth before breakfast.      lisinopriL (PRINIVIL,ZESTRIL) 40 MG tablet Take 40 mg by mouth once daily.      metoprolol succinate (TOPROL-XL) 25 MG 24 hr tablet Take 25 mg by mouth once daily.      multivit with calcium,iron,min (WOMEN'S DAILY FORMULA ORAL) Take 1 tablet by mouth once daily.      potassium chloride (MICRO-K) 10 MEQ CpSR       promethazine (PHENERGAN) 25 MG tablet Take 1 tablet (25 mg total) by mouth every 4 (four) hours. 20 tablet 0    tamsulosin (FLOMAX) 0.4 mg Cap Take 0.4 mg by mouth once daily.      tetracycline (ACHROMYCIN,SUMYCIN) 500 MG capsule Take 1 capsule (500 mg total) by mouth 2 (two) times daily. 20 capsule 0    tiotropium (SPIRIVA) 18 mcg inhalation capsule Inhale 18 mcg into the lungs once daily.      tolterodine (DETROL) 2 MG Tab       TRELEGY ELLIPTA 200-62.5-25 mcg inhaler        Current Facility-Administered Medications on File Prior to Visit   Medication Dose Route Frequency Provider Last Rate Last Admin    acetaminophen tablet 1,000 mg  1,000 mg Oral On Call Procedure Ashu Riley MD           Review of patient's allergies indicates:   Allergen Reactions    Macrobid [nitrofurantoin monohyd/m-cryst] Swelling    Sulfa (sulfonamide antibiotics) Anaphylaxis    Asa [aspirin] Other (See Comments)    Codeine Nausea Only       Review of Systems   Constitutional: Positive for activity change and appetite change. Negative for chills and fever.   Respiratory: Negative for cough, chest tightness and shortness of breath.    Cardiovascular: Negative for chest pain.   Gastrointestinal: Positive for abdominal pain. Negative for abdominal distention, nausea and vomiting.   Genitourinary: Positive for difficulty urinating, dysuria, frequency, hematuria, pelvic pain and urgency. Negative for flank pain.   Neurological: Negative for dizziness.   Hematological: Does not bruise/bleed  easily.   Psychiatric/Behavioral: Negative for agitation.       Objective:      Physical Exam  HENT:      Head: Normocephalic.   Cardiovascular:      Pulses: Normal pulses.   Pulmonary:      Effort: Pulmonary effort is normal.   Abdominal:      General: Abdomen is flat. There is no distension.      Palpations: Abdomen is soft.      Tenderness: There is no abdominal tenderness. There is no right CVA tenderness, left CVA tenderness or guarding.   Musculoskeletal:      Cervical back: Normal range of motion.   Skin:     General: Skin is warm.   Neurological:      General: No focal deficit present.      Mental Status: She is alert.           Results for orders placed or performed during the hospital encounter of 02/16/22 (from the past 2160 hour(s))   CT Urogram Abd Pelvis W WO    Impression    Abnormal appearance of the urinary bladder suggesting chronic inflammation and or outlet obstruction    Bilateral hydroureter and mild right-sided hydronephrosis possibly secondary to lower urinary tract abnormalities.  Questionable filling defect versus mixing artifact within the distal left ureter.  Consider direct visualization as clinically felt appropriate.    All CT scans at this facility use dose modulation, iterative reconstruction, and/or weight based dosing when appropriate to reduce radiation dose to as low as reasonable achievable.      Electronically signed by: Lavon Hoskins Jr  Date:    02/16/2022  Time:    12:39     UDS:      No void, normal compliance bilateral grade 1 reflux    Assessment:     Problem Noted   Gross Hematuria 2/14/2022    With recent UTI per patient no culture available, past smoker.   --CTU 2/22: bilateral hydroureter, abrupt contrast cut off on left, distended bladder/trabeculation  --4/4/22 cysto, bilateral RPGs, left URS, cystogram--findings no left lesions, chronic cystitis, bilateral reflux w/o significant hydronephrosis     Urinary Retention 4/12/2022    , 280 and on cystoscopy,  has BL reflux   Cath placed 4/4/22  UDS 4/22:  No void, normal compliance bilateral grade 1 reflux     Recurrent Uti 2/14/2022    Enterococcus 2/2022 treated  Retention as above with reflux     Kidney Stones 10/7/2015    Treatment in 2015 and 2016, was on K citra in past  2mm left stone on CTU 2/2022     Nephrolithiasis (Resolved) 9/14/2016   Lesion of Bladder (Resolved) 9/14/2016       Plan:     1.  Discussed options again: SPT, SPT followed by possible SNM, chronic chino.  Patient unable to do CIC  2.  Decided on chronic chino, Nurse Mcdonald to change 16F chino today and follow up in 30 days for cath change.      Ashu Riley MD

## 2022-06-10 ENCOUNTER — CLINICAL SUPPORT (OUTPATIENT)
Dept: UROLOGY | Facility: CLINIC | Age: 72
End: 2022-06-10
Payer: MEDICARE

## 2022-06-10 VITALS — TEMPERATURE: 98 F

## 2022-06-10 PROCEDURE — 99999 PR PBB SHADOW E&M-EST. PATIENT-LVL III: CPT | Mod: PBBFAC,,,

## 2022-06-10 PROCEDURE — 99999 PR PBB SHADOW E&M-EST. PATIENT-LVL III: ICD-10-PCS | Mod: PBBFAC,,,

## 2022-06-10 NOTE — PROGRESS NOTES
Chino exchanged.  Under sterile technique, 16 fr chino inserted into urinary bladder with clear, yellow urine output. Balloon inflated with 10 CC NS.  Catheter connected to leg bag as patient requests and secured to leg strap provided by patient. Stat-lock given in case she needs it.  Patient teaching provided.  Next chino exchange in one month.

## 2022-07-08 ENCOUNTER — CLINICAL SUPPORT (OUTPATIENT)
Dept: UROLOGY | Facility: CLINIC | Age: 72
End: 2022-07-08
Payer: MEDICARE

## 2022-07-08 VITALS
DIASTOLIC BLOOD PRESSURE: 79 MMHG | WEIGHT: 199.94 LBS | HEIGHT: 61 IN | SYSTOLIC BLOOD PRESSURE: 139 MMHG | HEART RATE: 67 BPM | BODY MASS INDEX: 37.75 KG/M2

## 2022-07-08 PROCEDURE — 99999 PR PBB SHADOW E&M-EST. PATIENT-LVL III: CPT | Mod: PBBFAC,,,

## 2022-07-08 PROCEDURE — 99999 PR PBB SHADOW E&M-EST. PATIENT-LVL III: ICD-10-PCS | Mod: PBBFAC,,,

## 2022-08-02 DIAGNOSIS — M81.0 OP (OSTEOPOROSIS): Primary | ICD-10-CM

## 2022-08-02 DIAGNOSIS — R05.9 COUGH: ICD-10-CM

## 2022-08-09 ENCOUNTER — HOSPITAL ENCOUNTER (OUTPATIENT)
Dept: RADIOLOGY | Facility: HOSPITAL | Age: 72
Discharge: HOME | End: 2022-08-09
Attending: FAMILY MEDICINE
Payer: MEDICARE

## 2022-08-09 ENCOUNTER — OFFICE VISIT (OUTPATIENT)
Dept: UROLOGY | Facility: CLINIC | Age: 72
End: 2022-08-09
Payer: MEDICARE

## 2022-08-09 VITALS
DIASTOLIC BLOOD PRESSURE: 71 MMHG | HEART RATE: 57 BPM | SYSTOLIC BLOOD PRESSURE: 116 MMHG | BODY MASS INDEX: 37.78 KG/M2 | HEIGHT: 61 IN

## 2022-08-09 DIAGNOSIS — R33.9 URINARY RETENTION: Primary | ICD-10-CM

## 2022-08-09 DIAGNOSIS — M81.0 OP (OSTEOPOROSIS): ICD-10-CM

## 2022-08-09 DIAGNOSIS — R05.9 COUGH: ICD-10-CM

## 2022-08-09 PROCEDURE — 99213 OFFICE O/P EST LOW 20 MIN: CPT | Mod: S$GLB,,, | Performed by: UROLOGY

## 2022-08-09 PROCEDURE — 3288F FALL RISK ASSESSMENT DOCD: CPT | Mod: CPTII,S$GLB,, | Performed by: UROLOGY

## 2022-08-09 PROCEDURE — 1159F PR MEDICATION LIST DOCUMENTED IN MEDICAL RECORD: ICD-10-PCS | Mod: CPTII,S$GLB,, | Performed by: UROLOGY

## 2022-08-09 PROCEDURE — 3074F SYST BP LT 130 MM HG: CPT | Mod: CPTII,S$GLB,, | Performed by: UROLOGY

## 2022-08-09 PROCEDURE — 71046 X-RAY EXAM CHEST 2 VIEWS: CPT | Mod: TC,FY

## 2022-08-09 PROCEDURE — 71046 XR CHEST PA AND LATERAL: ICD-10-PCS | Mod: 26,,, | Performed by: RADIOLOGY

## 2022-08-09 PROCEDURE — 71046 X-RAY EXAM CHEST 2 VIEWS: CPT | Mod: 26,,, | Performed by: RADIOLOGY

## 2022-08-09 PROCEDURE — 77080 DXA BONE DENSITY AXIAL: CPT | Mod: TC

## 2022-08-09 PROCEDURE — 99999 PR PBB SHADOW E&M-EST. PATIENT-LVL IV: ICD-10-PCS | Mod: PBBFAC,,, | Performed by: UROLOGY

## 2022-08-09 PROCEDURE — 1126F PR PAIN SEVERITY QUANTIFIED, NO PAIN PRESENT: ICD-10-PCS | Mod: CPTII,S$GLB,, | Performed by: UROLOGY

## 2022-08-09 PROCEDURE — 1101F PT FALLS ASSESS-DOCD LE1/YR: CPT | Mod: CPTII,S$GLB,, | Performed by: UROLOGY

## 2022-08-09 PROCEDURE — 1160F PR REVIEW ALL MEDS BY PRESCRIBER/CLIN PHARMACIST DOCUMENTED: ICD-10-PCS | Mod: CPTII,S$GLB,, | Performed by: UROLOGY

## 2022-08-09 PROCEDURE — 3288F PR FALLS RISK ASSESSMENT DOCUMENTED: ICD-10-PCS | Mod: CPTII,S$GLB,, | Performed by: UROLOGY

## 2022-08-09 PROCEDURE — 99213 PR OFFICE/OUTPT VISIT, EST, LEVL III, 20-29 MIN: ICD-10-PCS | Mod: S$GLB,,, | Performed by: UROLOGY

## 2022-08-09 PROCEDURE — 3008F BODY MASS INDEX DOCD: CPT | Mod: CPTII,S$GLB,, | Performed by: UROLOGY

## 2022-08-09 PROCEDURE — 3078F PR MOST RECENT DIASTOLIC BLOOD PRESSURE < 80 MM HG: ICD-10-PCS | Mod: CPTII,S$GLB,, | Performed by: UROLOGY

## 2022-08-09 PROCEDURE — 3074F PR MOST RECENT SYSTOLIC BLOOD PRESSURE < 130 MM HG: ICD-10-PCS | Mod: CPTII,S$GLB,, | Performed by: UROLOGY

## 2022-08-09 PROCEDURE — 1160F RVW MEDS BY RX/DR IN RCRD: CPT | Mod: CPTII,S$GLB,, | Performed by: UROLOGY

## 2022-08-09 PROCEDURE — 1126F AMNT PAIN NOTED NONE PRSNT: CPT | Mod: CPTII,S$GLB,, | Performed by: UROLOGY

## 2022-08-09 PROCEDURE — 3008F PR BODY MASS INDEX (BMI) DOCUMENTED: ICD-10-PCS | Mod: CPTII,S$GLB,, | Performed by: UROLOGY

## 2022-08-09 PROCEDURE — 77080 DEXA BONE DENSITY SPINE HIP: ICD-10-PCS | Mod: 26,,, | Performed by: RADIOLOGY

## 2022-08-09 PROCEDURE — 3078F DIAST BP <80 MM HG: CPT | Mod: CPTII,S$GLB,, | Performed by: UROLOGY

## 2022-08-09 PROCEDURE — 1101F PR PT FALLS ASSESS DOC 0-1 FALLS W/OUT INJ PAST YR: ICD-10-PCS | Mod: CPTII,S$GLB,, | Performed by: UROLOGY

## 2022-08-09 PROCEDURE — 4010F ACE/ARB THERAPY RXD/TAKEN: CPT | Mod: CPTII,S$GLB,, | Performed by: UROLOGY

## 2022-08-09 PROCEDURE — 4010F PR ACE/ARB THEARPY RXD/TAKEN: ICD-10-PCS | Mod: CPTII,S$GLB,, | Performed by: UROLOGY

## 2022-08-09 PROCEDURE — 99999 PR PBB SHADOW E&M-EST. PATIENT-LVL IV: CPT | Mod: PBBFAC,,, | Performed by: UROLOGY

## 2022-08-09 PROCEDURE — 77080 DXA BONE DENSITY AXIAL: CPT | Mod: 26,,, | Performed by: RADIOLOGY

## 2022-08-09 PROCEDURE — 1159F MED LIST DOCD IN RCRD: CPT | Mod: CPTII,S$GLB,, | Performed by: UROLOGY

## 2022-08-09 RX ORDER — CLOTRIMAZOLE AND BETAMETHASONE DIPROPIONATE 10; .64 MG/G; MG/G
CREAM TOPICAL 2 TIMES DAILY
COMMUNITY
Start: 2022-06-02

## 2022-08-09 RX ORDER — MUPIROCIN 20 MG/G
OINTMENT TOPICAL
COMMUNITY
Start: 2022-08-02 | End: 2023-04-05

## 2022-08-09 RX ORDER — NYSTATIN AND TRIAMCINOLONE ACETONIDE 100000; 1 [USP'U]/G; MG/G
OINTMENT TOPICAL
COMMUNITY
Start: 2022-08-03

## 2022-08-09 NOTE — PROGRESS NOTES
Subjective:       Patient ID: Socorro Lucas is a 71 y.o. female.    Chief Complaint: No chief complaint on file.     This is a 71 y.o.  female patient that is an established patient. The patient was referred to me by PCP for multiple issues: recurrent UTI, dysuria, hematuria, h/o stones.  H/o recurrent UTIs, no cultures available.  States currently with pain to lower abdomen.  No flank pain.  Recently had hematuria with UTI per report, dark red urine with small clots, past smoker.  Reports UTI every 1-2 months and treated with antibiotic by PCP.  Symptoms: abd pain, frequency, urgency, gross hematuria.  H/o stones in 2015 and 2016 requiring intervention,  Started K citrate at the time.       PVR today 324.  Myrbetriq started recently by PCP, also on tamsulosin.     3/8/22: follow up CT urogram, enterococcus UTI treated, states feels better, voiding improved.   today   4/22/22: follow up after urodynamics, catheter discomfort improving   8/9/22: cath change.     Lab Results   Component Value Date    CREATININE 1.3 02/14/2022       ---  Past Medical History:   Diagnosis Date    COPD (chronic obstructive pulmonary disease)     DVT (deep venous thrombosis) left leg post cholecystectomy    Hypertension     Hypothyroidism     Kidney stone     Thyroid disease     Urinary tract infection     Vaginal infection        Past Surgical History:   Procedure Laterality Date    APPENDECTOMY      BREAST SURGERY      biopsy only x 6    CARPAL TUNNEL RELEASE Bilateral     CHOLECYSTECTOMY      CYSTOSCOPY      CYSTOSCOPY WITH CYSTOGRAPHY N/A 4/4/2022    Procedure: CYSTOSCOPY, WITH CYSTOGRAM;  Surgeon: Ashu Riley MD;  Location: [facility] OR;  Service: Urology;  Laterality: N/A;    CYSTOSCOPY WITH URODYNAMIC TESTING N/A 4/18/2022    Procedure: CYSTOSCOPY, WITH URODYNAMIC TESTING FLOUROSCOPIC;  Surgeon: Trae Em MD;  Location: [facility] OR [facility] FLR;  Service: Urology;  Laterality: N/A;  1hr     CHARI FILTER PLACEMENT      INSERTION OF URETERAL CATHETER N/A 2022    Procedure: INSERTION, CATHETER, URETER;  Surgeon: Ashu Riley MD;  Location: [facility] OR;  Service: Urology;  Laterality: N/A;    INSERTION OF URETERAL CATHETER  2022    Procedure: ;  Surgeon: Ashu Riley MD;  Location: [facility] OR;  Service: Urology;;    RETROGRADE PYELOGRAPHY Bilateral 2022    Procedure: PYELOGRAM, RETROGRADE;  Surgeon: Ashu Riley MD;  Location: [facility] OR;  Service: Urology;  Laterality: Bilateral;    URETEROSCOPY Left 2022    Procedure: URETEROSCOPY;  Surgeon: Ashu Riley MD;  Location: [facility] OR;  Service: Urology;  Laterality: Left;       Family History   Problem Relation Age of Onset    No Known Problems Mother     No Known Problems Father     Kidney disease Neg Hx        Social History     Tobacco Use    Smoking status: Former Smoker     Quit date: 1996     Years since quittin.6    Smokeless tobacco: Never Used   Substance Use Topics    Alcohol use: No     Alcohol/week: 0.0 standard drinks    Drug use: No       Current Outpatient Medications on File Prior to Visit   Medication Sig Dispense Refill    acetaminophen (TYLENOL) 500 MG tablet Take 500 mg by mouth every 6 (six) hours as needed for Pain.      albuterol (PROVENTIL/VENTOLIN HFA) 90 mcg/actuation inhaler Inhale 2 puffs into the lungs every 6 (six) hours as needed for Wheezing.      beta-carotene,A,-vits C,E/mins (OCUVITE ORAL) Take 1 tablet by mouth once daily.      diphenoxylate-atropine 2.5-0.025 mg (LOMOTIL) 2.5-0.025 mg per tablet       ergocalciferol, vitamin D2, (VITAMIN D ORAL) Take 25 mcg by mouth once daily.      folic acid (FOLVITE) 800 MCG Tab Take 800 mcg by mouth once daily.      furosemide (LASIX) 40 MG tablet Take 40 mg by mouth as needed.      gabapentin (NEURONTIN) 100 MG capsule Take 100 mg by mouth 2 (two) times daily.      hydrochlorothiazide (HYDRODIURIL) 25 MG tablet Take 25  mg by mouth once daily.      levothyroxine (SYNTHROID) 125 MCG tablet Take 125 mcg by mouth before breakfast.      lisinopriL (PRINIVIL,ZESTRIL) 40 MG tablet Take 40 mg by mouth once daily.      metoprolol succinate (TOPROL-XL) 25 MG 24 hr tablet Take 25 mg by mouth once daily.      multivit with calcium,iron,min (WOMEN'S DAILY FORMULA ORAL) Take 1 tablet by mouth once daily.      potassium chloride (MICRO-K) 10 MEQ CpSR       promethazine (PHENERGAN) 25 MG tablet Take 1 tablet (25 mg total) by mouth every 4 (four) hours. 20 tablet 0    tamsulosin (FLOMAX) 0.4 mg Cap Take 0.4 mg by mouth once daily.      tetracycline (ACHROMYCIN,SUMYCIN) 500 MG capsule Take 1 capsule (500 mg total) by mouth 2 (two) times daily. 20 capsule 0    tiotropium (SPIRIVA) 18 mcg inhalation capsule Inhale 18 mcg into the lungs once daily.      tolterodine (DETROL) 2 MG Tab       TRELEGY ELLIPTA 200-62.5-25 mcg inhaler        Current Facility-Administered Medications on File Prior to Visit   Medication Dose Route Frequency Provider Last Rate Last Admin    acetaminophen tablet 1,000 mg  1,000 mg Oral On Call Procedure Ashu Riley MD           Review of patient's allergies indicates:   Allergen Reactions    Macrobid [nitrofurantoin monohyd/m-cryst] Swelling    Sulfa (sulfonamide antibiotics) Anaphylaxis    Asa [aspirin] Other (See Comments)    Codeine Nausea Only       Review of Systems   Constitutional: Positive for activity change and appetite change. Negative for chills and fever.   Respiratory: Negative for cough, chest tightness and shortness of breath.    Cardiovascular: Negative for chest pain.   Gastrointestinal: Positive for abdominal pain. Negative for abdominal distention, nausea and vomiting.   Genitourinary: Positive for difficulty urinating, dysuria, frequency, hematuria, pelvic pain and urgency. Negative for flank pain.   Neurological: Negative for dizziness.   Hematological: Does not bruise/bleed easily.    Psychiatric/Behavioral: Negative for agitation.       Objective:      Physical Exam  HENT:      Head: Normocephalic.   Cardiovascular:      Pulses: Normal pulses.   Pulmonary:      Effort: Pulmonary effort is normal.   Abdominal:      General: Abdomen is flat. There is no distension.      Palpations: Abdomen is soft.      Tenderness: There is no abdominal tenderness. There is no right CVA tenderness, left CVA tenderness or guarding.   Musculoskeletal:      Cervical back: Normal range of motion.   Skin:     General: Skin is warm.   Neurological:      General: No focal deficit present.      Mental Status: She is alert.           No results found for this or any previous visit (from the past 2160 hour(s)).  UDS:       No void, normal compliance bilateral grade 1 reflux    Assessment:     Problem Noted   Gross Hematuria 2/14/2022    With recent UTI per patient no culture available, past smoker.   --CTU 2/22: bilateral hydroureter, abrupt contrast cut off on left, distended bladder/trabeculation  --4/4/22 cysto, bilateral RPGs, left URS, cystogram--findings no left lesions, chronic cystitis, bilateral reflux w/o significant hydronephrosis     Urinary Retention 4/12/2022    , 280 and on cystoscopy, has BL reflux   Cath placed 4/4/22  UDS 4/22:  No void, normal compliance bilateral grade 1 reflux     Recurrent Uti 2/14/2022    Enterococcus 2/2022 treated  Retention as above with reflux     Kidney Stones 10/7/2015    Treatment in 2015 and 2016, was on K citra in past  2mm left stone on CTU 2/2022     Nephrolithiasis (Resolved) 9/14/2016   Lesion of Bladder (Resolved) 9/14/2016       Plan:     1.  Discussed options again: SPT, SPT followed by possible SNM, chronic chino.  Patient unable to do CIC  2.  Wishes to continue with chino for now  3.  Nurse Becca to exchange 16F chino      Ashu Riley MD

## 2022-08-09 NOTE — PROGRESS NOTES
Rodriguez exchange.  Allergies reviewed.  Under sterile technique, 16 Fr inserted into urinary bladder with yellow urine and scan blood.  Balloon inflated with 10 cc of NS.  Connected to leg bag at patient's request.  Patient has strap connected to right thigh. Patient tolerated well.

## 2022-08-17 ENCOUNTER — OFFICE VISIT (OUTPATIENT)
Dept: CARDIOLOGY | Facility: CLINIC | Age: 72
End: 2022-08-17
Payer: MEDICARE

## 2022-08-17 VITALS
SYSTOLIC BLOOD PRESSURE: 115 MMHG | WEIGHT: 209.69 LBS | HEART RATE: 72 BPM | BODY MASS INDEX: 39.61 KG/M2 | OXYGEN SATURATION: 94 % | DIASTOLIC BLOOD PRESSURE: 49 MMHG

## 2022-08-17 DIAGNOSIS — M79.89 LEG SWELLING: ICD-10-CM

## 2022-08-17 DIAGNOSIS — R60.9 EDEMA, UNSPECIFIED TYPE: Primary | ICD-10-CM

## 2022-08-17 DIAGNOSIS — Z95.5 HISTORY OF CORONARY ANGIOPLASTY WITH INSERTION OF STENT: ICD-10-CM

## 2022-08-17 DIAGNOSIS — E66.09 CLASS 2 OBESITY DUE TO EXCESS CALORIES WITHOUT SERIOUS COMORBIDITY WITH BODY MASS INDEX (BMI) OF 39.0 TO 39.9 IN ADULT: ICD-10-CM

## 2022-08-17 DIAGNOSIS — I70.0 AORTIC ATHEROSCLEROSIS: ICD-10-CM

## 2022-08-17 DIAGNOSIS — I10 ESSENTIAL HYPERTENSION: ICD-10-CM

## 2022-08-17 PROCEDURE — 3078F PR MOST RECENT DIASTOLIC BLOOD PRESSURE < 80 MM HG: ICD-10-PCS | Mod: CPTII,S$GLB,, | Performed by: INTERNAL MEDICINE

## 2022-08-17 PROCEDURE — 4010F ACE/ARB THERAPY RXD/TAKEN: CPT | Mod: CPTII,S$GLB,, | Performed by: INTERNAL MEDICINE

## 2022-08-17 PROCEDURE — 99204 PR OFFICE/OUTPT VISIT, NEW, LEVL IV, 45-59 MIN: ICD-10-PCS | Mod: S$GLB,,, | Performed by: INTERNAL MEDICINE

## 2022-08-17 PROCEDURE — 99999 PR PBB SHADOW E&M-EST. PATIENT-LVL III: ICD-10-PCS | Mod: PBBFAC,,, | Performed by: INTERNAL MEDICINE

## 2022-08-17 PROCEDURE — 1159F MED LIST DOCD IN RCRD: CPT | Mod: CPTII,S$GLB,, | Performed by: INTERNAL MEDICINE

## 2022-08-17 PROCEDURE — 1160F RVW MEDS BY RX/DR IN RCRD: CPT | Mod: CPTII,S$GLB,, | Performed by: INTERNAL MEDICINE

## 2022-08-17 PROCEDURE — 1160F PR REVIEW ALL MEDS BY PRESCRIBER/CLIN PHARMACIST DOCUMENTED: ICD-10-PCS | Mod: CPTII,S$GLB,, | Performed by: INTERNAL MEDICINE

## 2022-08-17 PROCEDURE — 99204 OFFICE O/P NEW MOD 45 MIN: CPT | Mod: S$GLB,,, | Performed by: INTERNAL MEDICINE

## 2022-08-17 PROCEDURE — 3008F BODY MASS INDEX DOCD: CPT | Mod: CPTII,S$GLB,, | Performed by: INTERNAL MEDICINE

## 2022-08-17 PROCEDURE — 3074F SYST BP LT 130 MM HG: CPT | Mod: CPTII,S$GLB,, | Performed by: INTERNAL MEDICINE

## 2022-08-17 PROCEDURE — 3078F DIAST BP <80 MM HG: CPT | Mod: CPTII,S$GLB,, | Performed by: INTERNAL MEDICINE

## 2022-08-17 PROCEDURE — 99999 PR PBB SHADOW E&M-EST. PATIENT-LVL III: CPT | Mod: PBBFAC,,, | Performed by: INTERNAL MEDICINE

## 2022-08-17 PROCEDURE — 4010F PR ACE/ARB THEARPY RXD/TAKEN: ICD-10-PCS | Mod: CPTII,S$GLB,, | Performed by: INTERNAL MEDICINE

## 2022-08-17 PROCEDURE — 3008F PR BODY MASS INDEX (BMI) DOCUMENTED: ICD-10-PCS | Mod: CPTII,S$GLB,, | Performed by: INTERNAL MEDICINE

## 2022-08-17 PROCEDURE — 1126F AMNT PAIN NOTED NONE PRSNT: CPT | Mod: CPTII,S$GLB,, | Performed by: INTERNAL MEDICINE

## 2022-08-17 PROCEDURE — 1126F PR PAIN SEVERITY QUANTIFIED, NO PAIN PRESENT: ICD-10-PCS | Mod: CPTII,S$GLB,, | Performed by: INTERNAL MEDICINE

## 2022-08-17 PROCEDURE — 3074F PR MOST RECENT SYSTOLIC BLOOD PRESSURE < 130 MM HG: ICD-10-PCS | Mod: CPTII,S$GLB,, | Performed by: INTERNAL MEDICINE

## 2022-08-17 PROCEDURE — 1159F PR MEDICATION LIST DOCUMENTED IN MEDICAL RECORD: ICD-10-PCS | Mod: CPTII,S$GLB,, | Performed by: INTERNAL MEDICINE

## 2022-08-17 RX ORDER — FUROSEMIDE 40 MG/1
40 TABLET ORAL 2 TIMES DAILY
Qty: 180 TABLET | Refills: 4 | Status: ON HOLD
Start: 2022-08-17 | End: 2023-04-07

## 2022-08-17 NOTE — PROGRESS NOTES
[facility]     Subjective:    Patient ID:  Socorro Lucas is a 71 y.o. female who presents for evaluation of Shortness of Breath, Hypertension, and Leg Swelling    Review of patient's allergies indicates:   Allergen Reactions    Macrobid [nitrofurantoin monohyd/m-cryst] Swelling    Sulfa (sulfonamide antibiotics) Anaphylaxis    Asa [aspirin] Other (See Comments)    Codeine Nausea Only      She has kindly been referred for evaluation of shortness of breath.  She has longstanding hypertension.  In 2021 she had an echo confirming normal ejection fraction.  The echo was completed July 20, 2021 confirmed ejection fraction 60%.  The LV size was normal.  There was no report of valve disease.  Chest x-ray did show atherosclerotic disease of the aorta.  Her blood pressures are stable today.    She has the findings of venous insufficiency of her legs.  She states she barely drinks water.  She has been on furosemide 40 mg b.i.d. it was increased when she saw her physician last week.  She has redness of the skin for the past month.  She has water blebs.  There are no skin breaks.  Compression stockings have been recommended an attempted in the past.  She states that they do not fit.      She had recent labs.  Her albumin is normal.  She has GFR 51 range, serum creatinine 1.09 mg%.  Her LDL is well controlled 67 mg% pure hypercholesterolemia.    She states that years ago she had a blood clot in her leg left-sided.  She also describes a stent being placed in her heart but does not remember any details.  It was more than 15 years ago.  She states that it occurred after a surgery.  She states that there was no myocardial infarction.      Review of Systems   Constitutional: Positive for malaise/fatigue. Negative for chills, decreased appetite, diaphoresis, fever, night sweats, weight gain and weight loss.   HENT: Negative for congestion, ear  discharge, ear pain, hearing loss, hoarse voice, nosebleeds, odynophagia, sore throat, stridor and tinnitus.    Eyes: Negative for blurred vision, discharge, double vision, pain, photophobia, redness, vision loss in left eye, vision loss in right eye, visual disturbance and visual halos.   Cardiovascular: Positive for dyspnea on exertion and leg swelling. Negative for chest pain, claudication, cyanosis, irregular heartbeat, near-syncope, orthopnea, palpitations, paroxysmal nocturnal dyspnea and syncope.   Respiratory: Positive for shortness of breath. Negative for cough, hemoptysis, sleep disturbances due to breathing, snoring, sputum production and wheezing.    Endocrine: Negative for cold intolerance, heat intolerance, polydipsia, polyphagia and polyuria.   Hematologic/Lymphatic: Negative for adenopathy and bleeding problem. Does not bruise/bleed easily.   Skin: Negative for color change, dry skin, flushing, itching, nail changes, poor wound healing, rash, skin cancer, suspicious lesions and unusual hair distribution.   Musculoskeletal: Negative for arthritis, back pain, falls, gout, joint pain, joint swelling, muscle cramps, muscle weakness, myalgias, neck pain and stiffness.   Gastrointestinal: Negative for bloating, abdominal pain, anorexia, change in bowel habit, bowel incontinence, constipation, diarrhea, dysphagia, excessive appetite, flatus, heartburn, hematemesis, hematochezia, hemorrhoids, jaundice, melena, nausea and vomiting.   Genitourinary: Negative for bladder incontinence, decreased libido, dysuria, flank pain, frequency, genital sores, hematuria, hesitancy, incomplete emptying, nocturia and urgency.   Neurological: Positive for weakness. Negative for aphonia, brief paralysis, difficulty with concentration, disturbances in coordination, excessive daytime sleepiness, dizziness, focal weakness, headaches, light-headedness, loss of balance, numbness, paresthesias, seizures, sensory change, tremors and  vertigo.   Psychiatric/Behavioral: Negative for altered mental status, depression, hallucinations, memory loss, substance abuse, suicidal ideas and thoughts of violence. The patient does not have insomnia and is not nervous/anxious.    Allergic/Immunologic: Negative for hives and persistent infections.        Objective:       Vitals:    08/17/22 1600   BP: (!) 115/49   Pulse: 72   SpO2: (!) 94%   Weight: 95.1 kg (209 lb 10.5 oz)    Physical Exam  Constitutional:       General: She is not in acute distress.     Appearance: She is well-developed. She is not diaphoretic.   HENT:      Head: Normocephalic and atraumatic.      Nose: Nose normal.   Eyes:      General: No scleral icterus.        Right eye: No discharge.      Conjunctiva/sclera: Conjunctivae normal.      Pupils: Pupils are equal, round, and reactive to light.   Neck:      Thyroid: No thyromegaly.      Vascular: No JVD.      Trachea: No tracheal deviation.   Cardiovascular:      Rate and Rhythm: Normal rate and regular rhythm.      Pulses:           Carotid pulses are 2+ on the right side and 2+ on the left side.       Radial pulses are 2+ on the right side and 2+ on the left side.      Heart sounds: Normal heart sounds. No murmur heard.    No friction rub. No gallop.   Pulmonary:      Effort: Pulmonary effort is normal. No respiratory distress.      Breath sounds: Normal breath sounds. No stridor. No wheezing or rales.   Chest:      Chest wall: No tenderness.   Abdominal:      General: Bowel sounds are normal. There is no distension.      Palpations: Abdomen is soft. There is no mass.      Tenderness: There is no abdominal tenderness. There is no guarding or rebound.   Musculoskeletal:         General: No tenderness. Normal range of motion.      Cervical back: Normal range of motion and neck supple.      Right lower leg: Edema present.      Left lower leg: Edema present.   Lymphadenopathy:      Cervical: No cervical adenopathy.   Skin:     General: Skin is  warm and dry.      Coloration: Skin is not pale.      Findings: No erythema or rash.      Comments: Lower extremity thickening.  Stasis dermatitis like changes.  Multiple water blebs on both lower extremities noted.   Neurological:      Mental Status: She is alert and oriented to person, place, and time.      Cranial Nerves: No cranial nerve deficit.      Coordination: Coordination normal.   Psychiatric:         Behavior: Behavior normal.         Thought Content: Thought content normal.         Judgment: Judgment normal.           Assessment:       1. Edema, unspecified type    2. Leg swelling    3. History of coronary angioplasty with insertion of stent    4. Aortic atherosclerosis    5. Essential hypertension    6. Class 2 obesity due to excess calories without serious comorbidity with body mass index (BMI) of 39.0 to 39.9 in adult      Results for orders placed or performed in visit on 03/08/22   Urine culture    Specimen: Urine, Clean Catch   Result Value Ref Range    Urine Culture, Routine PSEUDOMONAS AERUGINOSA  >100,000 cfu/ml   (A)     Urine Culture, Routine ENTEROCOCCUS FAECALIS  10,000 - 49,999 cfu/ml   (A)        Susceptibility    Enterococcus faecalis - CULTURE, URINE     Ampicillin <=2 Sensitive mcg/mL     Nitrofurantoin <=32 Sensitive mcg/mL     Vancomycin 2 Sensitive mcg/mL    Pseudomonas aeruginosa - CULTURE, URINE     Amikacin <=16 Sensitive mcg/mL     Ciprofloxacin <=1 Sensitive mcg/mL     Cefepime <=2 Sensitive mcg/mL     Gentamicin <=4 Sensitive mcg/mL     Levofloxacin <=2 Sensitive mcg/mL     Meropenem <=1 Sensitive mcg/mL     Piperacillin/Tazo <=16 Sensitive mcg/mL     Tobramycin <=4 Sensitive mcg/mL   Urine culture    Specimen: Catheterized; Urine   Result Value Ref Range    Urine Culture, Routine ENTEROCOCCUS FAECALIS  >100,000 cfu/ml   (A)        Susceptibility    Enterococcus faecalis - CULTURE, URINE     Ampicillin <=2 Sensitive mcg/mL     Nitrofurantoin <=32 Sensitive mcg/mL      Vancomycin 4 Sensitive mcg/mL         Current Outpatient Medications:     acetaminophen (TYLENOL) 500 MG tablet, Take 500 mg by mouth every 6 (six) hours as needed for Pain., Disp: , Rfl:     albuterol (PROVENTIL/VENTOLIN HFA) 90 mcg/actuation inhaler, Inhale 2 puffs into the lungs every 6 (six) hours as needed for Wheezing., Disp: , Rfl:     beta-carotene,A,-vits C,E/mins (OCUVITE ORAL), Take 1 tablet by mouth once daily., Disp: , Rfl:     clotrimazole-betamethasone 1-0.05% (LOTRISONE) cream, 2 (two) times a day. to affected area, Disp: , Rfl:     diphenoxylate-atropine 2.5-0.025 mg (LOMOTIL) 2.5-0.025 mg per tablet, , Disp: , Rfl:     ergocalciferol, vitamin D2, (VITAMIN D ORAL), Take 25 mcg by mouth once daily., Disp: , Rfl:     folic acid (FOLVITE) 800 MCG Tab, Take 800 mcg by mouth once daily., Disp: , Rfl:     furosemide (LASIX) 40 MG tablet, Take 1 tablet (40 mg total) by mouth 2 (two) times a day., Disp: 180 tablet, Rfl: 4    gabapentin (NEURONTIN) 100 MG capsule, Take 100 mg by mouth 2 (two) times daily., Disp: , Rfl:     hydrochlorothiazide (HYDRODIURIL) 25 MG tablet, Take 25 mg by mouth once daily., Disp: , Rfl:     levothyroxine (SYNTHROID) 125 MCG tablet, Take 125 mcg by mouth before breakfast., Disp: , Rfl:     lisinopriL (PRINIVIL,ZESTRIL) 40 MG tablet, Take 40 mg by mouth once daily., Disp: , Rfl:     metoprolol succinate (TOPROL-XL) 25 MG 24 hr tablet, Take 25 mg by mouth once daily., Disp: , Rfl:     multivit with calcium,iron,min (WOMEN'S DAILY FORMULA ORAL), Take 1 tablet by mouth once daily., Disp: , Rfl:     mupirocin (BACTROBAN) 2 % ointment, APPLY CREAM TO AFFECTED AREA THREE TIMES DAILY, Disp: , Rfl:     nystatin-triamcinolone (MYCOLOG) ointment, APPLY OINTMENT TOPICALLY TO AFFECTED AREA TWICE DAILY, Disp: , Rfl:     potassium chloride (MICRO-K) 10 MEQ CpSR, , Disp: , Rfl:     promethazine (PHENERGAN) 25 MG tablet, Take 1 tablet (25 mg total) by mouth every 4 (four) hours.,  Disp: 20 tablet, Rfl: 0    tamsulosin (FLOMAX) 0.4 mg Cap, Take 0.4 mg by mouth once daily., Disp: , Rfl:     tetracycline (ACHROMYCIN,SUMYCIN) 500 MG capsule, Take 1 capsule (500 mg total) by mouth 2 (two) times daily., Disp: 20 capsule, Rfl: 0    tiotropium (SPIRIVA) 18 mcg inhalation capsule, Inhale 18 mcg into the lungs once daily., Disp: , Rfl:     tolterodine (DETROL) 2 MG Tab, , Disp: , Rfl:     TRELEGY ELLIPTA 200-62.5-25 mcg inhaler, , Disp: , Rfl:     Current Facility-Administered Medications:     acetaminophen tablet 1,000 mg, 1,000 mg, Oral, On Call Procedure, Ashu Riley MD     Lab Results   Component Value Date    WBC 8.06 10/01/2015    RBC 4.47 10/01/2015    HGB 13.3 10/01/2015    HCT 40.2 10/01/2015    MCV 90 10/01/2015    MCH 29.8 10/01/2015    MCHC 33.1 10/01/2015    RDW 13.4 10/01/2015     10/01/2015    MPV 10.5 10/01/2015    GRAN 5.3 10/01/2015    GRAN 66.0 10/01/2015    LYMPH 1.5 10/01/2015    LYMPH 19.1 10/01/2015    MONO 1.0 10/01/2015    MONO 12.3 10/01/2015    EOS 0.2 10/01/2015    BASO 0.02 10/01/2015    EOSINOPHIL 2.4 10/01/2015    BASOPHIL 0.2 10/01/2015        CMP  Lab Results   Component Value Date     02/14/2022    K 4.7 02/14/2022     02/14/2022    CO2 25 02/14/2022    GLU 92 02/14/2022    BUN 24 (H) 02/14/2022    CREATININE 1.3 02/14/2022    CALCIUM 10.5 02/14/2022    PROT 7.5 08/25/2016    ALBUMIN 4.0 08/25/2016    BILITOT 1.1 (H) 08/25/2016    ALKPHOS 78 08/25/2016    AST 25 08/25/2016    ALT 26 08/25/2016    ANIONGAP 11 02/14/2022    ESTGFRAFRICA 48 (A) 02/14/2022    EGFRNONAA 41 (A) 02/14/2022        Lab Results   Component Value Date    LABURIN ENTEROCOCCUS FAECALIS  >100,000 cfu/ml   (A) 03/28/2022            Results for orders placed or performed in visit on 03/08/22   EKG 12-lead    Collection Time: 04/04/22  6:12 AM    Narrative    Test Reason : N39.0,R31.0,    Vent. Rate : 059 BPM     Atrial Rate : 059 BPM     P-R Int : 136 ms          QRS  Dur : 086 ms      QT Int : 426 ms       P-R-T Axes : 088 056 056 degrees     QTc Int : 421 ms    Sinus bradycardia  Low voltage QRS  Borderline Abnormal ECG  When compared with ECG of 12-SEP-2016 16:14,  Premature ventricular complexes are no longer Present  Confirmed by Peng Rose MD ([id number]) on 4/4/2022 9:59:18 PM    Referred By: MARYA TRAN           Confirmed By:Peng Rose MD                  Plan:       Problem List Items Addressed This Visit        Cardiac/Vascular    History of coronary angioplasty with insertion of stent     By history only.  She has no active chest pain.           Aortic atherosclerosis     Noted on chest x-ray.  Condition stable.           Essential hypertension     She is on hydrochlorothiazide lisinopril and metoprolol.  Blood pressure today 115/50 mm Hg.  For readings are stable.  No changes advised.              Endocrine    Class 2 obesity due to excess calories without serious comorbidity in adult     Weight loss encouraged.              Other    Leg swelling     Venous insufficiency study ordered.  There is stasis dermatitis and water blebs but no skin breaks at this time.  She has tender bilateral lower extremities to touch.             Other Visit Diagnoses     Edema, unspecified type    -  Primary    Relevant Medications    furosemide (LASIX) 40 MG tablet    Other Relevant Orders    CV US Lower Extremity Veins Bilateral Insufficiency               A venous ultrasound is ordered to look for venous insufficiency.  She has impressive swelling today she has used compression stockings unsuccessfully.  Furosemide will be continued at 40 mg 2 times daily.  Once her venous study is back I will discuss it with her.    I think her shortness of breath is predominantly related to deconditioning.  Extensive rehab would be needed to get her activity tolerance back.  With the shape of her legs currently I do not think I would recommend rehab to start.    We discussed the  possibility of stress testing to evaluate for coronary disease based on her shortness of breath complaints.  I told her that perhaps in the future, a non ambulatory stress test would be considered      Thank you for allowing me to participate in your patient's care.          Giorgio Diaz MD  08/18/2022   4:33 PM

## 2022-08-18 PROBLEM — I70.0 AORTIC ATHEROSCLEROSIS: Status: ACTIVE | Noted: 2022-08-18

## 2022-08-18 PROBLEM — E66.09 CLASS 2 OBESITY DUE TO EXCESS CALORIES WITHOUT SERIOUS COMORBIDITY IN ADULT: Status: ACTIVE | Noted: 2022-08-18

## 2022-08-18 PROBLEM — E66.812 CLASS 2 OBESITY DUE TO EXCESS CALORIES WITHOUT SERIOUS COMORBIDITY IN ADULT: Status: ACTIVE | Noted: 2022-08-18

## 2022-08-18 PROBLEM — M79.89 LEG SWELLING: Status: ACTIVE | Noted: 2022-08-18

## 2022-08-18 PROBLEM — Z95.5 HISTORY OF CORONARY ANGIOPLASTY WITH INSERTION OF STENT: Status: ACTIVE | Noted: 2022-08-18

## 2022-08-18 PROBLEM — I10 ESSENTIAL HYPERTENSION: Status: ACTIVE | Noted: 2022-08-18

## 2022-08-18 NOTE — ASSESSMENT & PLAN NOTE
She is on hydrochlorothiazide lisinopril and metoprolol.  Blood pressure today 115/50 mm Hg.  For readings are stable.  No changes advised.

## 2022-08-18 NOTE — PROGRESS NOTES
Patient, Socorro Lucas (MRN #[id number]), presented with a recorded BMI of 39.61 kg/m^2 and a documented comorbidity(s):  - Hypertension  to which the severe obesity is a contributing factor. This is consistent with the definition of severe obesity (BMI 35.0-39.9) with comorbidity (ICD-10 E66.01, Z68.35). The patient's severe obesity was monitored, evaluated, addressed and/or treated. This addendum to the medical record is made on 08/18/2022.

## 2022-08-18 NOTE — ASSESSMENT & PLAN NOTE
Venous insufficiency study ordered.  There is stasis dermatitis and water blebs but no skin breaks at this time.  She has tender bilateral lower extremities to touch.

## 2022-08-19 ENCOUNTER — TELEPHONE (OUTPATIENT)
Dept: UROLOGY | Facility: CLINIC | Age: 72
End: 2022-08-19
Payer: MEDICAID

## 2022-08-19 NOTE — TELEPHONE ENCOUNTER
Spoke with patient, next chino exchange scheduled for 9/8/22 at 0930 hours for nurse visit.  Patient voiced understanding.

## 2022-08-19 NOTE — TELEPHONE ENCOUNTER
----- Message from Wanda Mejia sent at 8/19/2022 12:35 PM CDT -----  Type:  Needs Medical Advice    Who Called:  pt  Symptoms (please be specific):  would like to get a call back to get next appt for cath change      Would the patient rather a call back or a response via [facility]?  Call   Best Call Back Number: [phone]  Additional Information:

## 2022-09-08 ENCOUNTER — CLINICAL SUPPORT (OUTPATIENT)
Dept: UROLOGY | Facility: CLINIC | Age: 72
End: 2022-09-08
Payer: MEDICARE

## 2022-09-08 VITALS
HEIGHT: 61 IN | DIASTOLIC BLOOD PRESSURE: 72 MMHG | HEART RATE: 67 BPM | WEIGHT: 209.69 LBS | BODY MASS INDEX: 39.59 KG/M2 | SYSTOLIC BLOOD PRESSURE: 119 MMHG

## 2022-09-08 PROCEDURE — 99999 PR PBB SHADOW E&M-EST. PATIENT-LVL IV: ICD-10-PCS | Mod: PBBFAC,,,

## 2022-09-08 PROCEDURE — 99999 PR PBB SHADOW E&M-EST. PATIENT-LVL IV: CPT | Mod: PBBFAC,,,

## 2022-09-09 NOTE — PROGRESS NOTES
Chino exchanged.  Under sterile technique, 16 fr chino inserted into urinary bladder with clear, yellow urine output. Balloon inflated with 10 CC NS.  Catheter connected to leg bag as patient requests and secured to leg strap provided by patient. Patient teaching provided.  Next chino exchange in one month.

## 2022-10-07 ENCOUNTER — CLINICAL SUPPORT (OUTPATIENT)
Dept: UROLOGY | Facility: CLINIC | Age: 72
End: 2022-10-07
Payer: MEDICARE

## 2022-10-07 VITALS
HEIGHT: 61 IN | SYSTOLIC BLOOD PRESSURE: 112 MMHG | HEART RATE: 61 BPM | BODY MASS INDEX: 39.61 KG/M2 | DIASTOLIC BLOOD PRESSURE: 70 MMHG

## 2022-10-07 PROCEDURE — 99999 PR PBB SHADOW E&M-EST. PATIENT-LVL IV: ICD-10-PCS | Mod: PBBFAC,,,

## 2022-10-07 PROCEDURE — 99999 PR PBB SHADOW E&M-EST. PATIENT-LVL IV: CPT | Mod: PBBFAC,,,

## 2022-10-07 NOTE — PROGRESS NOTES
Chino exchange. Existing chino removed.  Under sterile technique, 16 Fr chino inserted into urinary bladder with clear, yellow urine output.  Balloon inflated with 10 cc of NS.  Chino connected to leg bag at patient's request.  Patient tolerated well.  Next chino exchange scheduled.

## 2022-11-03 ENCOUNTER — CLINICAL SUPPORT (OUTPATIENT)
Dept: UROLOGY | Facility: CLINIC | Age: 72
End: 2022-11-03
Payer: MEDICARE

## 2022-11-03 VITALS
WEIGHT: 209.69 LBS | HEIGHT: 61 IN | HEART RATE: 64 BPM | BODY MASS INDEX: 39.59 KG/M2 | SYSTOLIC BLOOD PRESSURE: 141 MMHG | DIASTOLIC BLOOD PRESSURE: 51 MMHG

## 2022-11-03 PROCEDURE — 99999 PR PBB SHADOW E&M-EST. PATIENT-LVL II: ICD-10-PCS | Mod: PBBFAC,,,

## 2022-11-03 PROCEDURE — 99999 PR PBB SHADOW E&M-EST. PATIENT-LVL II: CPT | Mod: PBBFAC,,,

## 2022-11-03 NOTE — PROGRESS NOTES
Chino exchange.  Existing chino removed.  Under sterile/aseptic technique, 16 Fr chino inserted into urethra with clear, dark yellow urine expelled from urinary bladder.  Balloon inflated with 10 cc of sterile water.  Chino placed in strap and connected to leg bag at patient's request.   Patient tolerated well.  Next appointment was scheduled for patient.

## 2022-12-01 ENCOUNTER — CLINICAL SUPPORT (OUTPATIENT)
Dept: UROLOGY | Facility: CLINIC | Age: 72
End: 2022-12-01
Payer: MEDICARE

## 2022-12-01 NOTE — PROGRESS NOTES
Chino exchange.  Under sterile technique, 16 Fr chino inserted through urethra into urinary bladder.  Clear, yellow urine output.  Balloon inflated with 10 cc of sterile water.  Leg bag applied to catheter at patient's request.  Tubing secured in leg strap.  Patient tolerated well and follow up scheduled in one month.

## 2023-01-03 ENCOUNTER — OFFICE VISIT (OUTPATIENT)
Dept: UROLOGY | Facility: CLINIC | Age: 73
End: 2023-01-03
Payer: MEDICARE

## 2023-01-03 VITALS
BODY MASS INDEX: 39.61 KG/M2 | HEART RATE: 52 BPM | DIASTOLIC BLOOD PRESSURE: 68 MMHG | HEIGHT: 61 IN | SYSTOLIC BLOOD PRESSURE: 101 MMHG

## 2023-01-03 DIAGNOSIS — R33.9 URINARY RETENTION: Primary | ICD-10-CM

## 2023-01-03 PROCEDURE — 1126F PR PAIN SEVERITY QUANTIFIED, NO PAIN PRESENT: ICD-10-PCS | Mod: CPTII,S$GLB,, | Performed by: UROLOGY

## 2023-01-03 PROCEDURE — 3288F FALL RISK ASSESSMENT DOCD: CPT | Mod: CPTII,S$GLB,, | Performed by: UROLOGY

## 2023-01-03 PROCEDURE — 99213 OFFICE O/P EST LOW 20 MIN: CPT | Mod: S$GLB,,, | Performed by: UROLOGY

## 2023-01-03 PROCEDURE — 99999 PR PBB SHADOW E&M-EST. PATIENT-LVL IV: ICD-10-PCS | Mod: PBBFAC,,, | Performed by: UROLOGY

## 2023-01-03 PROCEDURE — 1101F PT FALLS ASSESS-DOCD LE1/YR: CPT | Mod: CPTII,S$GLB,, | Performed by: UROLOGY

## 2023-01-03 PROCEDURE — 1126F AMNT PAIN NOTED NONE PRSNT: CPT | Mod: CPTII,S$GLB,, | Performed by: UROLOGY

## 2023-01-03 PROCEDURE — 1159F PR MEDICATION LIST DOCUMENTED IN MEDICAL RECORD: ICD-10-PCS | Mod: CPTII,S$GLB,, | Performed by: UROLOGY

## 2023-01-03 PROCEDURE — 1159F MED LIST DOCD IN RCRD: CPT | Mod: CPTII,S$GLB,, | Performed by: UROLOGY

## 2023-01-03 PROCEDURE — 1101F PR PT FALLS ASSESS DOC 0-1 FALLS W/OUT INJ PAST YR: ICD-10-PCS | Mod: CPTII,S$GLB,, | Performed by: UROLOGY

## 2023-01-03 PROCEDURE — 3078F DIAST BP <80 MM HG: CPT | Mod: CPTII,S$GLB,, | Performed by: UROLOGY

## 2023-01-03 PROCEDURE — 3074F SYST BP LT 130 MM HG: CPT | Mod: CPTII,S$GLB,, | Performed by: UROLOGY

## 2023-01-03 PROCEDURE — 3288F PR FALLS RISK ASSESSMENT DOCUMENTED: ICD-10-PCS | Mod: CPTII,S$GLB,, | Performed by: UROLOGY

## 2023-01-03 PROCEDURE — 1160F RVW MEDS BY RX/DR IN RCRD: CPT | Mod: CPTII,S$GLB,, | Performed by: UROLOGY

## 2023-01-03 PROCEDURE — 99213 PR OFFICE/OUTPT VISIT, EST, LEVL III, 20-29 MIN: ICD-10-PCS | Mod: S$GLB,,, | Performed by: UROLOGY

## 2023-01-03 PROCEDURE — 3074F PR MOST RECENT SYSTOLIC BLOOD PRESSURE < 130 MM HG: ICD-10-PCS | Mod: CPTII,S$GLB,, | Performed by: UROLOGY

## 2023-01-03 PROCEDURE — 1160F PR REVIEW ALL MEDS BY PRESCRIBER/CLIN PHARMACIST DOCUMENTED: ICD-10-PCS | Mod: CPTII,S$GLB,, | Performed by: UROLOGY

## 2023-01-03 PROCEDURE — 3078F PR MOST RECENT DIASTOLIC BLOOD PRESSURE < 80 MM HG: ICD-10-PCS | Mod: CPTII,S$GLB,, | Performed by: UROLOGY

## 2023-01-03 PROCEDURE — 3008F BODY MASS INDEX DOCD: CPT | Mod: CPTII,S$GLB,, | Performed by: UROLOGY

## 2023-01-03 PROCEDURE — 3008F PR BODY MASS INDEX (BMI) DOCUMENTED: ICD-10-PCS | Mod: CPTII,S$GLB,, | Performed by: UROLOGY

## 2023-01-03 PROCEDURE — 99999 PR PBB SHADOW E&M-EST. PATIENT-LVL IV: CPT | Mod: PBBFAC,,, | Performed by: UROLOGY

## 2023-01-03 NOTE — PROGRESS NOTES
Subjective:       Patient ID: Socorro Lucas is a 72 y.o. female.    Chief Complaint: Other (Follow up /Rodriguez Exchange )     This is a 72 y.o.  female patient that is an established patient. The patient was referred to me by PCP for multiple issues: recurrent UTI, dysuria, hematuria, h/o stones.  H/o recurrent UTIs, no cultures available.  States currently with pain to lower abdomen.  No flank pain.  Recently had hematuria with UTI per report, dark red urine with small clots, past smoker.  Reports UTI every 1-2 months and treated with antibiotic by PCP.  Symptoms: abd pain, frequency, urgency, gross hematuria.  H/o stones in 2015 and 2016 requiring intervention,  Started K citrate at the time.       PVR elevated, chronic Rodriguez catheter placed.    Monthly cath changes    Lab Results   Component Value Date    CREATININE 1.3 02/14/2022       ---  Past Medical History:   Diagnosis Date    COPD (chronic obstructive pulmonary disease)     DVT (deep venous thrombosis) left leg post cholecystectomy    Hypertension     Hypothyroidism     Kidney stone     Thyroid disease     Urinary tract infection     Vaginal infection        Past Surgical History:   Procedure Laterality Date    APPENDECTOMY      BREAST SURGERY      biopsy only x 6    CARPAL TUNNEL RELEASE Bilateral     CHOLECYSTECTOMY      CYSTOSCOPY      CYSTOSCOPY WITH CYSTOGRAPHY N/A 4/4/2022    Procedure: CYSTOSCOPY, WITH CYSTOGRAM;  Surgeon: Ashu Riley MD;  Location: [facility] OR;  Service: Urology;  Laterality: N/A;    CYSTOSCOPY WITH URODYNAMIC TESTING N/A 4/18/2022    Procedure: CYSTOSCOPY, WITH URODYNAMIC TESTING FLOUROSCOPIC;  Surgeon: Trae Em MD;  [facility];  Service: Urology;  Laterality: N/A;  1hr    CHARI FILTER PLACEMENT      INSERTION OF URETERAL CATHETER N/A 4/4/2022    Procedure: INSERTION, CATHETER, URETER;  Surgeon: Ashu Riley MD;  Location: [facility] OR;  Service: Urology;  Laterality: N/A;     INSERTION OF URETERAL CATHETER  2022    Procedure: ;  Surgeon: Ashu Riley MD;  Location: [facility] OR;  Service: Urology;;    RETROGRADE PYELOGRAPHY Bilateral 2022    Procedure: PYELOGRAM, RETROGRADE;  Surgeon: Ashu Riley MD;  Location: [facility] OR;  Service: Urology;  Laterality: Bilateral;    URETEROSCOPY Left 2022    Procedure: URETEROSCOPY;  Surgeon: Ashu Riley MD;  Location: [facility] OR;  Service: Urology;  Laterality: Left;       Family History   Problem Relation Age of Onset    No Known Problems Mother     No Known Problems Father     Kidney disease Neg Hx        Social History     Tobacco Use    Smoking status: Former     Types: Cigarettes     Quit date: 1996     Years since quittin.0    Smokeless tobacco: Never   Substance Use Topics    Alcohol use: No     Alcohol/week: 0.0 standard drinks    Drug use: No       Current Outpatient Medications on File Prior to Visit   Medication Sig Dispense Refill    acetaminophen (TYLENOL) 500 MG tablet Take 500 mg by mouth every 6 (six) hours as needed for Pain.      albuterol (PROVENTIL/VENTOLIN HFA) 90 mcg/actuation inhaler Inhale 2 puffs into the lungs every 6 (six) hours as needed for Wheezing.      beta-carotene,A,-vits C,E/mins (OCUVITE ORAL) Take 1 tablet by mouth once daily.      clotrimazole-betamethasone 1-0.05% (LOTRISONE) cream 2 (two) times a day. to affected area      diphenoxylate-atropine 2.5-0.025 mg (LOMOTIL) 2.5-0.025 mg per tablet       ergocalciferol, vitamin D2, (VITAMIN D ORAL) Take 25 mcg by mouth once daily.      folic acid (FOLVITE) 800 MCG Tab Take 800 mcg by mouth once daily.      furosemide (LASIX) 40 MG tablet Take 1 tablet (40 mg total) by mouth 2 (two) times a day. 180 tablet 4    gabapentin (NEURONTIN) 100 MG capsule Take 100 mg by mouth 2 (two) times daily.      hydrochlorothiazide (HYDRODIURIL) 25 MG tablet Take 25 mg by mouth once daily.      levothyroxine (SYNTHROID) 125 MCG tablet Take 125 mcg by  mouth before breakfast.      lisinopriL (PRINIVIL,ZESTRIL) 40 MG tablet Take 40 mg by mouth once daily.      metoprolol succinate (TOPROL-XL) 25 MG 24 hr tablet Take 25 mg by mouth once daily.      multivit with calcium,iron,min (WOMEN'S DAILY FORMULA ORAL) Take 1 tablet by mouth once daily.      mupirocin (BACTROBAN) 2 % ointment APPLY CREAM TO AFFECTED AREA THREE TIMES DAILY      mv,calcium,min/iron/folic/vitK (ONE-A-DAY WOMEN'S COMPLETE ORAL) Take by mouth.      nystatin-triamcinolone (MYCOLOG) ointment APPLY OINTMENT TOPICALLY TO AFFECTED AREA TWICE DAILY      potassium chloride (MICRO-K) 10 MEQ CpSR       promethazine (PHENERGAN) 25 MG tablet Take 1 tablet (25 mg total) by mouth every 4 (four) hours. 20 tablet 0    tamsulosin (FLOMAX) 0.4 mg Cap Take 0.4 mg by mouth once daily.      tetracycline (ACHROMYCIN,SUMYCIN) 500 MG capsule Take 1 capsule (500 mg total) by mouth 2 (two) times daily. 20 capsule 0    tiotropium (SPIRIVA) 18 mcg inhalation capsule Inhale 18 mcg into the lungs once daily.      tolterodine (DETROL) 2 MG Tab       TRELEGY ELLIPTA 200-62.5-25 mcg inhaler        Current Facility-Administered Medications on File Prior to Visit   Medication Dose Route Frequency Provider Last Rate Last Admin    acetaminophen tablet 1,000 mg  1,000 mg Oral On Call Procedure Ashu Riley MD           Review of patient's allergies indicates:   Allergen Reactions    Macrobid [nitrofurantoin monohyd/m-cryst] Swelling    Sulfa (sulfonamide antibiotics) Anaphylaxis    Asa [aspirin] Other (See Comments)    Codeine Nausea Only       Review of Systems   Constitutional:  Negative for activity change, appetite change, chills and fever.   Respiratory:  Negative for cough, chest tightness and shortness of breath.    Cardiovascular:  Negative for chest pain.   Gastrointestinal:  Negative for abdominal distention, abdominal pain, nausea and vomiting.   Genitourinary:  Positive for difficulty urinating. Negative for dysuria,  flank pain, frequency, hematuria, pelvic pain and urgency.   Neurological:  Negative for dizziness.   Hematological:  Does not bruise/bleed easily.   Psychiatric/Behavioral:  Negative for agitation.      Objective:      Physical Exam  HENT:      Head: Normocephalic.   Cardiovascular:      Pulses: Normal pulses.   Pulmonary:      Effort: Pulmonary effort is normal.   Abdominal:      General: Abdomen is flat. There is no distension.      Palpations: Abdomen is soft.      Tenderness: There is no abdominal tenderness. There is no right CVA tenderness, left CVA tenderness or guarding.   Musculoskeletal:      Cervical back: Normal range of motion.   Skin:     General: Skin is warm.   Neurological:      General: No focal deficit present.      Mental Status: She is alert.         No results found for this or any previous visit (from the past 2160 hour(s)).  UDS:       No void, normal compliance bilateral grade 1 reflux    Assessment:     Problem Noted   Gross Hematuria 2/14/2022    With recent UTI per patient no culture available, past smoker.   --CTU 2/22: bilateral hydroureter, abrupt contrast cut off on left, distended bladder/trabeculation  --4/4/22 cysto, bilateral RPGs, left URS, cystogram--findings no left lesions, chronic cystitis, bilateral reflux w/o significant hydronephrosis     Urinary Retention 4/12/2022    , 280 and on cystoscopy, has BL reflux   Cath placed 4/4/22  UDS 4/22:  No void, normal compliance bilateral grade 1 reflux     Recurrent Uti 2/14/2022    Enterococcus 2/2022 treated  Retention as above with reflux     Kidney Stones 10/7/2015    Treatment in 2015 and 2016, was on K citra in past  2mm left stone on CTU 2/2022     Nephrolithiasis (Resolved) 9/14/2016   Lesion of Bladder (Resolved) 9/14/2016       Plan:     1.  16 Greek Rodriguez changed by nurse Hudson  2.  Follow up in 1 month for change     Ashu Riley MD

## 2023-01-31 ENCOUNTER — CLINICAL SUPPORT (OUTPATIENT)
Dept: UROLOGY | Facility: CLINIC | Age: 73
End: 2023-01-31
Payer: MEDICARE

## 2023-01-31 VITALS
WEIGHT: 196.63 LBS | SYSTOLIC BLOOD PRESSURE: 120 MMHG | HEART RATE: 58 BPM | DIASTOLIC BLOOD PRESSURE: 73 MMHG | BODY MASS INDEX: 37.16 KG/M2

## 2023-01-31 PROCEDURE — 99999 PR PBB SHADOW E&M-EST. PATIENT-LVL IV: CPT | Mod: PBBFAC,,,

## 2023-01-31 PROCEDURE — 99999 PR PBB SHADOW E&M-EST. PATIENT-LVL IV: ICD-10-PCS | Mod: PBBFAC,,,

## 2023-01-31 NOTE — PROGRESS NOTES
Chino exchange.  Under sterile technique, 16 Fr chino inserted through urethra into urinary bladder.  Clear, yellow urine output.  Balloon inflated with 10 cc of sterile water.  Leg bag applied to catheter at patient's request. Tubing secured in leg strap on right thigh.  Patient tolerated well and follow up scheduled in one month.

## 2023-02-28 ENCOUNTER — CLINICAL SUPPORT (OUTPATIENT)
Dept: UROLOGY | Facility: CLINIC | Age: 73
End: 2023-02-28
Payer: MEDICARE

## 2023-02-28 VITALS
DIASTOLIC BLOOD PRESSURE: 55 MMHG | HEIGHT: 61 IN | BODY MASS INDEX: 37.16 KG/M2 | SYSTOLIC BLOOD PRESSURE: 108 MMHG | HEART RATE: 67 BPM

## 2023-02-28 PROCEDURE — 99999 PR PBB SHADOW E&M-EST. PATIENT-LVL III: CPT | Mod: PBBFAC,,,

## 2023-02-28 PROCEDURE — 99999 PR PBB SHADOW E&M-EST. PATIENT-LVL III: ICD-10-PCS | Mod: PBBFAC,,,

## 2023-03-28 ENCOUNTER — TELEPHONE (OUTPATIENT)
Dept: UROLOGY | Facility: CLINIC | Age: 73
End: 2023-03-28
Payer: MEDICARE

## 2023-03-28 NOTE — TELEPHONE ENCOUNTER
I returned the patient call regarding canceling upcoming appt due to Covid. I rescheduled the patient appointment to 04/14 at 9:30 am. Patient agreed.

## 2023-03-28 NOTE — TELEPHONE ENCOUNTER
----- Message from [facility] sent at 3/28/2023 11:10 AM CDT -----  Type:  Needs Medical Advice    Who Called: Pt   Would the patient rather a call back or a response via [facility]? Callback   Best Call Back Number: [phone]  Additional Information: Pt requesting callback from office to discuss canceling upcoming appt due to covid symptoms

## 2023-03-31 DIAGNOSIS — R05.9 COUGH: Primary | ICD-10-CM

## 2023-04-05 ENCOUNTER — HOSPITAL ENCOUNTER (INPATIENT)
Facility: HOSPITAL | Age: 73
LOS: 2 days | Discharge: HOME HEALTH | DRG: 684 | End: 2023-04-07
Attending: EMERGENCY MEDICINE | Admitting: INTERNAL MEDICINE
Payer: MEDICARE

## 2023-04-05 ENCOUNTER — HOSPITAL ENCOUNTER (OUTPATIENT)
Dept: RADIOLOGY | Facility: HOSPITAL | Age: 73
Discharge: HOME | DRG: 684 | End: 2023-04-05
Attending: FAMILY MEDICINE
Payer: MEDICARE

## 2023-04-05 ENCOUNTER — CLINICAL SUPPORT (OUTPATIENT)
Dept: UROLOGY | Facility: CLINIC | Age: 73
End: 2023-04-05
Payer: MEDICARE

## 2023-04-05 VITALS
HEIGHT: 61 IN | DIASTOLIC BLOOD PRESSURE: 53 MMHG | SYSTOLIC BLOOD PRESSURE: 92 MMHG | HEART RATE: 71 BPM | TEMPERATURE: 98 F | BODY MASS INDEX: 35.9 KG/M2

## 2023-04-05 DIAGNOSIS — R06.00 PERSISTENT DYSPNEA AFTER COVID-19: ICD-10-CM

## 2023-04-05 DIAGNOSIS — R05.9 COUGH: ICD-10-CM

## 2023-04-05 DIAGNOSIS — R34 DECREASED URINE OUTPUT: ICD-10-CM

## 2023-04-05 DIAGNOSIS — N17.9 ACUTE RENAL FAILURE, UNSPECIFIED ACUTE RENAL FAILURE TYPE: Primary | ICD-10-CM

## 2023-04-05 DIAGNOSIS — R33.9 URINARY RETENTION: Primary | ICD-10-CM

## 2023-04-05 DIAGNOSIS — R06.02 SOB (SHORTNESS OF BREATH): ICD-10-CM

## 2023-04-05 DIAGNOSIS — R00.2 PALPITATIONS: ICD-10-CM

## 2023-04-05 DIAGNOSIS — E86.0 DEHYDRATION: ICD-10-CM

## 2023-04-05 DIAGNOSIS — U09.9 PERSISTENT DYSPNEA AFTER COVID-19: ICD-10-CM

## 2023-04-05 PROBLEM — E87.5 HYPERKALEMIA: Status: ACTIVE | Noted: 2023-04-05

## 2023-04-05 LAB
ALBUMIN SERPL BCP-MCNC: 3.2 G/DL (ref 3.5–5.2)
ALP SERPL-CCNC: 118 U/L (ref 55–135)
ALT SERPL W/O P-5'-P-CCNC: 19 U/L (ref 10–44)
ANION GAP SERPL CALC-SCNC: 15 MMOL/L (ref 8–16)
ANION GAP SERPL CALC-SCNC: 16 MMOL/L (ref 8–16)
AST SERPL-CCNC: 22 U/L (ref 10–40)
BACTERIA #/AREA URNS HPF: ABNORMAL /HPF
BASOPHILS # BLD AUTO: 0.04 K/UL (ref 0–0.2)
BASOPHILS NFR BLD: 0.4 % (ref 0–1.9)
BILIRUB SERPL-MCNC: 0.4 MG/DL (ref 0.1–1)
BILIRUB UR QL STRIP: NEGATIVE
BNP SERPL-MCNC: 65 PG/ML (ref 0–99)
BUN SERPL-MCNC: 108 MG/DL (ref 8–23)
BUN SERPL-MCNC: 127 MG/DL (ref 8–23)
CALCIUM SERPL-MCNC: 8.8 MG/DL (ref 8.7–10.5)
CALCIUM SERPL-MCNC: 9.7 MG/DL (ref 8.7–10.5)
CHLORIDE SERPL-SCNC: 102 MMOL/L (ref 95–110)
CHLORIDE SERPL-SCNC: 108 MMOL/L (ref 95–110)
CK SERPL-CCNC: 73 U/L (ref 20–180)
CLARITY UR: CLEAR
CO2 SERPL-SCNC: 18 MMOL/L (ref 23–29)
CO2 SERPL-SCNC: 19 MMOL/L (ref 23–29)
COLOR UR: COLORLESS
CREAT SERPL-MCNC: 3.7 MG/DL (ref 0.5–1.4)
CREAT SERPL-MCNC: 4.8 MG/DL (ref 0.5–1.4)
CREAT UR-MCNC: 26.8 MG/DL (ref 15–325)
DIFFERENTIAL METHOD: ABNORMAL
EOSINOPHIL # BLD AUTO: 0.2 K/UL (ref 0–0.5)
EOSINOPHIL NFR BLD: 1.9 % (ref 0–8)
ERYTHROCYTE [DISTWIDTH] IN BLOOD BY AUTOMATED COUNT: 13 % (ref 11.5–14.5)
EST. GFR  (NO RACE VARIABLE): 12 ML/MIN/1.73 M^2
EST. GFR  (NO RACE VARIABLE): 9 ML/MIN/1.73 M^2
FERRITIN SERPL-MCNC: 341 NG/ML (ref 20–300)
FOLATE SERPL-MCNC: 20 NG/ML (ref 4–24)
GLUCOSE SERPL-MCNC: 90 MG/DL (ref 70–110)
GLUCOSE SERPL-MCNC: 98 MG/DL (ref 70–110)
GLUCOSE UR QL STRIP: NEGATIVE
HCT VFR BLD AUTO: 37.3 % (ref 37–48.5)
HGB BLD-MCNC: 12.2 G/DL (ref 12–16)
HGB UR QL STRIP: NEGATIVE
HYALINE CASTS #/AREA URNS LPF: 8 /LPF
IMM GRANULOCYTES # BLD AUTO: 0.16 K/UL (ref 0–0.04)
IMM GRANULOCYTES NFR BLD AUTO: 1.7 % (ref 0–0.5)
KETONES UR QL STRIP: NEGATIVE
LACTATE SERPL-SCNC: 1.6 MMOL/L (ref 0.5–2.2)
LEUKOCYTE ESTERASE UR QL STRIP: ABNORMAL
LYMPHOCYTES # BLD AUTO: 1.3 K/UL (ref 1–4.8)
LYMPHOCYTES NFR BLD: 13.9 % (ref 18–48)
MAGNESIUM SERPL-MCNC: 2.2 MG/DL (ref 1.6–2.6)
MCH RBC QN AUTO: 30.4 PG (ref 27–31)
MCHC RBC AUTO-ENTMCNC: 32.7 G/DL (ref 32–36)
MCV RBC AUTO: 93 FL (ref 82–98)
MICROSCOPIC COMMENT: ABNORMAL
MONOCYTES # BLD AUTO: 0.8 K/UL (ref 0.3–1)
MONOCYTES NFR BLD: 8.7 % (ref 4–15)
NEUTROPHILS # BLD AUTO: 6.7 K/UL (ref 1.8–7.7)
NEUTROPHILS NFR BLD: 73.4 % (ref 38–73)
NITRITE UR QL STRIP: NEGATIVE
NRBC BLD-RTO: 0 /100 WBC
PH UR STRIP: 5 [PH] (ref 5–8)
PHOSPHATE SERPL-MCNC: 6.4 MG/DL (ref 2.7–4.5)
PHOSPHATE SERPL-MCNC: 6.5 MG/DL (ref 2.7–4.5)
PLATELET # BLD AUTO: 271 K/UL (ref 150–450)
PMV BLD AUTO: 10 FL (ref 9.2–12.9)
POCT GLUCOSE: 94 MG/DL (ref 70–110)
POTASSIUM SERPL-SCNC: 5.6 MMOL/L (ref 3.5–5.1)
POTASSIUM SERPL-SCNC: 5.9 MMOL/L (ref 3.5–5.1)
PROCALCITONIN SERPL IA-MCNC: 0.12 NG/ML
PROT SERPL-MCNC: 7.8 G/DL (ref 6–8.4)
PROT UR QL STRIP: NEGATIVE
RBC # BLD AUTO: 4.01 M/UL (ref 4–5.4)
RBC #/AREA URNS HPF: 0 /HPF (ref 0–4)
SARS-COV-2 RDRP RESP QL NAA+PROBE: NEGATIVE
SODIUM SERPL-SCNC: 137 MMOL/L (ref 136–145)
SODIUM SERPL-SCNC: 141 MMOL/L (ref 136–145)
SODIUM UR-SCNC: 75 MMOL/L (ref 20–250)
SP GR UR STRIP: 1.01 (ref 1–1.03)
SQUAMOUS #/AREA URNS HPF: ABNORMAL /HPF
T4 FREE SERPL-MCNC: 1.26 NG/DL (ref 0.71–1.51)
TSH SERPL DL<=0.005 MIU/L-ACNC: 0.27 UIU/ML (ref 0.4–4)
URN SPEC COLLECT METH UR: ABNORMAL
UROBILINOGEN UR STRIP-ACNC: NEGATIVE EU/DL
UUN UR-MCNC: 296 MG/DL (ref 140–1050)
VIT B12 SERPL-MCNC: 1363 PG/ML (ref 210–950)
WBC # BLD AUTO: 9.16 K/UL (ref 3.9–12.7)
WBC #/AREA URNS HPF: 20 /HPF (ref 0–5)
YEAST URNS QL MICRO: ABNORMAL

## 2023-04-05 PROCEDURE — 85025 COMPLETE CBC W/AUTO DIFF WBC: CPT | Performed by: NURSE PRACTITIONER

## 2023-04-05 PROCEDURE — 82570 ASSAY OF URINE CREATININE: CPT | Performed by: EMERGENCY MEDICINE

## 2023-04-05 PROCEDURE — 84439 ASSAY OF FREE THYROXINE: CPT | Performed by: INTERNAL MEDICINE

## 2023-04-05 PROCEDURE — 84443 ASSAY THYROID STIM HORMONE: CPT | Performed by: INTERNAL MEDICINE

## 2023-04-05 PROCEDURE — 93005 ELECTROCARDIOGRAM TRACING: CPT

## 2023-04-05 PROCEDURE — 82962 GLUCOSE BLOOD TEST: CPT

## 2023-04-05 PROCEDURE — 27000221 HC OXYGEN, UP TO 24 HOURS

## 2023-04-05 PROCEDURE — 80053 COMPREHEN METABOLIC PANEL: CPT | Performed by: NURSE PRACTITIONER

## 2023-04-05 PROCEDURE — 84145 PROCALCITONIN (PCT): CPT | Performed by: STUDENT IN AN ORGANIZED HEALTH CARE EDUCATION/TRAINING PROGRAM

## 2023-04-05 PROCEDURE — 87077 CULTURE AEROBIC IDENTIFY: CPT | Mod: 59 | Performed by: UROLOGY

## 2023-04-05 PROCEDURE — 71046 XR CHEST PA AND LATERAL: ICD-10-PCS | Mod: 26,,, | Performed by: RADIOLOGY

## 2023-04-05 PROCEDURE — 81000 URINALYSIS NONAUTO W/SCOPE: CPT | Performed by: NURSE PRACTITIONER

## 2023-04-05 PROCEDURE — 25000242 PHARM REV CODE 250 ALT 637 W/ HCPCS: Performed by: EMERGENCY MEDICINE

## 2023-04-05 PROCEDURE — 84466 ASSAY OF TRANSFERRIN: CPT | Performed by: INTERNAL MEDICINE

## 2023-04-05 PROCEDURE — 71046 X-RAY EXAM CHEST 2 VIEWS: CPT | Mod: TC,FY

## 2023-04-05 PROCEDURE — 84540 ASSAY OF URINE/UREA-N: CPT | Performed by: EMERGENCY MEDICINE

## 2023-04-05 PROCEDURE — 84300 ASSAY OF URINE SODIUM: CPT | Performed by: EMERGENCY MEDICINE

## 2023-04-05 PROCEDURE — 87077 CULTURE AEROBIC IDENTIFY: CPT | Performed by: NURSE PRACTITIONER

## 2023-04-05 PROCEDURE — 25000242 PHARM REV CODE 250 ALT 637 W/ HCPCS: Performed by: STUDENT IN AN ORGANIZED HEALTH CARE EDUCATION/TRAINING PROGRAM

## 2023-04-05 PROCEDURE — 82550 ASSAY OF CK (CPK): CPT | Performed by: STUDENT IN AN ORGANIZED HEALTH CARE EDUCATION/TRAINING PROGRAM

## 2023-04-05 PROCEDURE — 36415 COLL VENOUS BLD VENIPUNCTURE: CPT | Performed by: STUDENT IN AN ORGANIZED HEALTH CARE EDUCATION/TRAINING PROGRAM

## 2023-04-05 PROCEDURE — 93010 ELECTROCARDIOGRAM REPORT: CPT | Mod: ,,, | Performed by: INTERNAL MEDICINE

## 2023-04-05 PROCEDURE — 93010 EKG 12-LEAD: ICD-10-PCS | Mod: ,,, | Performed by: INTERNAL MEDICINE

## 2023-04-05 PROCEDURE — 25000003 PHARM REV CODE 250: Performed by: EMERGENCY MEDICINE

## 2023-04-05 PROCEDURE — 99900035 HC TECH TIME PER 15 MIN (STAT)

## 2023-04-05 PROCEDURE — 94640 AIRWAY INHALATION TREATMENT: CPT

## 2023-04-05 PROCEDURE — U0002 COVID-19 LAB TEST NON-CDC: HCPCS | Performed by: EMERGENCY MEDICINE

## 2023-04-05 PROCEDURE — 82746 ASSAY OF FOLIC ACID SERUM: CPT | Performed by: STUDENT IN AN ORGANIZED HEALTH CARE EDUCATION/TRAINING PROGRAM

## 2023-04-05 PROCEDURE — 94761 N-INVAS EAR/PLS OXIMETRY MLT: CPT

## 2023-04-05 PROCEDURE — 82607 VITAMIN B-12: CPT | Performed by: STUDENT IN AN ORGANIZED HEALTH CARE EDUCATION/TRAINING PROGRAM

## 2023-04-05 PROCEDURE — 83605 ASSAY OF LACTIC ACID: CPT | Performed by: STUDENT IN AN ORGANIZED HEALTH CARE EDUCATION/TRAINING PROGRAM

## 2023-04-05 PROCEDURE — 63600175 PHARM REV CODE 636 W HCPCS: Performed by: STUDENT IN AN ORGANIZED HEALTH CARE EDUCATION/TRAINING PROGRAM

## 2023-04-05 PROCEDURE — 87086 URINE CULTURE/COLONY COUNT: CPT | Performed by: NURSE PRACTITIONER

## 2023-04-05 PROCEDURE — 71046 X-RAY EXAM CHEST 2 VIEWS: CPT | Mod: 26,,, | Performed by: RADIOLOGY

## 2023-04-05 PROCEDURE — 83880 ASSAY OF NATRIURETIC PEPTIDE: CPT | Performed by: STUDENT IN AN ORGANIZED HEALTH CARE EDUCATION/TRAINING PROGRAM

## 2023-04-05 PROCEDURE — 87088 URINE BACTERIA CULTURE: CPT | Mod: 59 | Performed by: UROLOGY

## 2023-04-05 PROCEDURE — 87088 URINE BACTERIA CULTURE: CPT | Performed by: NURSE PRACTITIONER

## 2023-04-05 PROCEDURE — 83735 ASSAY OF MAGNESIUM: CPT | Performed by: NURSE PRACTITIONER

## 2023-04-05 PROCEDURE — 99999 PR PBB SHADOW E&M-EST. PATIENT-LVL III: ICD-10-PCS | Mod: PBBFAC,,,

## 2023-04-05 PROCEDURE — 87040 BLOOD CULTURE FOR BACTERIA: CPT | Performed by: STUDENT IN AN ORGANIZED HEALTH CARE EDUCATION/TRAINING PROGRAM

## 2023-04-05 PROCEDURE — 84100 ASSAY OF PHOSPHORUS: CPT | Performed by: STUDENT IN AN ORGANIZED HEALTH CARE EDUCATION/TRAINING PROGRAM

## 2023-04-05 PROCEDURE — 87186 SC STD MICRODIL/AGAR DIL: CPT | Mod: 59 | Performed by: UROLOGY

## 2023-04-05 PROCEDURE — 87186 SC STD MICRODIL/AGAR DIL: CPT | Performed by: NURSE PRACTITIONER

## 2023-04-05 PROCEDURE — 99999 PR PBB SHADOW E&M-EST. PATIENT-LVL III: CPT | Mod: PBBFAC,,,

## 2023-04-05 PROCEDURE — 11000001 HC ACUTE MED/SURG PRIVATE ROOM

## 2023-04-05 PROCEDURE — 82728 ASSAY OF FERRITIN: CPT | Performed by: INTERNAL MEDICINE

## 2023-04-05 PROCEDURE — 82728 ASSAY OF FERRITIN: CPT | Performed by: STUDENT IN AN ORGANIZED HEALTH CARE EDUCATION/TRAINING PROGRAM

## 2023-04-05 PROCEDURE — 84443 ASSAY THYROID STIM HORMONE: CPT | Performed by: STUDENT IN AN ORGANIZED HEALTH CARE EDUCATION/TRAINING PROGRAM

## 2023-04-05 PROCEDURE — 99285 EMERGENCY DEPT VISIT HI MDM: CPT | Mod: 25

## 2023-04-05 PROCEDURE — 87086 URINE CULTURE/COLONY COUNT: CPT | Mod: 59 | Performed by: UROLOGY

## 2023-04-05 PROCEDURE — 80048 BASIC METABOLIC PNL TOTAL CA: CPT | Mod: XB | Performed by: STUDENT IN AN ORGANIZED HEALTH CARE EDUCATION/TRAINING PROGRAM

## 2023-04-05 RX ORDER — IPRATROPIUM BROMIDE 0.5 MG/2.5ML
0.5 SOLUTION RESPIRATORY (INHALATION)
Status: DISCONTINUED | OUTPATIENT
Start: 2023-04-05 | End: 2023-04-06

## 2023-04-05 RX ORDER — IPRATROPIUM BROMIDE 0.5 MG/2.5ML
0.5 SOLUTION RESPIRATORY (INHALATION) EVERY 6 HOURS
Status: DISCONTINUED | OUTPATIENT
Start: 2023-04-05 | End: 2023-04-05

## 2023-04-05 RX ORDER — ALBUTEROL SULFATE 0.83 MG/ML
10 SOLUTION RESPIRATORY (INHALATION)
Status: DISCONTINUED | OUTPATIENT
Start: 2023-04-05 | End: 2023-04-05

## 2023-04-05 RX ORDER — ALBUTEROL SULFATE 2.5 MG/.5ML
2.5 SOLUTION RESPIRATORY (INHALATION)
Status: CANCELLED | OUTPATIENT
Start: 2023-04-05

## 2023-04-05 RX ORDER — OXYBUTYNIN CHLORIDE 5 MG/1
10 TABLET, EXTENDED RELEASE ORAL DAILY
Status: DISCONTINUED | OUTPATIENT
Start: 2023-04-05 | End: 2023-04-05

## 2023-04-05 RX ORDER — ALBUTEROL SULFATE 2.5 MG/.5ML
10 SOLUTION RESPIRATORY (INHALATION) ONCE
Status: COMPLETED | OUTPATIENT
Start: 2023-04-05 | End: 2023-04-05

## 2023-04-05 RX ORDER — IPRATROPIUM BROMIDE AND ALBUTEROL SULFATE 2.5; .5 MG/3ML; MG/3ML
3 SOLUTION RESPIRATORY (INHALATION)
Status: DISCONTINUED | OUTPATIENT
Start: 2023-04-05 | End: 2023-04-05

## 2023-04-05 RX ORDER — ALBUTEROL SULFATE 2.5 MG/.5ML
SOLUTION RESPIRATORY (INHALATION)
Status: DISPENSED
Start: 2023-04-05 | End: 2023-04-06

## 2023-04-05 RX ORDER — OXYBUTYNIN CHLORIDE 5 MG/1
10 TABLET, EXTENDED RELEASE ORAL DAILY
Status: DISCONTINUED | OUTPATIENT
Start: 2023-04-06 | End: 2023-04-07

## 2023-04-05 RX ORDER — SODIUM CHLORIDE, SODIUM LACTATE, POTASSIUM CHLORIDE, CALCIUM CHLORIDE 600; 310; 30; 20 MG/100ML; MG/100ML; MG/100ML; MG/100ML
INJECTION, SOLUTION INTRAVENOUS CONTINUOUS
Status: DISCONTINUED | OUTPATIENT
Start: 2023-04-05 | End: 2023-04-06

## 2023-04-05 RX ORDER — SODIUM CHLORIDE, SODIUM LACTATE, POTASSIUM CHLORIDE, CALCIUM CHLORIDE 600; 310; 30; 20 MG/100ML; MG/100ML; MG/100ML; MG/100ML
INJECTION, SOLUTION INTRAVENOUS CONTINUOUS
Status: DISCONTINUED | OUTPATIENT
Start: 2023-04-05 | End: 2023-04-05

## 2023-04-05 RX ORDER — ALBUTEROL SULFATE 2.5 MG/.5ML
2.5 SOLUTION RESPIRATORY (INHALATION) EVERY 6 HOURS
Status: DISCONTINUED | OUTPATIENT
Start: 2023-04-05 | End: 2023-04-07 | Stop reason: HOSPADM

## 2023-04-05 RX ORDER — ALBUTEROL SULFATE 0.83 MG/ML
2.5 SOLUTION RESPIRATORY (INHALATION)
Status: DISCONTINUED | OUTPATIENT
Start: 2023-04-05 | End: 2023-04-05 | Stop reason: CLARIF

## 2023-04-05 RX ADMIN — SODIUM CHLORIDE, SODIUM LACTATE, POTASSIUM CHLORIDE, AND CALCIUM CHLORIDE 1000 ML: .6; .31; .03; .02 INJECTION, SOLUTION INTRAVENOUS at 06:04

## 2023-04-05 RX ADMIN — IPRATROPIUM BROMIDE 0.5 MG: 0.5 SOLUTION RESPIRATORY (INHALATION) at 09:04

## 2023-04-05 RX ADMIN — ALBUTEROL SULFATE 10 MG: 2.5 SOLUTION RESPIRATORY (INHALATION) at 02:04

## 2023-04-05 RX ADMIN — SODIUM CHLORIDE 1000 ML: 0.9 INJECTION, SOLUTION INTRAVENOUS at 12:04

## 2023-04-05 RX ADMIN — SODIUM ZIRCONIUM CYCLOSILICATE 10 G: 10 POWDER, FOR SUSPENSION ORAL at 02:04

## 2023-04-05 RX ADMIN — SODIUM CHLORIDE, SODIUM LACTATE, POTASSIUM CHLORIDE, AND CALCIUM CHLORIDE: .6; .31; .03; .02 INJECTION, SOLUTION INTRAVENOUS at 11:04

## 2023-04-05 NOTE — PROGRESS NOTES
Chino exchange.  Patient reports not feeling well.  Diagnosed with COVID less than 2 weeks ago.  Systolic blood pressure at home 80's this morning.  Complains of weakness, cough.  Observed wheezing, lethargic, unable to sit in upright position.  Vitals obtained, patient afebrile.  Patient had bowel movement on herself in exam room (watery, loose, non formed brown stool)  Existing chino balloon deflated-9 cc and removed fully intact.  Sterile, aseptic technique used.  16 Fr chino inserted into urinary bladder with yellow urine output, balloon inflated with 10 cc of sterile water (urine sample collected), and connected to leg bag at patient's request.  Connected to strap supplied by patient.  Tolerated well.  Contacted Dr Riley to inform of patient's condition.  He spoke with patient, recommended urine culture and further evaluation by ED.  Sister in law and myself assisted patient getting dressed.  Patient sent to ED.

## 2023-04-05 NOTE — H&P
"[facility] Medicine H&P Note     Admitting Team: [facility] Hospitalist Team A  Attending Physician: Fadia  Resident: Avila  Intern: Owen    Date of Admit: 4/5/2023    Chief Complaint     Persistent and worsening fatigue/weakness for ~2-weeks      Subjective:      History of Present Illness:  Socorro Lucas is a 72 y.o. female with a PMHx of chronic UTI & uretal reflux s/p placement of chronic indwelling catether, CKD-IV, COPD, HTN, hypothyroidism, DVT. The patient presented on 4/5/2023 for evaluation of persistent and worsening generalized weakness and fatigue for approximately 2-weeks in the setting of diagnosed COVID-19 on 03/22/23.    The patient states that she was in her normal state of health until approximately 2-weeks prior when she noticed the onset of fatigue/generalized weakness/malaise with increased cough from baseline. She presented to an outside Urgent Care facility at that time and was found to be COVID-19 positive. She received a cough suppressant and steroid injection and was discharged. Later that week, the patients home health nurse noticed an increase in wheezing and thusly informed her PCP who initiated the patient on a 5-day course of azithromycin. During this time, the patient remained with fatigue and generalized weakness that seemed to be worsening and not returning to her baseline.     On the morning of presentation to [facility] ED, the patient attended a scheduled Urology appointment for changing of her indwelling catheter (placed for hx of chronic UTI, reflux, stones). At this visit, her urologist advised her to present to [facility] ED for evaluation of her continued fatigue and weakness in the setting of SBP ~80s at his office.    On interview, the patient states she continues to feel "tired" and like her "muscles are weak." She states her cough is improving. She does state that she has experienced diarrhea for the last 2-days as well, described as loose and without blood or melena. She " states that her appetite and PO intake have been decreased since the week of her COVID-19 diagnosis.     On ED admit labs, she was found to have an MINDI and hyperkalemia.    [facility] IM was consulted for the evaluation and management of MINDI and suspected deconditioning 2/2 previous COVID-19 infection c/b decreased mobility in the setting of chronic lower extremity venous stasis.    History obtained by patient interview and supplemented by nursing documentation and chart review.     Past Medical History:  Past Medical History:   Diagnosis Date    CKD (chronic kidney disease) stage 4, GFR 15-29 ml/min     COPD (chronic obstructive pulmonary disease)     DVT (deep venous thrombosis) left leg post cholecystectomy    Hypercalcemia     Hypercalcemia     Hypertension     Hypokalemia     Hypothyroidism     Kidney stone     Thyroid disease     Urinary tract infection     Vaginal infection     Vitamin D deficiency      Past Surgical History:  Past Surgical History:   Procedure Laterality Date    APPENDECTOMY      BREAST SURGERY      biopsy only x 6    CARPAL TUNNEL RELEASE Bilateral     CHOLECYSTECTOMY      CYSTOSCOPY      CYSTOSCOPY WITH CYSTOGRAPHY N/A 4/4/2022    Procedure: CYSTOSCOPY, WITH CYSTOGRAM;  Surgeon: Ashu Riley MD;  Location: [facility];  Service: Urology;  Laterality: N/A;    CYSTOSCOPY WITH URODYNAMIC TESTING N/A 4/18/2022    Procedure: CYSTOSCOPY, WITH URODYNAMIC TESTING FLOUROSCOPIC;  Surgeon: Trae Em MD;  Location: [address];  Service: Urology;  Laterality: N/A;  1hr    CHARI FILTER PLACEMENT      INSERTION OF URETERAL CATHETER N/A 4/4/2022    Procedure: INSERTION, CATHETER, URETER;  Surgeon: Ashu Riley MD;  Location: [facility] OR;  Service: Urology;  Laterality: N/A;    INSERTION OF URETERAL CATHETER  4/4/2022    Procedure: ;  Surgeon: Ashu Riley MD;  Location: [facility] OR;  Service: Urology;;    RETROGRADE PYELOGRAPHY Bilateral 4/4/2022    Procedure: PYELOGRAM, RETROGRADE;   Surgeon: Ashu Riley MD;  Location: [facility] OR;  Service: Urology;  Laterality: Bilateral;    URETEROSCOPY Left 4/4/2022    Procedure: URETEROSCOPY;  Surgeon: Ashu Riley MD;  Location: [facility] OR;  Service: Urology;  Laterality: Left;     Allergies:  Review of patient's allergies indicates:   Allergen Reactions    Macrobid [nitrofurantoin monohyd/m-cryst] Swelling    Sulfa (sulfonamide antibiotics) Anaphylaxis    Asa [aspirin] Other (See Comments)    Codeine Nausea Only     Home Medications:  Current Outpatient Medications   Medication Instructions    acetaminophen (TYLENOL) 500 mg, Oral, Every 6 hours PRN    albuterol (PROVENTIL/VENTOLIN HFA) 90 mcg/actuation inhaler 2 puffs, Inhalation, Every 6 hours PRN    beta-carotene,A,-vits C,E/mins (OCUVITE ORAL) 1 tablet, Oral, Daily    clotrimazole-betamethasone 1-0.05% (LOTRISONE) cream 2 times daily, to affected area    diphenoxylate-atropine 2.5-0.025 mg (LOMOTIL) 2.5-0.025 mg per tablet No dose, route, or frequency recorded.    ergocalciferol, vitamin D2, (VITAMIN D ORAL) 25 mcg, Oral, Daily    folic acid (FOLVITE) 800 mcg, Oral, Daily    furosemide (LASIX) 40 mg, Oral, 2 times daily    gabapentin (NEURONTIN) 100 mg, Oral, 2 times daily    hydroCHLOROthiazide (HYDRODIURIL) 25 mg, Oral, Daily    levothyroxine (SYNTHROID) 125 mcg, Oral, Before breakfast    lisinopriL (PRINIVIL,ZESTRIL) 40 mg, Oral, Daily    metoprolol succinate (TOPROL-XL) 25 mg, Oral, Daily    multivit with calcium,iron,min (WOMEN'S DAILY FORMULA ORAL) 1 tablet, Oral, Daily    mupirocin (BACTROBAN) 2 % ointment APPLY CREAM TO AFFECTED AREA THREE TIMES DAILY    mv,calcium,min/iron/folic/vitK (ONE-A-DAY WOMEN'S COMPLETE ORAL) Oral    nystatin-triamcinolone (MYCOLOG) ointment APPLY OINTMENT TOPICALLY TO AFFECTED AREA TWICE DAILY    potassium chloride (MICRO-K) 10 MEQ CpSR No dose, route, or frequency recorded.    promethazine (PHENERGAN) 25 mg, Oral, Every 4 hours    tolterodine (DETROL) 2 MG  Tab No dose, route, or frequency recorded.    TRELEGY ELLIPTA 200-62.5-25 mcg inhaler No dose, route, or frequency recorded.     Family History:  Family History   Problem Relation Age of Onset    No Known Problems Mother     No Known Problems Father     Kidney disease Neg Hx      Social History:  Social History     Tobacco Use    Smoking status: Former     Types: Cigarettes     Quit date: 1996     Years since quittin.2    Smokeless tobacco: Never   Substance Use Topics    Alcohol use: No     Alcohol/week: 0.0 standard drinks    Drug use: No     Review of Systems:  Review of Systems   Constitutional:  Positive for chills, malaise/fatigue and weight loss. Negative for fever.   HENT:  Negative for congestion, sinus pain and sore throat.    Respiratory:  Positive for cough and shortness of breath. Negative for sputum production.    Gastrointestinal:  Positive for diarrhea. Negative for abdominal pain, blood in stool, constipation, heartburn, melena, nausea and vomiting.   Genitourinary:  Negative for dysuria.   Musculoskeletal:  Negative for myalgias.   Skin:  Negative for itching and rash.   Neurological:  Positive for headaches. Negative for dizziness and focal weakness.   Psychiatric/Behavioral:  The patient is not nervous/anxious and does not have insomnia.    All other systems reviewed and are negative.  Health Care Maintenance :   Primary Care Physician: Pola Knox MD   Immunizations:   Immunization History   Administered Date(s) Administered    COVID-19, MRNA, LN-S, PF (Pfizer) (Purple Cap) 2021, 2021, 2021    COVID-19, mRNA, LNP-S, bivalent booster, PF (PFIZER OMICRON) 10/19/2022    DTaP 2021    Influenza - High Dose - PF (65 years and older) 2016, 2017    Influenza - Quadrivalent 10/04/2019, 10/08/2021    Pneumococcal Conjugate - 13 Valent 2016      Cancer Screening:  PAP: Aged out  MMG: needs  Colonoscopy: needs, only has cologuard from  "2023    Objective:   Last 24 Hour Vital Signs:  BP  Min: 92/53  Max: 140/67  Temp  Av.5 °F (36.4 °C)  Min: 97.5 °F (36.4 °C)  Max: 97.5 °F (36.4 °C)  Pulse  Av.1  Min: 66  Max: 78  Resp  Av  Min: 18  Max: 20  SpO2  Av.4 %  Min: 94 %  Max: 98 %  Height  Av' 1" (154.9 cm)  Min: 5' 1" (154.9 cm)  Max: 5' 1" (154.9 cm)  Weight  Av.8 kg (187 lb)  Min: 84.8 kg (187 lb)  Max: 84.8 kg (187 lb)  Body mass index is 35.33 kg/m².  No intake/output data recorded.  Physical Examination:  Physical Exam   Constitutional: She is oriented to person, place, and time. She appears obese.   HENT:   Head: Normocephalic.   Right Ear: External ear normal.   Left Ear: External ear normal.   Nose: Rhinorrhea present.   Mouth/Throat: Mucous membranes are moist. Oropharynx is clear.   Eyes: Pupils are equal, round, and reactive to light.   Cardiovascular: Normal rate, regular rhythm, normal heart sounds and normal pulses. Exam reveals no gallop.   No murmur heard.Pulmonary:      Effort: Pulmonary effort is normal.      Breath sounds: Wheezing present. No rales.      Comments: Bilateral wheezes present diffusely     Abdominal: Soft. Bowel sounds are normal. She exhibits no distension. There is no abdominal tenderness.   Musculoskeletal:         General: Swelling present.      Comments: Chronic venous stasis appearance bilaterally with leg swelling, redness, skin thickening; compression stockings in place   Neurological: She is alert and oriented to person, place, and time. No cranial nerve deficit or sensory deficit.   Skin: Capillary refill takes less than 2 seconds. No rash noted. No erythema.   Psychiatric: Her behavior is normal. Mood normal.    Laboratory:  Recent Labs   Lab 23  1059   WBC 9.16   HGB 12.2   HCT 37.3      MCV 93   RDW 13.0      K 5.9*      CO2 19*   *   CREATININE 4.8*   GLU 98   PROT 7.8   ALBUMIN 3.2*   BILITOT 0.4   AST 22   ALKPHOS 118   ALT 19     Cardiac " Data:  Recent Labs   Lab 04/05/23  1508   BNP 65     Microbiology:  Microbiology Results (last 7 days)       Procedure Component Value Units Date/Time    Blood culture [[id number]] Collected: 04/05/23 1508    Order Status: Sent Specimen: Blood from Peripheral, Forearm, Right Updated: 04/05/23 1509    Blood culture [[id number]] Collected: 04/05/23 1508    Order Status: Sent Specimen: Blood from Peripheral, Forearm, Right Updated: 04/05/23 1509    Urine culture [[id number]] Collected: 04/05/23 1229    Order Status: No result Specimen: Urine Updated: 04/05/23 1320             Recent Labs   Lab 04/05/23  1229   COLORU Colorless*   SPECGRAV 1.010   PHUR 5.0   PROTEINUA Negative   BACTERIA Few*   NITRITE Negative   LEUKOCYTESUR 1+*   UROBILINOGEN Negative   HYALINECASTS 8*       Other Results:  EKG (my interpretation): sinus elizabeth    Radiology:  X-Ray Chest AP Portable    (Results Pending)       Assessment:     Socorro Lucas is a 72 y.o. female with an extensive PMHX presenting with increasing fatigue and generalized weakness for ~2weeks in the setting of COVID-19 infection diagnosed on 03/22/23. She was also found to elevated Cr on admit labs c/w MINDI on CKD-IV. IVF resuscitation initiated and urine studies ordered. CXR in process and blood cultures ordered. Etiology of weakness likely 2/2 deconditioning 2/2 COVID-19 and decreased mobility 2/2 lower extremity venous stasis. MINDI likely 2/2 decreased PO intake.     Plan:     Acute Kidney Injury on CKD IV  #hyperkalemia  - Patient with Cr 4.8 /, K 5.9 on admit labs (baseline Cr ~1.0-1.3) in the setting of CKD-IV   - Reports decreased PO intake for the past ~2weeks in the setting of COVID-19  - BUN/Cr of 26:1---> likely pre-renal etiology from dehydration  - K shifted in ED  Plan:  - Urine studies/electrolytes ordered  - Mg and Phos ordered, trend daily  - IVF resuscitation  - Avoid nephrotoxic agents  - Strict I/O  - Daily CMP      COVID-19  #deconditioning  "#generalizedweakness #cough  - Patient with positive COVID-19 diagnosis on 3/22/23 with symptoms of cough, generalized weakness/fatigue, decreased PO intake--->treated outpatient with cough suppressant, corticosteroid injection, azithromycin  - Patient reports that cough is mildly improved (has cough at baseline from COPD) but that her fatigue,generalized weakness, decreased PO intake have not improved since her initial diagnosis   - Admit vitals with spO2 94-98% on 2L NC  - On physical exam, bilateral diffuse wheezes are present  - Repeat rapid COVID swab negative  Plan:  - CXR ordered  - Blood Cultures ordered  - Lactic acid ordered   - CK ordered  - Duonebs q4 ordered     COPD  - Patient with hx of COPD  - Home medications include: albuterol, trellegy   - No home O2 use  - Last pulmonology evaluation unclear, no PFTs found  - On 2L NC in ED, sating @ 94-98%  Plan:  - Duonebs as ordered above  - Holding home trellegy, not on formulary  - Follow up outpatient with pulm     Anemia of Chronic Disease  - Patient with admit H/H 12.2/37.3 in the setting of documented hx of anemia of chronic disease likely 2/2 CKD  - Baseline Hgb appears to be ~12-13  Plan:  - B12, Iron, Folate, TSH ordered      Urinary Tract Infection  Hx of Recurrent UTI  #chronicnephrolithiasis #uretalreflux   - Patient with hx of chronic UTI, kidney stones, uretal reflux that required placement of chronic in-dwelling catheter  - Follows with Dr. Riley (Urology) with regular catheter changes and evaluation  - No acute issues/concerns at this time  - UA microscopic with 20 WBC, Few bacteria  - Urinalysis LE 1+  Plan:  - Urine culture and sensitivities pending  - Follow up outpatient with Urology as scheduled     Hx of DVT  - Patient reports hx of blood clot in left lower extremity "many years" ago  - Not on home anticoagulation  - Residual tenderness to L calf on exam that patient states has been present since her initial diagnosis   Plan:  - VTE " "prophylaxis while inpatient with heparin    Chronic Venous Insufficiency/Stasis of Lower Extremities  - Patient with hx of venous stasis in lower extremities with significant bilateral edema  - Previously worked up outpatient with Cardiology ([facility] 08/22)---> compression stockings unsuccessful, started on lasix 40mg BID, Ultrasound of LE ordered but never completed   - Physical exam on admit with lower extremity swelling, thickening, redness, water blebs, compression stockings in place  - BNP 68 on admit, supports venous insufficiency as opposed to cardiac origin   Plan:  - Holding lasix in setting of MINDI  - Follow up outpatient for  JESI    Hypertension  - Patient with hx of longstanding HTN  - On home  HCTZ 25 daily, metoprolol succinate 25mg daily, lisinopril 40mg daily  - SBP subjectively in 80s morning of admission-->/53-74 in ED  Plan:  - Holding BP medications in setting of soft pressures    CAD w/ insertion of stent  - Patient with subjective hx of CAD with stenting (unknown time, states was many years ago)  - Prior XR films with noted atherosclerosis of aorta   - No active chest pain   - Admit EKG with sinus elizabeth, low voltage  - Patient follows with Cardiology ([facility] 08/22 (see above under venous stasis)  - TTE on 07/2021 with LVEF 60%, normal LV size, no valvular disease  Plan:  - Lipid panel  - Repeat TTE ordered    Hypothyroidism  - Patient with hx of hypothyroidism for "many years"  - On home synthroid 125mcg daily  - No symptoms at this time  Plan:  - Continue synthroid         DVT Ppx: Hep  Diet: No diet orders on file  Code: No Order    Disposition: pending resolution/improvement of MINDI    Rome Weaver M.D.  [facility] Neurology PGY-1    [facility] Medicine Hospitalist Pager numbers:   [facility] Hospitalist Medicine Team A (Fadia/Christian): [phone]  [facility] Hospitalist Medicine Team B (Sara/Peter):  [phone]    "

## 2023-04-05 NOTE — FIRST PROVIDER EVALUATION
Emergency Department TeleTriage Encounter Note      CHIEF COMPLAINT    Chief Complaint   Patient presents with    COVID-19 Concerns     Pt reports she was diagnosed with covid on 3/22 and has continued with cough, runny nose, diarrhea and weakness. Pt went to see Dr Tavares for a catheter change this morning and her BP was low in the office. They suggested she come to the ER for further eval and treatment. Pt has had decreased intake due to low appetite.        VITAL SIGNS   Initial Vitals [04/05/23 1005]   BP Pulse Resp Temp SpO2   (!) 140/67 67 18 97.5 °F (36.4 °C) (!) 94 %      MAP       --            ALLERGIES    Review of patient's allergies indicates:   Allergen Reactions    Macrobid [nitrofurantoin monohyd/m-cryst] Swelling    Sulfa (sulfonamide antibiotics) Anaphylaxis    Asa [aspirin] Other (See Comments)    Codeine Nausea Only       PROVIDER TRIAGE NOTE  This is a teletriage evaluation of a 72 y.o. female presenting to the ED with c/o weakness.  Pt states she was diagnosed with Covid on 3/22 and symptoms have worsened.  Pt had a chest x-ray this morning and had her catheter changed.  Pt was advised by Urologist to come to the ED for evaluation.    PE: Weak appearing.  VSS.  Speakoing in full sentences.      Plan: labs, monitor    Limited physical exam via telehealth: The patient is awake, alert, answering questions appropriately and is not in respiratory distress. All ED beds are full at present; patient notified of this status.  Patient seen and medically screened by Nurse Practitioner via teletriage.      Initial orders will be placed and care will be transferred to an alternate provider when patient is roomed for a full evaluation. Any additional orders and the final disposition will be determined by that provider.         ORDERS  Labs Reviewed   CBC W/ AUTO DIFFERENTIAL   COMPREHENSIVE METABOLIC PANEL   MAGNESIUM   URINALYSIS, REFLEX TO URINE CULTURE       ED Orders (720h ago, onward)      Start  Ordered     Status Ordering Provider    04/05/23 1012 04/05/23 1012  CBC auto differential  STAT         Ordered DHEERAJ MUHAMMAD    04/05/23 1012 04/05/23 1012  Comprehensive metabolic panel  STAT         Ordered DHEERAJ MUHAMMAD    04/05/23 1012 04/05/23 1012  Magnesium  STAT         Ordered DHEERAJ MUHAMMAD    04/05/23 1012 04/05/23 1012  Urinalysis, Reflex to Urine Culture Urine, Clean Catch  STAT         Ordered DHEERAJ MUHAMMAD    04/05/23 1012 04/05/23 1012  Insert Saline lock IV  Once         Ordered DHEERAJ MUHAMMAD              Virtual Visit Note: The provider triage portion of this emergency department evaluation and documentation was performed via [employer] digital, a HIPAA-compliant telemedicine application, in concert with a tele-presenter in the room. A face to face patient evaluation with one of my colleagues will occur once the patient is placed in an emergency department room.      DISCLAIMER: This note was prepared with [employer] voice recognition transcription software. Garbled syntax, mangled pronouns, and other bizarre constructions may be attributed to that software system.

## 2023-04-05 NOTE — ED PROVIDER NOTES
NAME:  Socorro Lucas  CSN:     [id number]  MRN:    [id number]  ADMIT DATE: 4/5/2023        eMERGENCY dEPARTMENT eNCOUnter    CHIEF COMPLAINT    Chief Complaint   Patient presents with    COVID-19 Concerns     Pt reports she was diagnosed with covid on 3/22 and has continued with cough, runny nose, diarrhea and weakness. Pt went to see Dr Tavares for a catheter change this morning and her BP was low in the office. They suggested she come to the ER for further eval and treatment. Pt has had decreased intake due to low appetite.        HPI      Socorro Lucas is a 72 y.o. female who presents to the ED for generalized weakness.  Patient had COVID a proximally 2-3 weeks ago.  Since that time she is been suffering from diarrhea and worsening generalized weakness.  Patient was sent down from her urologist's office where she went for Rodriguez catheter change this morning.  She also complains of decreased p.o. intake.          ALLERGIES    Review of patient's allergies indicates:   Allergen Reactions    Macrobid [nitrofurantoin monohyd/m-cryst] Swelling    Sulfa (sulfonamide antibiotics) Anaphylaxis    Asa [aspirin] Other (See Comments)    Codeine Nausea Only       PAST MEDICAL HISTORY  Past Medical History:   Diagnosis Date    CKD (chronic kidney disease) stage 4, GFR 15-29 ml/min     COPD (chronic obstructive pulmonary disease)     DVT (deep venous thrombosis) left leg post cholecystectomy    Hypercalcemia     Hypercalcemia     Hypertension     Hypokalemia     Hypothyroidism     Kidney stone     Thyroid disease     Urinary tract infection     Vaginal infection     Vitamin D deficiency        SURGICAL HISTORY    Past Surgical History:   Procedure Laterality Date    APPENDECTOMY      BREAST SURGERY      biopsy only x 6    CARPAL TUNNEL RELEASE Bilateral     CHOLECYSTECTOMY      CYSTOSCOPY      CYSTOSCOPY WITH CYSTOGRAPHY N/A 4/4/2022    Procedure: CYSTOSCOPY, WITH CYSTOGRAM;  Surgeon: Ashu Riley MD;  Location: [facility]  OR;  Service: Urology;  Laterality: N/A;    CYSTOSCOPY WITH URODYNAMIC TESTING N/A 2022    Procedure: CYSTOSCOPY, WITH URODYNAMIC TESTING FLOUROSCOPIC;  Surgeon: Trae Em MD;  [address];  Service: Urology;  Laterality: N/A;  1hr    CHARI FILTER PLACEMENT      INSERTION OF URETERAL CATHETER N/A 2022    Procedure: INSERTION, CATHETER, URETER;  Surgeon: Ashu Riley MD;  Location: [facility];  Service: Urology;  Laterality: N/A;    INSERTION OF URETERAL CATHETER  2022    Procedure: ;  Surgeon: Ashu Riley MD;  Location: [facility] OR;  Service: Urology;;    RETROGRADE PYELOGRAPHY Bilateral 2022    Procedure: PYELOGRAM, RETROGRADE;  Surgeon: Ashu Riley MD;  Location: [facility] OR;  Service: Urology;  Laterality: Bilateral;    URETEROSCOPY Left 2022    Procedure: URETEROSCOPY;  Surgeon: Ashu Riley MD;  Location: [facility];  Service: Urology;  Laterality: Left;       SOCIAL HISTORY    Social History     Socioeconomic History    Marital status: Single   Tobacco Use    Smoking status: Former     Types: Cigarettes     Quit date: 1996     Years since quittin.2    Smokeless tobacco: Never   Substance and Sexual Activity    Alcohol use: No     Alcohol/week: 0.0 standard drinks    Drug use: No    Sexual activity: Not Currently       FAMILY HISTORY    Family History   Problem Relation Age of Onset    No Known Problems Mother     No Known Problems Father     Kidney disease Neg Hx        REVIEW OF SYSTEMS   ROS  All Systems otherwise negative except as noted in the History of Present Illness.        PHYSICAL EXAM    Reviewed Triage Note  VITAL SIGNS:   ED Triage Vitals [23 1005]   Enc Vitals Group      BP (!) 140/67      Pulse 67      Resp 18      Temp 97.5 °F (36.4 °C)      Temp Source Oral      SpO2 (!) 94 %      Weight 187 lb      Height       Head Circumference       Peak Flow       Pain Score       Pain Loc       Pain Edu?       Excl. in GC?         Patient Vitals for the past 24 hrs:   BP Temp Temp src Pulse Resp SpO2 Weight   04/05/23 1410 -- -- -- -- -- 95 % --   04/05/23 1400 118/74 -- -- 78 -- -- --   04/05/23 1200 (!) 120/58 -- -- 66 -- -- --   04/05/23 1130 -- -- -- 66 -- 95 % --   04/05/23 1115 -- -- -- 66 -- 95 % --   04/05/23 1110 (!) 121/57 -- -- 69 -- -- --   04/05/23 1005 (!) 140/67 97.5 °F (36.4 °C) Oral 67 18 (!) 94 % 84.8 kg (187 lb)           Physical Exam    Constitutional:  Well-developed, well-nourished. No acute distress  HENT:  Normocephalic, atraumatic.  Dry mucous membranes  Eyes:  EOMI. Conjunctiva normal without discharge.   Neck: Normal range of motion.No stridor. No meningismus.   Respiratory:  Normal breath sounds bilaterally.  No respiratory distress, retractions, or conversational dyspnea. No wheezing. No rhonchi. No rales.   Cardiovascular:  Normal heart rate. Normal rhythm. No pitting lower extremity edema.   GI:  Abdomen soft, non-distended, non-tender. Normal bowel sounds. No guarding, rigidity or rebound.    : No CVA tenderness.   Musculoskeletal:  No gross deformity or limited range of motion of all major joints. No palpable bony deformity. No tenderness to palpation.  Integument:  Warm and dry. No rash.  Neurologic:  Normal motor function. Normal sensory function. No focal deficits noted. Alert and Interactive.  Psychiatric:  Affect normal. Mood normal.         LABS  Pertinent labs reviewed. (See chart for details)   Labs Reviewed   CBC W/ AUTO DIFFERENTIAL - Abnormal; Notable for the following components:       Result Value    Immature Granulocytes 1.7 (*)     Immature Grans (Abs) 0.16 (*)     Gran % 73.4 (*)     Lymph % 13.9 (*)     All other components within normal limits   COMPREHENSIVE METABOLIC PANEL - Abnormal; Notable for the following components:    Potassium 5.9 (*)     CO2 19 (*)      (*)     Creatinine 4.8 (*)     Albumin 3.2 (*)     eGFR 9 (*)     All other components within normal limits    URINALYSIS, REFLEX TO URINE CULTURE - Abnormal; Notable for the following components:    Color, UA Colorless (*)     Leukocytes, UA 1+ (*)     All other components within normal limits    Narrative:     Specimen Source->Urine   URINALYSIS MICROSCOPIC - Abnormal; Notable for the following components:    WBC, UA 20 (*)     Bacteria Few (*)     Yeast, UA Rare (*)     Hyaline Casts, UA 8 (*)     All other components within normal limits    Narrative:     Specimen Source->Urine   CULTURE, URINE   MAGNESIUM   CREATININE, URINE, RANDOM    Narrative:     Specimen Source->Urine   SODIUM, URINE, RANDOM    Narrative:     Specimen Source->Urine   UREA NITROGEN, URINE, RANDOM    Narrative:     Specimen Source->Urine   SARS-COV-2 RNA AMPLIFICATION, QUAL   POCT GLUCOSE   POCT GLUCOSE MONITORING CONTINUOUS         RADIOLOGY    Imaging Results              X-Ray Chest AP Portable (In process)                     PROCEDURES    Procedures      EKG     Interpreted by ERP:          ED COURSE & MEDICAL DECISION MAKING    Pertinent & Imaging studies reviewed. (See chart for details and specific orders.)        Medications   sodium zirconium cyclosilicate packet 10 g (has no administration in time range)   albuterol nebulizer solution 10 mg (has no administration in time range)   sodium bicarbonate 150 mEq in dextrose 5 % (D5W) 1,000 mL infusion (has no administration in time range)   sodium chloride 0.9% bolus 1,000 mL 1,000 mL (0 mLs Intravenous Stopped 4/5/23 1310)          Pt found to have quirino. Will admit to medicine for further management       DISPOSITION  Patient admitted in stable condition at No discharge date for patient encounter.        FINAL IMPRESSION    1. Acute renal failure, unspecified acute renal failure type    2. Palpitations    3. Dehydration              Critical care time spent with this patient (not including separately billable items) was  0 minutes.     DISCLAIMER: This note was prepared with Dragon  NaturallySpeaking voice recognition transcription software. Garbled syntax, mangled pronouns, and other bizarre constructions may be attributed to that software system.      Petr Londono MD  04/05/2023  2:08 PM           Petr Londono MD  04/05/23 6447

## 2023-04-06 LAB
ALBUMIN SERPL BCP-MCNC: 2.5 G/DL (ref 3.5–5.2)
ALP SERPL-CCNC: 91 U/L (ref 55–135)
ALT SERPL W/O P-5'-P-CCNC: 13 U/L (ref 10–44)
ANION GAP SERPL CALC-SCNC: 11 MMOL/L (ref 8–16)
ANION GAP SERPL CALC-SCNC: 11 MMOL/L (ref 8–16)
AORTIC ROOT ANNULUS: 2.94 CM
AORTIC VALVE CUSP SEPERATION: 1.59 CM
AST SERPL-CCNC: 20 U/L (ref 10–40)
AV INDEX (PROSTH): 0.98
AV MEAN GRADIENT: 4 MMHG
AV PEAK GRADIENT: 9 MMHG
AV VALVE AREA: 3.13 CM2
AV VELOCITY RATIO: 1.02
BASOPHILS # BLD AUTO: 0.03 K/UL (ref 0–0.2)
BASOPHILS NFR BLD: 0.5 % (ref 0–1.9)
BILIRUB SERPL-MCNC: 0.6 MG/DL (ref 0.1–1)
BSA FOR ECHO PROCEDURE: 1.91 M2
BUN SERPL-MCNC: 107 MG/DL (ref 8–23)
BUN SERPL-MCNC: 77 MG/DL (ref 8–23)
CALCIUM SERPL-MCNC: 8.3 MG/DL (ref 8.7–10.5)
CALCIUM SERPL-MCNC: 8.5 MG/DL (ref 8.7–10.5)
CHLORIDE SERPL-SCNC: 108 MMOL/L (ref 95–110)
CHLORIDE SERPL-SCNC: 111 MMOL/L (ref 95–110)
CO2 SERPL-SCNC: 21 MMOL/L (ref 23–29)
CO2 SERPL-SCNC: 22 MMOL/L (ref 23–29)
CREAT SERPL-MCNC: 2.1 MG/DL (ref 0.5–1.4)
CREAT SERPL-MCNC: 3.1 MG/DL (ref 0.5–1.4)
CV ECHO LV RWT: 0.39 CM
DIFFERENTIAL METHOD: ABNORMAL
DOP CALC AO PEAK VEL: 1.49 M/S
DOP CALC AO VTI: 33.2 CM
DOP CALC LVOT AREA: 3.2 CM2
DOP CALC LVOT DIAMETER: 2.02 CM
DOP CALC LVOT PEAK VEL: 1.52 M/S
DOP CALC LVOT STROKE VOLUME: 103.78 CM3
DOP CALC MV VTI: 37.9 CM
DOP CALCLVOT PEAK VEL VTI: 32.4 CM
E WAVE DECELERATION TIME: 228.34 MSEC
E/A RATIO: 0.98
E/E' RATIO: 11.78 M/S
ECHO LV POSTERIOR WALL: 0.9 CM (ref 0.6–1.1)
EJECTION FRACTION: 60 %
EOSINOPHIL # BLD AUTO: 0.1 K/UL (ref 0–0.5)
EOSINOPHIL NFR BLD: 1.8 % (ref 0–8)
EOSINOPHIL URNS QL WRIGHT STN: NORMAL
ERYTHROCYTE [DISTWIDTH] IN BLOOD BY AUTOMATED COUNT: 13.2 % (ref 11.5–14.5)
EST. GFR  (NO RACE VARIABLE): 15 ML/MIN/1.73 M^2
EST. GFR  (NO RACE VARIABLE): 25 ML/MIN/1.73 M^2
FRACTIONAL SHORTENING: 65 % (ref 28–44)
GLUCOSE SERPL-MCNC: 83 MG/DL (ref 70–110)
GLUCOSE SERPL-MCNC: 86 MG/DL (ref 70–110)
HCT VFR BLD AUTO: 30.9 % (ref 37–48.5)
HGB BLD-MCNC: 10 G/DL (ref 12–16)
IMM GRANULOCYTES # BLD AUTO: 0.09 K/UL (ref 0–0.04)
IMM GRANULOCYTES NFR BLD AUTO: 1.4 % (ref 0–0.5)
INTERVENTRICULAR SEPTUM: 0.98 CM (ref 0.6–1.1)
IRON SERPL-MCNC: 34 UG/DL (ref 30–160)
IVRT: 49.48 MSEC
LA MAJOR: 5.12 CM
LA MINOR: 5.3 CM
LA WIDTH: 3.5 CM
LEFT ATRIUM SIZE: 3.3 CM
LEFT ATRIUM VOLUME INDEX MOD: 25.9 ML/M2
LEFT ATRIUM VOLUME INDEX: 27.9 ML/M2
LEFT ATRIUM VOLUME MOD: 47.43 CM3
LEFT ATRIUM VOLUME: 51.13 CM3
LEFT INTERNAL DIMENSION IN SYSTOLE: 1.6 CM (ref 2.1–4)
LEFT VENTRICLE DIASTOLIC VOLUME INDEX: 67.78 ML/M2
LEFT VENTRICLE DIASTOLIC VOLUME: 124.03 ML
LEFT VENTRICLE MASS INDEX: 80 G/M2
LEFT VENTRICLE SYSTOLIC VOLUME INDEX: 34.4 ML/M2
LEFT VENTRICLE SYSTOLIC VOLUME: 62.92 ML
LEFT VENTRICULAR INTERNAL DIMENSION IN DIASTOLE: 4.6 CM (ref 3.5–6)
LEFT VENTRICULAR MASS: 146 G
LV LATERAL E/E' RATIO: 10.6 M/S
LV SEPTAL E/E' RATIO: 13.25 M/S
LVOT MG: 3.91 MMHG
LVOT MV: 0.88 CM/S
LYMPHOCYTES # BLD AUTO: 1.2 K/UL (ref 1–4.8)
LYMPHOCYTES NFR BLD: 19.9 % (ref 18–48)
MAGNESIUM SERPL-MCNC: 1.9 MG/DL (ref 1.6–2.6)
MCH RBC QN AUTO: 30.5 PG (ref 27–31)
MCHC RBC AUTO-ENTMCNC: 32.4 G/DL (ref 32–36)
MCV RBC AUTO: 94 FL (ref 82–98)
MONOCYTES # BLD AUTO: 0.7 K/UL (ref 0.3–1)
MONOCYTES NFR BLD: 10.8 % (ref 4–15)
MV MEAN GRADIENT: 2 MMHG
MV PEAK A VEL: 1.08 M/S
MV PEAK E VEL: 1.06 M/S
MV PEAK GRADIENT: 5 MMHG
MV STENOSIS PRESSURE HALF TIME: 66.22 MS
MV VALVE AREA BY CONTINUITY EQUATION: 2.74 CM2
MV VALVE AREA P 1/2 METHOD: 3.32 CM2
NEUTROPHILS # BLD AUTO: 4.1 K/UL (ref 1.8–7.7)
NEUTROPHILS NFR BLD: 65.6 % (ref 38–73)
NRBC BLD-RTO: 0 /100 WBC
PHOSPHATE SERPL-MCNC: 5.4 MG/DL (ref 2.7–4.5)
PISA TR MAX VEL: 2.92 M/S
PLATELET # BLD AUTO: 217 K/UL (ref 150–450)
PMV BLD AUTO: 10.4 FL (ref 9.2–12.9)
POCT GLUCOSE: 90 MG/DL (ref 70–110)
POTASSIUM SERPL-SCNC: 4.9 MMOL/L (ref 3.5–5.1)
POTASSIUM SERPL-SCNC: 5.5 MMOL/L (ref 3.5–5.1)
PROT SERPL-MCNC: 6.1 G/DL (ref 6–8.4)
PULM VEIN S/D RATIO: 1.21
PV MV: 0.81 M/S
PV PEAK D VEL: 0.56 M/S
PV PEAK S VEL: 0.68 M/S
PV PEAK VELOCITY: 1.17 CM/S
RA MAJOR: 5.2 CM
RA PRESSURE: 3 MMHG
RA WIDTH: 3.5 CM
RBC # BLD AUTO: 3.28 M/UL (ref 4–5.4)
RIGHT VENTRICULAR END-DIASTOLIC DIMENSION: 2.73 CM
RIGHT VENTRICULAR LENGTH IN DIASTOLE (APICAL 4-CHAMBER VIEW): 4.9 CM
SATURATED IRON: 16 % (ref 20–50)
SODIUM SERPL-SCNC: 141 MMOL/L (ref 136–145)
SODIUM SERPL-SCNC: 143 MMOL/L (ref 136–145)
TDI LATERAL: 0.1 M/S
TDI SEPTAL: 0.08 M/S
TDI: 0.09 M/S
TOTAL IRON BINDING CAPACITY: 209 UG/DL (ref 250–450)
TR MAX PG: 34 MMHG
TRANSFERRIN SERPL-MCNC: 141 MG/DL (ref 200–375)
TRICUSPID ANNULAR PLANE SYSTOLIC EXCURSION: 1.8 CM
TV REST PULMONARY ARTERY PRESSURE: 37 MMHG
WBC # BLD AUTO: 6.22 K/UL (ref 3.9–12.7)

## 2023-04-06 PROCEDURE — 27000221 HC OXYGEN, UP TO 24 HOURS

## 2023-04-06 PROCEDURE — 25000003 PHARM REV CODE 250: Performed by: STUDENT IN AN ORGANIZED HEALTH CARE EDUCATION/TRAINING PROGRAM

## 2023-04-06 PROCEDURE — 36415 COLL VENOUS BLD VENIPUNCTURE: CPT | Performed by: STUDENT IN AN ORGANIZED HEALTH CARE EDUCATION/TRAINING PROGRAM

## 2023-04-06 PROCEDURE — 85025 COMPLETE CBC W/AUTO DIFF WBC: CPT | Performed by: STUDENT IN AN ORGANIZED HEALTH CARE EDUCATION/TRAINING PROGRAM

## 2023-04-06 PROCEDURE — 80048 BASIC METABOLIC PNL TOTAL CA: CPT | Mod: XB | Performed by: STUDENT IN AN ORGANIZED HEALTH CARE EDUCATION/TRAINING PROGRAM

## 2023-04-06 PROCEDURE — 63600175 PHARM REV CODE 636 W HCPCS: Performed by: STUDENT IN AN ORGANIZED HEALTH CARE EDUCATION/TRAINING PROGRAM

## 2023-04-06 PROCEDURE — 80053 COMPREHEN METABOLIC PANEL: CPT | Performed by: STUDENT IN AN ORGANIZED HEALTH CARE EDUCATION/TRAINING PROGRAM

## 2023-04-06 PROCEDURE — 94761 N-INVAS EAR/PLS OXIMETRY MLT: CPT

## 2023-04-06 PROCEDURE — 94640 AIRWAY INHALATION TREATMENT: CPT

## 2023-04-06 PROCEDURE — 84100 ASSAY OF PHOSPHORUS: CPT | Performed by: STUDENT IN AN ORGANIZED HEALTH CARE EDUCATION/TRAINING PROGRAM

## 2023-04-06 PROCEDURE — 87205 SMEAR GRAM STAIN: CPT | Performed by: STUDENT IN AN ORGANIZED HEALTH CARE EDUCATION/TRAINING PROGRAM

## 2023-04-06 PROCEDURE — 25000242 PHARM REV CODE 250 ALT 637 W/ HCPCS: Performed by: STUDENT IN AN ORGANIZED HEALTH CARE EDUCATION/TRAINING PROGRAM

## 2023-04-06 PROCEDURE — 99900035 HC TECH TIME PER 15 MIN (STAT)

## 2023-04-06 PROCEDURE — 11000001 HC ACUTE MED/SURG PRIVATE ROOM

## 2023-04-06 PROCEDURE — 83735 ASSAY OF MAGNESIUM: CPT | Performed by: STUDENT IN AN ORGANIZED HEALTH CARE EDUCATION/TRAINING PROGRAM

## 2023-04-06 RX ORDER — MUPIROCIN 20 MG/G
OINTMENT TOPICAL 2 TIMES DAILY
Status: DISCONTINUED | OUTPATIENT
Start: 2023-04-07 | End: 2023-04-07 | Stop reason: HOSPADM

## 2023-04-06 RX ORDER — ACETAMINOPHEN 325 MG/1
650 TABLET ORAL EVERY 6 HOURS PRN
Status: DISCONTINUED | OUTPATIENT
Start: 2023-04-06 | End: 2023-04-07 | Stop reason: HOSPADM

## 2023-04-06 RX ORDER — GABAPENTIN 100 MG/1
200 CAPSULE ORAL NIGHTLY PRN
Status: DISCONTINUED | OUTPATIENT
Start: 2023-04-07 | End: 2023-04-07 | Stop reason: HOSPADM

## 2023-04-06 RX ORDER — IPRATROPIUM BROMIDE 0.5 MG/2.5ML
0.5 SOLUTION RESPIRATORY (INHALATION) EVERY 6 HOURS
Status: DISCONTINUED | OUTPATIENT
Start: 2023-04-06 | End: 2023-04-07 | Stop reason: HOSPADM

## 2023-04-06 RX ORDER — SODIUM CHLORIDE, SODIUM LACTATE, POTASSIUM CHLORIDE, CALCIUM CHLORIDE 600; 310; 30; 20 MG/100ML; MG/100ML; MG/100ML; MG/100ML
INJECTION, SOLUTION INTRAVENOUS CONTINUOUS
Status: DISCONTINUED | OUTPATIENT
Start: 2023-04-06 | End: 2023-04-07 | Stop reason: HOSPADM

## 2023-04-06 RX ADMIN — ALBUTEROL SULFATE 2.5 MG: 2.5 SOLUTION RESPIRATORY (INHALATION) at 08:04

## 2023-04-06 RX ADMIN — IPRATROPIUM BROMIDE 0.5 MG: 0.5 SOLUTION RESPIRATORY (INHALATION) at 08:04

## 2023-04-06 RX ADMIN — OXYBUTYNIN CHLORIDE 10 MG: 5 TABLET, EXTENDED RELEASE ORAL at 08:04

## 2023-04-06 RX ADMIN — ALBUTEROL SULFATE 2.5 MG: 2.5 SOLUTION RESPIRATORY (INHALATION) at 07:04

## 2023-04-06 RX ADMIN — LEVOTHYROXINE SODIUM 125 MCG: 25 TABLET ORAL at 05:04

## 2023-04-06 RX ADMIN — SODIUM ZIRCONIUM CYCLOSILICATE 10 G: 5 POWDER, FOR SUSPENSION ORAL at 08:04

## 2023-04-06 RX ADMIN — IPRATROPIUM BROMIDE 0.5 MG: 0.5 SOLUTION RESPIRATORY (INHALATION) at 07:04

## 2023-04-06 RX ADMIN — ACETAMINOPHEN 650 MG: 325 TABLET ORAL at 04:04

## 2023-04-06 RX ADMIN — ALBUTEROL SULFATE 2.5 MG: 2.5 SOLUTION RESPIRATORY (INHALATION) at 12:04

## 2023-04-06 RX ADMIN — SODIUM ZIRCONIUM CYCLOSILICATE 10 G: 5 POWDER, FOR SUSPENSION ORAL at 02:04

## 2023-04-06 RX ADMIN — IPRATROPIUM BROMIDE 0.5 MG: 0.5 SOLUTION RESPIRATORY (INHALATION) at 12:04

## 2023-04-06 RX ADMIN — ACETAMINOPHEN 650 MG: 325 TABLET ORAL at 05:04

## 2023-04-06 RX ADMIN — SODIUM CHLORIDE, SODIUM LACTATE, POTASSIUM CHLORIDE, AND CALCIUM CHLORIDE: .6; .31; .03; .02 INJECTION, SOLUTION INTRAVENOUS at 08:04

## 2023-04-06 NOTE — PROGRESS NOTES
04/05/23 2226   Admission   Initial VN Admission Questions Complete   Communication Issues? None   Shift   Virtual Nurse - Patient Verbalized Approval Of Camera Use   Safety/Activity   Patient Rounds bed in low position;placement of personal items at bedside;call light in patient/parent reach;visualized patient;clutter free environment maintained   Safety Promotion/Fall Prevention Fall Risk reviewed with patient/family;assistive device/personal item within reach;instructed to call staff for mobility;side rails raised x 2;medications reviewed   Positioning   Body Position position changed independently   Head of Bed (HOB) Positioning HOB elevated

## 2023-04-06 NOTE — PLAN OF CARE
Patient on oxygen with documented flow.  Will attempt to wean per O2 order protocol.  The proper method of use, as well as anticipated side effects, of this aerosol treatment are discussed and demonstrated to the patient.

## 2023-04-06 NOTE — ED NOTES
Received report from STEVEN Joseph; resuming care of pt. Pt resting comfortably in bed with visitor at bedside. Pt provided cup of ice, denies further needs at this time. Call light within reach, will continue to monitor.

## 2023-04-06 NOTE — PLAN OF CARE
Plan of care reviewed with patient.  Patient verbalized understanding and had no further questions.  Patient continues to receive IV fluids throughout the shift.  Patient chino catheter care completed.  Patient now resting comfortably in bed locked in lowest position, side rails up x3, and call bell in reach.  Will continue to monitor.      Problem: Adult Inpatient Plan of Care  Goal: Plan of Care Review  Outcome: Ongoing, Progressing  Goal: Patient-Specific Goal (Individualized)  Outcome: Ongoing, Progressing  Goal: Absence of Hospital-Acquired Illness or Injury  Outcome: Ongoing, Progressing  Goal: Optimal Comfort and Wellbeing  Outcome: Ongoing, Progressing  Goal: Readiness for Transition of Care  Outcome: Ongoing, Progressing     Problem: Fluid and Electrolyte Imbalance (Acute Kidney Injury/Impairment)  Goal: Fluid and Electrolyte Balance  Outcome: Ongoing, Progressing     Problem: Oral Intake Inadequate (Acute Kidney Injury/Impairment)  Goal: Optimal Nutrition Intake  Outcome: Ongoing, Progressing     Problem: Renal Function Impairment (Acute Kidney Injury/Impairment)  Goal: Effective Renal Function  Outcome: Ongoing, Progressing     Problem: COPD (Chronic Obstructive Pulmonary Disease) Comorbidity  Goal: Maintenance of COPD Symptom Control  Outcome: Ongoing, Progressing     Problem: Hypertension Comorbidity  Goal: Blood Pressure in Desired Range  Outcome: Ongoing, Progressing     Problem: Infection  Goal: Absence of Infection Signs and Symptoms  Outcome: Ongoing, Progressing     Problem: UTI (Urinary Tract Infection)  Goal: Improved Infection Symptoms  Outcome: Ongoing, Progressing     Problem: Electrolyte Imbalance  Goal: Electrolyte Balance  Outcome: Ongoing, Progressing     Problem: Fall Injury Risk  Goal: Absence of Fall and Fall-Related Injury  Outcome: Ongoing, Progressing     Problem: Gas Exchange Impaired  Goal: Optimal Gas Exchange  Outcome: Ongoing, Progressing     Problem: Fatigue  Goal: Improved  Activity Tolerance  Outcome: Ongoing, Progressing     Problem: Skin Injury Risk Increased  Goal: Skin Health and Integrity  Outcome: Ongoing, Progressing

## 2023-04-06 NOTE — NURSING
On-call MD notified of patient's b/p 99/43, ordered to continue monitoring patient, ordered followed.

## 2023-04-06 NOTE — PLAN OF CARE
Received from Emergency department on stable conditions via stretcher, sister in law Neena Diaz by her side. Orientation to room provided, care plan reviewed with patient, AAOx4, oxygen infusing via n/c. NSR per cardiac monitor, no red alarm noted. Denies any pain of discomfort, education provided on medication effect and side effect, voices understanding. Rodriguez catheter 16 french in place draining clear yellow urine, sacral decubitus noted, BM x1 small present, maintained clean and dry by staff. Call light within her reach, no apparent distress noted, bed in low position, bed alarm on, educated on the importance of calling as needed, voices understanding, stable at this time.     Problem: Adult Inpatient Plan of Care  Goal: Plan of Care Review  Outcome: Ongoing, Progressing  Goal: Patient-Specific Goal (Individualized)  Outcome: Ongoing, Progressing  Goal: Absence of Hospital-Acquired Illness or Injury  Outcome: Ongoing, Progressing  Goal: Optimal Comfort and Wellbeing  Outcome: Ongoing, Progressing  Goal: Readiness for Transition of Care  Outcome: Ongoing, Progressing     Problem: Renal Function Impairment (Acute Kidney Injury/Impairment)  Goal: Effective Renal Function  Outcome: Ongoing, Progressing     Problem: Fluid and Electrolyte Imbalance (Acute Kidney Injury/Impairment)  Goal: Fluid and Electrolyte Balance  Outcome: Ongoing, Progressing     Problem: Oral Intake Inadequate (Acute Kidney Injury/Impairment)  Goal: Optimal Nutrition Intake  Outcome: Ongoing, Progressing

## 2023-04-06 NOTE — PHARMACY MED REC
"  [facility]           Pharmacy  Admission Medication History     The home medication history was taken by Shirley Phelps.      Medication history obtained from Medications listed below were obtained from: [employer] software- [employer]    Based on information gathered for medication list, you may go to "Admission" then "Reconcile Home Medications" tabs to review and/or act upon those items.     The home medication list has been updated by the Pharmacy department.   Please read ALL comments highlighted in yellow.   Please address this information as you see fit.    Feel free to contact us if you have any questions or require assistance.    The medications listed below were removed from the home medication list.  Please reorder if appropriate:    Patient reports NOT TAKING the following medication(s):  Bactroban oint  Detrol 2mg  Phenergan 25mg        Current Facility-Administered Medications on File Prior to Encounter   Medication Dose Route Frequency Provider Last Rate Last Admin    acetaminophen tablet 1,000 mg  1,000 mg Oral On Call Procedure Ashu Riley MD         Current Outpatient Medications on File Prior to Encounter   Medication Sig Dispense Refill    acetaminophen (TYLENOL) 500 MG tablet Take 500 mg by mouth every 6 (six) hours as needed for Pain.      albuterol (PROVENTIL/VENTOLIN HFA) 90 mcg/actuation inhaler Inhale 2 puffs into the lungs every 6 (six) hours as needed for Wheezing.      beta-carotene,A,-vits C,E/mins (OCUVITE ORAL) Take 1 tablet by mouth once daily.      clotrimazole-betamethasone 1-0.05% (LOTRISONE) cream 2 (two) times a day. to affected area      diphenoxylate-atropine 2.5-0.025 mg (LOMOTIL) 2.5-0.025 mg per tablet       ergocalciferol, vitamin D2, (VITAMIN D ORAL) Take 25 mcg by mouth once daily.      folic acid (FOLVITE) 800 MCG Tab Take 800 mcg by mouth once daily.      furosemide (LASIX) 40 MG tablet Take 1 tablet (40 mg total) by mouth 2 (two) times a " day. 180 tablet 4    gabapentin (NEURONTIN) 100 MG capsule Take 100 mg by mouth 2 (two) times daily.      hydrochlorothiazide (HYDRODIURIL) 25 MG tablet Take 25 mg by mouth once daily.      levothyroxine (SYNTHROID) 125 MCG tablet Take 125 mcg by mouth before breakfast.      lisinopriL (PRINIVIL,ZESTRIL) 40 MG tablet Take 40 mg by mouth once daily.      metoprolol succinate (TOPROL-XL) 25 MG 24 hr tablet Take 25 mg by mouth once daily.      multivit with calcium,iron,min (WOMEN'S DAILY FORMULA ORAL) Take 1 tablet by mouth once daily.      nystatin-triamcinolone (MYCOLOG) ointment APPLY OINTMENT TOPICALLY TO AFFECTED AREA TWICE DAILY      TRELEGY ELLIPTA 200-62.5-25 mcg inhaler Inhale 1 puff into the lungs once daily.      potassium chloride (MICRO-K) 10 MEQ CpSR          Please address this information as you see fit.  Feel free to contact us if you have any questions or require assistance.    Shirley Phelps  [phone]                .

## 2023-04-06 NOTE — PLAN OF CARE
SW met with pt at bedside this AM at bedside to complete DCA. PT stated that she lives at home with her brother Leopoldo [phone] and will have hime help transport her home at time of d/c. Pt stated that she has a RW, WC, TRILLOGY, Nebulizar and BSC at home. Pt is current with [facility]. SW will request f/u appts. White board updated with CM name and contact information.  Discharge brochure provided.  Pt encouraged to call with any questions or concerns.  Cm will continue to follow pt through transitions of care and assist with any discharge needs.    Jhon Eugene, MSW  [phone]    Future Appointments   Date Time Provider Department Center   4/11/2023  9:30 AM Marcy Valdivia MD [facility]        04/06/23 1316   Post-Acute Status   Post-Acute Authorization Placement   Post-Acute Placement Status Set-up Complete/Auth obtained   Discharge Delays None known at this time   Discharge Plan   Discharge Plan A Home

## 2023-04-06 NOTE — ED NOTES
Lab called requesting to re-order Iron, Ferritin, T4, and TSH for send out to [facility]. Reported issue with first sample send to [facility] [place] and states they are sending out another sample.

## 2023-04-06 NOTE — PROGRESS NOTES
"[facility] Medicine Progress Note    Primary Team: [facility] Hospitalist Team A  Attending Physician: Caryn Loaiza MD  Resident: [place]  Intern: [facility] Day: 1     Chief Complaint:  MINDI  Subjective:      Patient seen and examined by me this morning. States she is feeling better this AM. Breathing improved.    Review of Systems   Constitutional:  Negative for chills and fever.   HENT:  Negative for congestion and sore throat.    Eyes:  Negative for pain and redness.   Respiratory:  Positive for cough. Negative for sputum production.    Cardiovascular:  Negative for chest pain, palpitations and orthopnea.   Gastrointestinal:  Negative for abdominal pain, nausea and vomiting.   Genitourinary:  Negative for dysuria and frequency.   Musculoskeletal:  Negative for back pain and myalgias.   Skin:  Negative for itching and rash.   Neurological:  Negative for dizziness and headaches.   Psychiatric/Behavioral:  Negative for hallucinations. The patient is not nervous/anxious.    All other systems reviewed and are negative.   Past Medical History:   Diagnosis Date    CKD (chronic kidney disease) stage 4, GFR 15-29 ml/min     COPD (chronic obstructive pulmonary disease)     DVT (deep venous thrombosis) left leg post cholecystectomy    Hypercalcemia     Hypercalcemia     Hypertension     Hypokalemia     Hypothyroidism     Kidney stone     Thyroid disease     Urinary tract infection     Vaginal infection     Vitamin D deficiency               Objective:   Last 24 Hour Vital Signs:  BP  Min: 98/53  Max: 140/67  Temp  Av.4 °F (36.3 °C)  Min: 96.1 °F (35.6 °C)  Max: 98 °F (36.7 °C)  Pulse  Av.1  Min: 66  Max: 95  Resp  Av  Min: 18  Max: 20  SpO2  Av.7 %  Min: 90 %  Max: 98 %  Height  Av' 1" (154.9 cm)  Min: 5' 1" (154.9 cm)  Max: 5' 1" (154.9 cm)  Weight  Av.8 kg (186 lb 13.8 oz)  Min: 84.7 kg (186 lb 11.7 oz)  Max: 84.8 kg (187 lb)    Intake/Output Summary (Last 24 hours) at 2023 0944  Last " data filed at 4/6/2023 0417  Gross per 24 hour   Intake 300 ml   Output 1725 ml   Net -1425 ml      Physical Examination:  Physical Exam   Constitutional: She is oriented to person, place, and time. She appears obese.   HENT:   Head: Normocephalic.   Right Ear: External ear normal.   Left Ear: External ear normal.   Nose: Rhinorrhea present.   Mouth/Throat: Mucous membranes are moist. Oropharynx is clear.   Eyes: Pupils are equal, round, and reactive to light.   Cardiovascular: Normal rate, regular rhythm, normal heart sounds and normal pulses. Exam reveals no gallop.   No murmur heard.Pulmonary:      Effort: Pulmonary effort is normal.      Breath sounds: Wheezing present. No rales.      Comments: Bilateral wheezes present with expiration in bilateral lung fields that is improved from yesterday     Abdominal: Soft. Bowel sounds are normal. She exhibits no distension. There is no abdominal tenderness.   Musculoskeletal:         General: Swelling present.      Comments: Chronic venous stasis appearance bilaterally with leg swelling, redness, skin thickening; compression stockings in place   Neurological: She is alert and oriented to person, place, and time. No cranial nerve deficit or sensory deficit.   Skin: Capillary refill takes less than 2 seconds. No rash noted. No erythema.   Psychiatric: Her behavior is normal. Mood normal.      Laboratory:  Recent Labs   Lab 04/05/23  1059 04/05/23  2204 04/06/23  0406   WBC 9.16  --  6.22   HGB 12.2  --  10.0*   HCT 37.3  --  30.9*     --  217   MCV 93  --  94   RDW 13.0  --  13.2    141 141   K 5.9* 5.6* 5.5*    108 108   CO2 19* 18* 22*   * 108* 107*   CREATININE 4.8* 3.7* 3.1*   GLU 98 90 83   PROT 7.8  --  6.1   ALBUMIN 3.2*  --  2.5*   BILITOT 0.4  --  0.6   AST 22  --  20   ALKPHOS 118  --  91   ALT 19  --  13       Urinalysis  Recent Labs   Lab 04/05/23  1229   COLORU Colorless*   SPECGRAV 1.010   PHUR 5.0   PROTEINUA Negative   BACTERIA Few*    NITRITE Negative   LEUKOCYTESUR 1+*   UROBILINOGEN Negative   HYALINECASTS 8*       Microbiology Results (last 7 days)       Procedure Component Value Units Date/Time    Blood culture [[id number]] Collected: 04/05/23 1508    Order Status: Completed Specimen: Blood from Peripheral, Forearm, Right Updated: 04/06/23 0345     Blood Culture, Routine No Growth to date    Blood culture [[id number]] Collected: 04/05/23 1508    Order Status: Completed Specimen: Blood from Peripheral, Forearm, Right Updated: 04/06/23 0345     Blood Culture, Routine No Growth to date    Urine culture [[id number]] Collected: 04/05/23 1229    Order Status: No result Specimen: Urine Updated: 04/05/23 1320             I have personally reviewed the above laboratory studies.     Imaging:  EKG (my interpretation):  no new    X-Ray Chest AP Portable   Final Result      No acute cardiopulmonary abnormality or significant detrimental change from prior.         Electronically signed by: Nakul Montague   Date:    04/05/2023   Time:    19:21      CT Chest Without Contrast    (Results Pending)       Current Medications:     Scheduled:   albuterol sulfate  2.5 mg Nebulization Q6H    ipratropium  0.5 mg Nebulization Q4H WAKE    levothyroxine  125 mcg Oral Before breakfast    oxybutynin  10 mg Oral Daily    sodium zirconium cyclosilicate  10 g Oral TID         Infusions:   lactated ringers 125 mL/hr at 04/06/23 0850        PRN:  acetaminophen  Antibiotics and Day Number of Therapy:  Antibiotics (From admission, onward)      None               Assessment:     Socorro Lucas is a 72 y.o. female with an extensive PMHX presenting with increasing fatigue and generalized weakness for ~2weeks in the setting of COVID-19 infection diagnosed on 03/22/23. She was also found to elevated Cr on admit labs c/w MINDI on CKD-IV. IVF resuscitation initiated and urine studies ordered which resulted as intrinsic in etiology. CXR with some hyperexpansion but otherwise without  consolidations. Blood cultures with no growth. Etiology of weakness likely 2/2 deconditioning 2/2 COVID-19 and decreased mobility 2/2 lower extremity venous stasis. MINDI likely 2/2 decreased PO intake. MINDI improving and patient subjectively feels better. Wheezing improving.        Plan:     Obstructive Uropathy  Acute Kidney Injury on CKD IV  #hyperkalemia  - Patient with Cr 4.8 /, K 5.9 on admit labs (baseline Cr ~1.0-1.3) in the setting of CKD-IV   - Reports decreased PO intake for the past ~2weeks in the setting of COVID-19  - BUN/Cr of 26:1---> likely pre-renal etiology from dehydration  - K shifted in ED  - Urine studies with FeUrea 41.7% indicative of intrinsic renal  Plan:  - Replete lytes as needed  - IVF resuscitation, encourage PO intake  - Avoid nephrotoxic agents  - Strict I/O  - Daily CMP        COVID-19  #deconditioning #generalizedweakness #cough  - Patient with positive COVID-19 diagnosis on 3/22/23 with symptoms of cough, generalized weakness/fatigue, decreased PO intake--->treated outpatient with cough suppressant, corticosteroid injection, azithromycin  - Patient reports that cough is mildly improved (has cough at baseline from COPD) but that her fatigue,generalized weakness, decreased PO intake have not improved since her initial diagnosis   - Admit vitals with spO2 94-98% on 2L NC  - On physical exam, bilateral diffuse wheezes are present  - Repeat rapid COVID swab negative  - CXR without focal consilidation  - Bcx with no growth  - Lactate WNL, CK WNL, procalc wnl  Plan:  - Duonebs q4  - PT/OT consult     COPD  - Patient with hx of COPD  - Home medications include: albuterol, trellegy   - No home O2 use  - Last pulmonology evaluation unclear, no PFTs found  - On 2L NC in ED, sating @ 94-98%  Plan:  - Duonebs as ordered above  - Holding home trellegy, not on formulary  - Follow up outpatient with pulm      Anemia of Chronic Disease  - Patient with admit H/H 12.2/37.3 in the setting of  "documented hx of anemia of chronic disease likely 2/2 CKD  - Baseline Hgb appears to be ~12-13  - Iron panel with iron sat 16%, TIBC 209, Transferrin 141, Iron 34  - B12, Folate WNL  - TSH approp low in setting of synthroid use  Plan:  - No acute intervention, continue to monitor        Urinary Tract Infection  Hx of Recurrent UTI  #chronicnephrolithiasis #uretalreflux   - Patient with hx of chronic UTI, kidney stones, uretal reflux that required placement of chronic in-dwelling catheter  - Follows with Dr. Riley (Urology) with regular catheter changes and evaluation  - No acute issues/concerns at this time  - UA microscopic with 20 WBC, Few bacteria  - Urinalysis LE 1+  Plan:  - Urine culture and sensitivities pending  - Follow up outpatient with Urology as scheduled      Hx of DVT  - Patient reports hx of blood clot in left lower extremity "many years" ago  - Not on home anticoagulation  - Residual tenderness to L calf on exam that patient states has been present since her initial diagnosis   Plan:  - VTE prophylaxis while inpatient with heparin     Chronic Venous Insufficiency/Stasis of Lower Extremities  - Patient with hx of venous stasis in lower extremities with significant bilateral edema  - Previously worked up outpatient with Cardiology ([facility] 08/22)---> compression stockings unsuccessful, started on lasix 40mg BID, Ultrasound of LE ordered but never completed   - Physical exam on admit with lower extremity swelling, thickening, redness, water blebs, compression stockings in place  - BNP 68 on admit, supports venous insufficiency as opposed to cardiac origin   Plan:  - Holding lasix in setting of MINDI  - Follow up outpatient for  LE     Hypertension  - Patient with hx of longstanding HTN  - On home  HCTZ 25 daily, metoprolol succinate 25mg daily, lisinopril 40mg daily  - SBP subjectively in 80s morning of admission-->/53-74 in ED  Plan:  - Holding BP medications in setting of soft pressures   " "  CAD w/ insertion of stent  - Patient with subjective hx of CAD with stenting (unknown time, states was many years ago)  - Prior XR films with noted atherosclerosis of aorta   - No active chest pain   - Admit EKG with sinus elizabeth, low voltage  - Patient follows with Cardiology , [facility] 08/22 (see above under venous stasis)  - TTE on 07/2021 with LVEF 60%, normal LV size, no valvular disease  Plan:  - Lipid panel  - Repeat TTE ordered     Hypothyroidism  - Patient with hx of hypothyroidism for "many years"  - On home synthroid 125mcg daily  - No symptoms at this time  Plan:  - Continue synthroid   No problem-specific Assessment & Plan notes found for this encounter.       Diet: Diet renal   DVT Prophylaxis: SCD  Code: Full Code     Disposition: pending resolution/improvement of MINDI       Rome Weaver M.D.  [facility] PGY-1    [facility] Medicine Hospitalist Pager numbers:   [facility] Hospitalist Medicine Team A (Fadia/Christian): [phone]  [facility] Hospitalist Medicine Team B (Sara/Peter):  [phone]    "

## 2023-04-07 VITALS
HEART RATE: 85 BPM | SYSTOLIC BLOOD PRESSURE: 107 MMHG | BODY MASS INDEX: 36.87 KG/M2 | OXYGEN SATURATION: 92 % | DIASTOLIC BLOOD PRESSURE: 54 MMHG | TEMPERATURE: 98 F | WEIGHT: 195.31 LBS | RESPIRATION RATE: 20 BRPM | HEIGHT: 61 IN

## 2023-04-07 PROBLEM — E87.5 HYPERKALEMIA: Status: RESOLVED | Noted: 2023-04-05 | Resolved: 2023-04-07

## 2023-04-07 PROBLEM — N39.0 RECURRENT UTI: Status: RESOLVED | Noted: 2022-02-14 | Resolved: 2023-04-07

## 2023-04-07 LAB
ALBUMIN SERPL BCP-MCNC: 2.4 G/DL (ref 3.5–5.2)
ALP SERPL-CCNC: 86 U/L (ref 55–135)
ALT SERPL W/O P-5'-P-CCNC: 12 U/L (ref 10–44)
ANION GAP SERPL CALC-SCNC: 8 MMOL/L (ref 8–16)
AST SERPL-CCNC: 20 U/L (ref 10–40)
BACTERIA UR CULT: ABNORMAL
BACTERIA UR CULT: ABNORMAL
BASOPHILS # BLD AUTO: 0.02 K/UL (ref 0–0.2)
BASOPHILS NFR BLD: 0.3 % (ref 0–1.9)
BILIRUB SERPL-MCNC: 0.8 MG/DL (ref 0.1–1)
BUN SERPL-MCNC: 58 MG/DL (ref 8–23)
CALCIUM SERPL-MCNC: 8.6 MG/DL (ref 8.7–10.5)
CHLORIDE SERPL-SCNC: 111 MMOL/L (ref 95–110)
CO2 SERPL-SCNC: 25 MMOL/L (ref 23–29)
CREAT SERPL-MCNC: 1.8 MG/DL (ref 0.5–1.4)
DIFFERENTIAL METHOD: ABNORMAL
EOSINOPHIL # BLD AUTO: 0.2 K/UL (ref 0–0.5)
EOSINOPHIL NFR BLD: 2.7 % (ref 0–8)
ERYTHROCYTE [DISTWIDTH] IN BLOOD BY AUTOMATED COUNT: 13.2 % (ref 11.5–14.5)
EST. GFR  (NO RACE VARIABLE): 30 ML/MIN/1.73 M^2
GLUCOSE SERPL-MCNC: 92 MG/DL (ref 70–110)
HCT VFR BLD AUTO: 30.3 % (ref 37–48.5)
HGB BLD-MCNC: 9.7 G/DL (ref 12–16)
IMM GRANULOCYTES # BLD AUTO: 0.1 K/UL (ref 0–0.04)
IMM GRANULOCYTES NFR BLD AUTO: 1.6 % (ref 0–0.5)
LYMPHOCYTES # BLD AUTO: 1.3 K/UL (ref 1–4.8)
LYMPHOCYTES NFR BLD: 20.5 % (ref 18–48)
MAGNESIUM SERPL-MCNC: 1.6 MG/DL (ref 1.6–2.6)
MCH RBC QN AUTO: 30.6 PG (ref 27–31)
MCHC RBC AUTO-ENTMCNC: 32 G/DL (ref 32–36)
MCV RBC AUTO: 96 FL (ref 82–98)
MONOCYTES # BLD AUTO: 0.6 K/UL (ref 0.3–1)
MONOCYTES NFR BLD: 9.3 % (ref 4–15)
NEUTROPHILS # BLD AUTO: 4.1 K/UL (ref 1.8–7.7)
NEUTROPHILS NFR BLD: 65.6 % (ref 38–73)
NRBC BLD-RTO: 0 /100 WBC
PHOSPHATE SERPL-MCNC: 3.8 MG/DL (ref 2.7–4.5)
PLATELET # BLD AUTO: 198 K/UL (ref 150–450)
PMV BLD AUTO: 10.1 FL (ref 9.2–12.9)
POTASSIUM SERPL-SCNC: 4.9 MMOL/L (ref 3.5–5.1)
PROT SERPL-MCNC: 6 G/DL (ref 6–8.4)
RBC # BLD AUTO: 3.17 M/UL (ref 4–5.4)
SODIUM SERPL-SCNC: 144 MMOL/L (ref 136–145)
WBC # BLD AUTO: 6.25 K/UL (ref 3.9–12.7)

## 2023-04-07 PROCEDURE — 94640 AIRWAY INHALATION TREATMENT: CPT

## 2023-04-07 PROCEDURE — 80053 COMPREHEN METABOLIC PANEL: CPT | Performed by: STUDENT IN AN ORGANIZED HEALTH CARE EDUCATION/TRAINING PROGRAM

## 2023-04-07 PROCEDURE — 83735 ASSAY OF MAGNESIUM: CPT | Performed by: STUDENT IN AN ORGANIZED HEALTH CARE EDUCATION/TRAINING PROGRAM

## 2023-04-07 PROCEDURE — 27000221 HC OXYGEN, UP TO 24 HOURS

## 2023-04-07 PROCEDURE — 25000242 PHARM REV CODE 250 ALT 637 W/ HCPCS: Performed by: STUDENT IN AN ORGANIZED HEALTH CARE EDUCATION/TRAINING PROGRAM

## 2023-04-07 PROCEDURE — 94761 N-INVAS EAR/PLS OXIMETRY MLT: CPT

## 2023-04-07 PROCEDURE — 36415 COLL VENOUS BLD VENIPUNCTURE: CPT | Performed by: STUDENT IN AN ORGANIZED HEALTH CARE EDUCATION/TRAINING PROGRAM

## 2023-04-07 PROCEDURE — 85025 COMPLETE CBC W/AUTO DIFF WBC: CPT | Performed by: STUDENT IN AN ORGANIZED HEALTH CARE EDUCATION/TRAINING PROGRAM

## 2023-04-07 PROCEDURE — 99900035 HC TECH TIME PER 15 MIN (STAT)

## 2023-04-07 PROCEDURE — 84100 ASSAY OF PHOSPHORUS: CPT | Performed by: STUDENT IN AN ORGANIZED HEALTH CARE EDUCATION/TRAINING PROGRAM

## 2023-04-07 PROCEDURE — 25000003 PHARM REV CODE 250: Performed by: STUDENT IN AN ORGANIZED HEALTH CARE EDUCATION/TRAINING PROGRAM

## 2023-04-07 RX ORDER — GABAPENTIN 100 MG/1
200 CAPSULE ORAL NIGHTLY PRN
Qty: 180 CAPSULE | Refills: 0 | Status: SHIPPED | OUTPATIENT
Start: 2023-04-07 | End: 2024-03-12

## 2023-04-07 RX ADMIN — IPRATROPIUM BROMIDE 0.5 MG: 0.5 SOLUTION RESPIRATORY (INHALATION) at 07:04

## 2023-04-07 RX ADMIN — ALBUTEROL SULFATE 2.5 MG: 2.5 SOLUTION RESPIRATORY (INHALATION) at 07:04

## 2023-04-07 RX ADMIN — LEVOTHYROXINE SODIUM 125 MCG: 25 TABLET ORAL at 05:04

## 2023-04-07 RX ADMIN — ALBUTEROL SULFATE 2.5 MG: 2.5 SOLUTION RESPIRATORY (INHALATION) at 12:04

## 2023-04-07 RX ADMIN — IPRATROPIUM BROMIDE 0.5 MG: 0.5 SOLUTION RESPIRATORY (INHALATION) at 12:04

## 2023-04-07 NOTE — PROGRESS NOTES
[facility]                    Pharmacy       Discharge Medication Education    Patient ACCEPTED medication education. Pharmacy has provided education on the name, indication, and possible side effects of the medication(s) prescribed, using teach-back method.     The following medications have also been discussed, during this admission.        Medication List        CHANGE how you take these medications      gabapentin 100 MG capsule  Commonly known as: NEURONTIN  Take 2 capsules (200 mg total) by mouth nightly as needed (pain).  What changed:   how much to take  when to take this  reasons to take this            CONTINUE taking these medications      clotrimazole-betamethasone 1-0.05% cream  Commonly known as: LOTRISONE     diphenoxylate-atropine 2.5-0.025 mg 2.5-0.025 mg per tablet  Commonly known as: LOMOTIL     folic acid 800 MCG Tab  Commonly known as: FOLVITE     levothyroxine 125 MCG tablet  Commonly known as: SYNTHROID     metoprolol succinate 25 MG 24 hr tablet  Commonly known as: TOPROL-XL     nystatin-triamcinolone ointment  Commonly known as: MYCOLOG     OCUVITE ORAL     TRELEGY ELLIPTA 200-62.5-25 mcg inhaler  Generic drug: fluticasone-umeclidin-vilanter     VITAMIN D ORAL     WOMEN'S DAILY FORMULA ORAL            STOP taking these medications      acetaminophen 500 MG tablet  Commonly known as: TYLENOL     albuterol 90 mcg/actuation inhaler  Commonly known as: PROVENTIL/VENTOLIN HFA     furosemide 40 MG tablet  Commonly known as: LASIX     hydroCHLOROthiazide 25 MG tablet  Commonly known as: HYDRODIURIL     lisinopriL 40 MG tablet  Commonly known as: PRINIVIL,ZESTRIL     potassium chloride 10 MEQ Cpsr  Commonly known as: MICRO-K               Where to Get Your Medications        These medications were sent to [facility] [id number] [address] - [address]  [address] [place] [address]      Phone: [phone]   gabapentin 100 MG capsule          Thank  you  Tamara Jones, PharmD  [phone]

## 2023-04-07 NOTE — PROGRESS NOTES
"[facility] Medicine Progress Note    Primary Team: [facility] Hospitalist Team A  Attending Physician: Caryn Loaiza MD  Resident: Avila  Intern: [facility] Day: 2     Chief Complaint:  MINDI  Subjective:      Patient seen and examined by me this morning. She states she is feeling great today and that she wants to go home. No other complaints at this time.    Review of Systems   Constitutional:  Negative for chills and fever.   HENT:  Negative for congestion and sore throat.    Eyes:  Negative for pain and redness.   Respiratory:  Positive for cough. Negative for sputum production.    Cardiovascular:  Negative for chest pain, palpitations and orthopnea.   Gastrointestinal:  Negative for abdominal pain, nausea and vomiting.   Genitourinary:  Negative for dysuria and frequency.   Musculoskeletal:  Negative for back pain and myalgias.   Skin:  Negative for itching and rash.   Neurological:  Negative for dizziness and headaches.   Psychiatric/Behavioral:  Negative for hallucinations. The patient is not nervous/anxious.    All other systems reviewed and are negative.   Past Medical History:   Diagnosis Date    CKD (chronic kidney disease) stage 4, GFR 15-29 ml/min     COPD (chronic obstructive pulmonary disease)     DVT (deep venous thrombosis) left leg post cholecystectomy    Hypercalcemia     Hypercalcemia     Hypertension     Hypokalemia     Hypothyroidism     Kidney stone     Thyroid disease     Urinary tract infection     Vaginal infection     Vitamin D deficiency               Objective:   Last 24 Hour Vital Signs:  BP  Min: 110/53  Max: 119/57  Temp  Av.4 °F (36.3 °C)  Min: 97.2 °F (36.2 °C)  Max: 97.6 °F (36.4 °C)  Pulse  Av.4  Min: 72  Max: 93  Resp  Av.9  Min: 18  Max: 20  SpO2  Av.8 %  Min: 91 %  Max: 96 %  Height  Av' 1" (154.9 cm)  Min: 5' 1" (154.9 cm)  Max: 5' 1" (154.9 cm)  Weight  Av.5 kg (190 lb 10.6 oz)  Min: 84.4 kg (186 lb)  Max: 88.6 kg (195 lb 5.2 " oz)    Intake/Output Summary (Last 24 hours) at 4/7/2023 0816  Last data filed at 4/7/2023 0405  Gross per 24 hour   Intake 800 ml   Output 3975 ml   Net -3175 ml        Physical Examination:  Physical Exam   Constitutional: She is oriented to person, place, and time. She appears obese.   HENT:   Head: Normocephalic.   Right Ear: External ear normal.   Left Ear: External ear normal.   Nose: Rhinorrhea present.   Mouth/Throat: Mucous membranes are moist. Oropharynx is clear.   Eyes: Pupils are equal, round, and reactive to light.   Cardiovascular: Normal rate, regular rhythm, normal heart sounds and normal pulses. Exam reveals no gallop.   No murmur heard.Pulmonary:      Effort: Pulmonary effort is normal.      Breath sounds: Wheezing present. No rales.      Comments: Bilateral wheezes present with expiration in bilateral lung fields that is improving     Abdominal: Soft. Bowel sounds are normal. She exhibits no distension. There is no abdominal tenderness.   Musculoskeletal:         General: Swelling present.      Comments: Chronic venous stasis appearance bilaterally with leg swelling, redness, skin thickening; compression stockings in place   Neurological: She is alert and oriented to person, place, and time. No cranial nerve deficit or sensory deficit.   Skin: Capillary refill takes less than 2 seconds. No rash noted. No erythema.   Psychiatric: Her behavior is normal. Mood normal.      Laboratory:  Recent Labs   Lab 04/05/23  1059 04/05/23  2204 04/06/23  0406 04/06/23  1834 04/07/23  0615 04/07/23  0616   WBC 9.16  --  6.22  --   --  6.25   HGB 12.2  --  10.0*  --   --  9.7*   HCT 37.3  --  30.9*  --   --  30.3*     --  217  --   --  198   MCV 93  --  94  --   --  96   RDW 13.0  --  13.2  --   --  13.2    141 141 143 144  --    K 5.9* 5.6* 5.5* 4.9 4.9  --     108 108 111* 111*  --    CO2 19* 18* 22* 21* 25  --    * 108* 107* 77* 58*  --    CREATININE 4.8* 3.7* 3.1* 2.1* 1.8*  --     GLU 98 90 83 86 92  --    PROT 7.8  --  6.1  --  6.0  --    ALBUMIN 3.2*  --  2.5*  --  2.4*  --    BILITOT 0.4  --  0.6  --  0.8  --    AST 22  --  20  --  20  --    ALKPHOS 118  --  91  --  86  --    ALT 19  --  13  --  12  --          Urinalysis  No results for input(s): COLORU, CLARITYU, SPECGRAV, PHUR, PROTEINUA, GLUCOSEU, BILIRUBINCON, BLOODU, WBCU, RBCU, BACTERIA, MUCUS, NITRITE, LEUKOCYTESUR, UROBILINOGEN, HYALINECASTS in the last 24 hours.      Microbiology Results (last 7 days)       Procedure Component Value Units Date/Time    Blood culture [[id number]] Collected: 04/05/23 1508    Order Status: Completed Specimen: Blood from Peripheral, Forearm, Right Updated: 04/06/23 2213     Blood Culture, Routine No Growth to date      No Growth to date    Blood culture [[id number]] Collected: 04/05/23 1508    Order Status: Completed Specimen: Blood from Peripheral, Forearm, Right Updated: 04/06/23 2213     Blood Culture, Routine No Growth to date      No Growth to date    Urine culture [[id number]]  (Abnormal) Collected: 04/05/23 1229    Order Status: Completed Specimen: Urine Updated: 04/06/23 1721     Urine Culture, Routine GRAM NEGATIVE SHAYLA  50,000 - 99,999 cfu/ml  Identification and susceptibility pending  No other significant isolate      Narrative:      Specimen Source->Urine             I have personally reviewed the above laboratory studies.     Imaging:  EKG (my interpretation):  no new    X-Ray Chest AP Portable   Final Result      No acute cardiopulmonary abnormality or significant detrimental change from prior.         Electronically signed by: Nakul Montague   Date:    04/05/2023   Time:    19:21          Current Medications:     Scheduled:   albuterol sulfate  2.5 mg Nebulization Q6H    ipratropium  0.5 mg Nebulization Q6H    levothyroxine  125 mcg Oral Before breakfast    mupirocin   Nasal BID    oxybutynin  10 mg Oral Daily         Infusions:   lactated ringers 125 mL/hr at 04/06/23 0850         PRN:  acetaminophen, gabapentin  Antibiotics and Day Number of Therapy:  Antibiotics (From admission, onward)      Start     Stop Route Frequency Ordered    04/07/23 0900  mupirocin 2 % ointment         04/12 0859 Nasl 2 times daily 04/06/23 0741               Assessment:     Socorro Lucas is a 72 y.o. female with an extensive PMHX presenting with increasing fatigue and generalized weakness for ~2weeks in the setting of COVID-19 infection diagnosed on 03/22/23. She was also found to elevated Cr on admit labs c/w MINDI on CKD-IV. IVF resuscitation initiated and urine studies ordered which resulted as intrinsic in etiology. CXR with some hyperexpansion but otherwise without consolidations. Blood cultures with no growth. Etiology of weakness likely 2/2 deconditioning 2/2 COVID-19 and decreased mobility 2/2 lower extremity venous stasis. MINDI likely 2/2 decreased PO intake. MINDI improving and patient subjectively feels better. Wheezing improving.         Plan:     Obstructive Uropathy  Acute Kidney Injury on CKD IV (improving)  #hyperkalemia(resolved)  - Patient with Cr 4.8 /, K 5.9 on admit labs (baseline Cr ~1.0-1.3) in the setting of CKD-IV   - Reports decreased PO intake for the past ~2weeks in the setting of COVID-19  - BUN/Cr of 26:1---> likely pre-renal etiology from dehydration  - K shifted in ED  - Urine studies with FeUrea 41.7% indicative of intrinsic renal  - Urine eosinophil stain negative  Plan:  - Replete lytes as needed  - IVF resuscitation, encourage PO intake  - Avoid nephrotoxic agents  - Strict I/O  - Daily CMP        COVID-19  #deconditioning #generalizedweakness #cough  - Patient with positive COVID-19 diagnosis on 3/22/23 with symptoms of cough, generalized weakness/fatigue, decreased PO intake--->treated outpatient with cough suppressant, corticosteroid injection, azithromycin  - Patient reports that cough is mildly improved (has cough at baseline from COPD) but that her  "fatigue,generalized weakness, decreased PO intake have not improved since her initial diagnosis   - Admit vitals with spO2 94-98% on 2L NC  - On physical exam, bilateral diffuse wheezes are present  - Repeat rapid COVID swab negative  - CXR without focal consilidation  - Bcx with no growth  - Lactate WNL, CK WNL, procalc wnl  Plan:  - Duonebs q4  - PT/OT consult     COPD  - Patient with hx of COPD  - Home medications include: albuterol, trellegy   - No home O2 use  - Last pulmonology evaluation unclear, no PFTs found  - On 2L NC in ED, sating @ 94-98%  Plan:  - Duonebs as ordered above  - Holding home trellegy, not on formulary  - Follow up outpatient with pulm      Anemia of Chronic Disease  - Patient with admit H/H 12.2/37.3 in the setting of documented hx of anemia of chronic disease likely 2/2 CKD  - Baseline Hgb appears to be ~12-13  - Iron panel with iron sat 16%, TIBC 209, Transferrin 141, Iron 34  - B12, Folate WNL  - TSH approp low in setting of synthroid use  Plan:  - No acute intervention, continue to monitor        Urinary Tract Infection  Hx of Recurrent UTI  #chronicnephrolithiasis #uretalreflux   - Patient with hx of chronic UTI, kidney stones, uretal reflux that required placement of chronic in-dwelling catheter  - Follows with Dr. Riley (Urology) with regular catheter changes and evaluation  - No acute issues/concerns at this time  - UA microscopic with 20 WBC, Few bacteria  - Urinalysis LE 1+  - Urine Cx with >100,000CFU gram negative rods, susceptibility pending  Plan:  - Urine culture sensitivities pending  - Follow up outpatient with Urology as scheduled      Hx of DVT  - Patient reports hx of blood clot in left lower extremity "many years" ago  - Not on home anticoagulation  - Residual tenderness to L calf on exam that patient states has been present since her initial diagnosis   Plan:  - VTE prophylaxis while inpatient with heparin     Chronic Venous Insufficiency/Stasis of Lower " "Extremities  - Patient with hx of venous stasis in lower extremities with significant bilateral edema  - Previously worked up outpatient with Cardiology ([facility] 08/22)---> compression stockings unsuccessful, started on lasix 40mg BID, Ultrasound of LE ordered but never completed   - Physical exam on admit with lower extremity swelling, thickening, redness, water blebs, compression stockings in place  - BNP 68 on admit, supports venous insufficiency as opposed to cardiac origin   Plan:  - Holding lasix in setting of MINDI  - Follow up outpatient for  JESI     Hypertension  - Patient with hx of longstanding HTN  - On home  HCTZ 25 daily, metoprolol succinate 25mg daily, lisinopril 40mg daily  - SBP subjectively in 80s morning of admission-->/53-74 in ED  Plan:  - Holding BP medications in setting of soft pressures     CAD w/ insertion of stent  - Patient with subjective hx of CAD with stenting (unknown time, states was many years ago)  - Prior XR films with noted atherosclerosis of aorta   - No active chest pain   - Admit EKG with sinus elizabeth, low voltage  - Patient follows with Cardiology (Miguel Angel, [facility] (see above under venous stasis)  - TTE on 07/2021 with LVEF 60%, normal LV size, no valvular disease  Plan:  - Lipid panel outpatient  - Repeat TTE ordered     Hypothyroidism  - Patient with hx of hypothyroidism for "many years"  - On home synthroid 125mcg daily  - No symptoms at this time  Plan:  - Continue synthroid        Diet: Diet renal   DVT Prophylaxis: SCD  Code: Full Code     Disposition: pending resolution/improvement of MINDI       Rome Weaver M.D.  [facility] Neurology PGY-1    [facility] Medicine Hospitalist Pager numbers:   [facility] Hospitalist Medicine Team A (Fadia/Christian): [phone]  [facility] Hospitalist Medicine Team B (Sara/Peter):  [phone]    "

## 2023-04-07 NOTE — DISCHARGE SUMMARY
[facility] Hospital Medicine Discharge Summary    Primary Team: [facility] Hospitalist Team A  Attending Physician: Caryn Loaiza MD  Resident: Avila  Intern: Owen    Date of Admit: 4/5/2023  Date of Discharge: 4/7/2023    Discharge to: Home  Condition: Good    Discharge Diagnoses     Present on Admission:   Acute renal failure   (Resolved) Hyperkalemia   (Resolved) Recurrent UTI   Essential hypertension   Class 2 obesity due to excess calories without serious comorbidity in adult   Urinary retention      Consultants and Procedures     Consultants:       Procedures:       Brief History of Present Illness & Summary of Hospital Course      Socorro Lucas is a 72 y.o. female with an extensive PMHX presenting with increasing fatigue and generalized weakness for ~2weeks in the setting of COVID-19 infection diagnosed on 03/22/23. She was also found to elevated Cr on admit labs c/w MINDI on CKD-IV. IVF resuscitation initiated and urine studies ordered which resulted as intrinsic in etiology. CXR with some hyperexpansion but otherwise without consolidations. Blood cultures with no growth. Etiology of weakness likely 2/2 deconditioning 2/2 COVID-19 and decreased mobility 2/2 lower extremity venous stasis. MINDI likely 2/2 decreased PO intake. MINDI resolving and patient subjectively feels better. Wheezing improving.   On the day of discharge, patient was afebrile with stable vital signs and will be discharged in stable condition with close follow up.     For the full HPI please refer to the History & Physical from this admission.    Hospital Course By Problem with Pertinent Findings     Acute Kidney Injury on CKD IV (improving)  #hyperkalemia(resolved)  - Patient with Cr 4.8 /, K 5.9 on admit labs (baseline Cr ~1.0-1.3) in the setting of CKD-IV   - Reports decreased PO intake for the past ~2weeks in the setting of COVID-19  - BUN/Cr of 26:1---> likely pre-renal etiology from dehydration  - K shifted in ED  - Urine studies with  FeUrea 41.7% indicative of intrinsic renal  - Urine eosinophil stain negative  - IVF resuscitated, encouraged PO intake  - Follow up outpatient for BMP in one week        COVID-19  #deconditioning #generalizedweakness #cough  - Patient with positive COVID-19 diagnosis on 3/22/23 with symptoms of cough, generalized weakness/fatigue, decreased PO intake--->treated outpatient with cough suppressant, corticosteroid injection, azithromycin  - Patient reports that cough is mildly improved (has cough at baseline from COPD) but that her fatigue,generalized weakness, decreased PO intake have not improved since her initial diagnosis   - Admit vitals with spO2 94-98% on 2L NC  - On physical exam, bilateral diffuse wheezes are present  - Repeat rapid COVID swab negative  - CXR without focal consilidation  - Bcx with no growth  - Lactate WNL, CK WNL, procalc wnl         COPD  - Patient with hx of COPD  - Home medications include: albuterol, trellegy   - No home O2 use  - Last pulmonology evaluation unclear, no PFTs found  - On 2L NC in ED, sating @ 94-98%  - Follow up outpatient with pulm    - Continue home trellegy    Anemia of Chronic Disease  - Patient with admit H/H 12.2/37.3 in the setting of documented hx of anemia of chronic disease likely 2/2 CKD  - Baseline Hgb appears to be ~12-13  - Iron panel with iron sat 16%, TIBC 209, Transferrin 141, Iron 34  - B12, Folate WNL  - TSH approp low in setting of synthroid use        Urinary Tract Infection  Hx of Recurrent UTI  #chronicnephrolithiasis #uretalreflux   - Patient with hx of chronic UTI, kidney stones, uretal reflux that required placement of chronic in-dwelling catheter  - Follows with Dr. Riley (Urology) with regular catheter changes and evaluation  - No acute issues/concerns at this time  - UA microscopic with 20 WBC, Few bacteria  - Urinalysis LE 1+  - Urine Cx with >100,000CFU gram negative rods, susceptibility pending  - Patient asymptomatic, no need to treat at  "this time  - Follow up outpatient with Urology as scheduled      Hx of DVT  - Patient reports hx of blood clot in left lower extremity "many years" ago  - Not on home anticoagulation  - Residual tenderness to L calf on exam that patient states has been present since her initial diagnosis        Chronic Venous Insufficiency/Stasis of Lower Extremities  - Patient with hx of venous stasis in lower extremities with significant bilateral edema  - Previously worked up outpatient with Cardiology ([facility] 08/22)---> compression stockings unsuccessful, started on lasix 40mg BID, Ultrasound of LE ordered but never completed   - Physical exam on admit with lower extremity swelling, thickening, redness, water blebs, compression stockings in place  - BNP 68 on admit, supports venous insufficiency as opposed to cardiac origin   - Follow up outpatient for  LE  - Discontinue lasix on discharge     Hypertension  - Patient with hx of longstanding HTN  - On home  HCTZ 25 daily, metoprolol succinate 25mg daily, lisinopril 40mg daily  - SBP subjectively in 80s morning of admission-->/53-74 in ED  - Discharging with adjustment to home meds as follows: continue metoprolol succinate 25, discontinue hctz, discontinue lisinopril  - Follow up with PCP for medication optimization      CAD w/ insertion of stent  - Patient with subjective hx of CAD with stenting (unknown time, states was many years ago)  - Prior XR films with noted atherosclerosis of aorta   - No active chest pain   - Admit EKG with sinus elizabeth, low voltage  - Patient follows with Cardiology (BRIANNE [facility] 08/22 (see above under venous stasis)  - TTE on 07/2021 with LVEF 60%, normal LV size, no valvular disease  - Repeat TTE with LVEF 60%  - Lipid panel outpatient     Hypothyroidism  - Patient with hx of hypothyroidism for "many years"  - On home synthroid 125mcg daily  - No symptoms at this time  - Continue synthroid                  Discharge Medications      "   Medication List        CHANGE how you take these medications      gabapentin 100 MG capsule  Commonly known as: NEURONTIN  Take 2 capsules (200 mg total) by mouth nightly as needed (pain).  What changed:   how much to take  when to take this  reasons to take this            CONTINUE taking these medications      clotrimazole-betamethasone 1-0.05% cream  Commonly known as: LOTRISONE     diphenoxylate-atropine 2.5-0.025 mg 2.5-0.025 mg per tablet  Commonly known as: LOMOTIL     folic acid 800 MCG Tab  Commonly known as: FOLVITE     levothyroxine 125 MCG tablet  Commonly known as: SYNTHROID     metoprolol succinate 25 MG 24 hr tablet  Commonly known as: TOPROL-XL     nystatin-triamcinolone ointment  Commonly known as: MYCOLOG     OCUVITE ORAL     TRELEGY ELLIPTA 200-62.5-25 mcg inhaler  Generic drug: fluticasone-umeclidin-vilanter     VITAMIN D ORAL     WOMEN'S DAILY FORMULA ORAL            STOP taking these medications      acetaminophen 500 MG tablet  Commonly known as: TYLENOL     albuterol 90 mcg/actuation inhaler  Commonly known as: PROVENTIL/VENTOLIN HFA     furosemide 40 MG tablet  Commonly known as: LASIX     hydroCHLOROthiazide 25 MG tablet  Commonly known as: HYDRODIURIL     lisinopriL 40 MG tablet  Commonly known as: PRINIVIL,ZESTRIL     potassium chloride 10 MEQ Cpsr  Commonly known as: MICRO-K               Where to Get Your Medications        These medications were sent to [facility] [address]  [address] [address] - [address] [facility]  [address] [address]      Phone: [phone]   gabapentin 100 MG capsule          Discharge Information:   Diet:  Diet renal  Diet Adult Regular    Physical Activity:  As tolerated             Instructions:  1. Take all medications as prescribed  2. Keep all follow-up appointments  3. Return to the hospital or call your primary care physicians if any worsening symptoms such as fever, chest pain, shortness of breath, return of symptoms, or any other  concerns.    Follow-Up Appointments:  PCP  Urology  Cardiology    Less than 30 minutes were spent coordinating discharge for this patient.     Rome Weaver M.D.  [facility] Neu[facility] PGY-1

## 2023-04-07 NOTE — PLAN OF CARE
04/07/23 1450   AVS Confirmation   Discharge instructions and AVS given to and reviewed with patient and/or significant other. Yes       AVS printed and handed to patient by bedside nurse. VN reviewed discharge instructions with patient and sister in law using teachback method.  Allowed time for questions, all questions answered.  Patient verbalized complete understanding of discharge instructions and voices no concerns. Discharge instructions complete. Bedside nurse notified.

## 2023-04-07 NOTE — PLAN OF CARE
SW was notified of possible dc today. Pt reports that upon dc her brother will provide transport home. Pt confirmed that she is current with [facility]. SW requested HH orders from MD. SW awaiting HH orders from MD.     SW requested PCP follow up.     Future Appointments   Date Time Provider Department Center   4/11/2023  9:30 AM Marcy Valdivia MD [facility]        04/07/23 0839   Final Note   Assessment Type Final Discharge Note   Anticipated Discharge Disposition [facility]   Hospital Resources/Appts/Education Provided Appointments scheduled by Navigator/Coordinator   Post-Acute Status   Discharge Delays None known at this time

## 2023-04-08 ENCOUNTER — NURSE TRIAGE (OUTPATIENT)
Dept: ADMINISTRATIVE | Facility: CLINIC | Age: 73
End: 2023-04-08
Payer: MEDICARE

## 2023-04-08 NOTE — TELEPHONE ENCOUNTER
Pts RACHELL calling with pt, states she was discharged yesterday and began with loose diarrhea about 6 times, nausea, and vomiting about 2/3 times, small amounts of yellow. Denies any fever. Asking for medication to be called in, advised of new medication policy, verbalized understanding, offered triage and pt accepted. Per protocol advised to be seen by HCP within 24 hours. verbalized understanding, but states she cannot do that, offered [facility], but declines, states she will try to get in touch with her PCP. verbalized understanding. Denies any further questions or concerns at this time, advised to call back if they have any that come up. Advised pt to call back with any other concerns or worsening symptoms. Verbalized understanding and will route message to provider.       Reason for Disposition   [1] MODERATE diarrhea (e.g., 4-6 times / day more than normal) AND [2] age > 70 years    Additional Information   Negative: Shock suspected (e.g., cold/pale/clammy skin, too weak to stand, low BP, rapid pulse)   Negative: Difficult to awaken or acting confused (e.g., disoriented, slurred speech)   Negative: Sounds like a life-threatening emergency to the triager   Negative: [1] SEVERE abdominal pain (e.g., excruciating) AND [2] present > 1 hour   Negative: [1] SEVERE abdominal pain AND [2] age > 60 years   Negative: [1] Blood in the stool AND [2] moderate or large amount of blood   Negative: Black or tarry bowel movements (Exception: chronic-unchanged black-grey bowel movements AND is taking iron pills or Pepto-bismol)   Negative: [1] Drinking very little AND [2] dehydration suspected (e.g., no urine > 12 hours, very dry mouth, very lightheaded)   Negative: Patient sounds very sick or weak to the triager   Negative: [1] SEVERE diarrhea (e.g., 7 or more times / day more than normal) AND [2] age > 60 years     6   Negative: [1] Constant abdominal pain AND [2] present > 2 hours   Negative: [1] Fever > 103 F (39.4 C) AND [2] not  able to get the fever down using Fever Care Advice   Negative: [1] SEVERE diarrhea (e.g., 7 or more times / day more than normal) AND [2] present > 24 hours (1 day)     6   Negative: [1] MODERATE diarrhea (e.g., 4-6 times / day more than normal) AND [2] present > 48 hours (2 days)    Protocols used: Diarrhea-A-AH

## 2023-04-10 LAB
BACTERIA BLD CULT: NORMAL
BACTERIA BLD CULT: NORMAL

## 2023-04-11 NOTE — PLAN OF CARE
[facility] - Telemetry      HOME HEALTH ORDERS  FACE TO FACE ENCOUNTER    Patient Name: Socorro Lucas  YOB: 1950    PCP: Pola Knox MD   PCP Address: [address] [place] / [place] [address]  PCP Phone Number: [phone]  PCP Fax: [phone]    Encounter Date: 4/5/23    Admit to Home Health    Diagnoses:  Active Hospital Problems    Diagnosis  POA    *Acute renal failure [N17.9]  Yes    Essential hypertension [I10]  Yes    Class 2 obesity due to excess calories without serious comorbidity in adult [E66.09]  Yes    Urinary retention [R33.9]  Yes     , 89 and on cystoscopy, has BL reflux   Cath placed 4/4/22  UDS 4/22:  No void, normal compliance bilateral grade 1 reflux        Resolved Hospital Problems    Diagnosis Date Resolved POA    Hyperkalemia [E87.5] 04/07/2023 Yes    Recurrent UTI [N39.0] 04/07/2023 Yes     Enterococcus 2/2022 treated  Retention as above with reflux           Follow Up Appointments:  No future appointments.    Allergies:  Review of patient's allergies indicates:   Allergen Reactions    Macrobid [nitrofurantoin monohyd/m-cryst] Swelling    Sulfa (sulfonamide antibiotics) Anaphylaxis    Asa [aspirin] Other (See Comments)    Codeine Nausea Only       Medications: Review discharge medications with patient and family and provide education.    No current facility-administered medications for this encounter.     Current Outpatient Medications   Medication Sig Dispense Refill    beta-carotene,A,-vits C,E/mins (OCUVITE ORAL) Take 1 tablet by mouth once daily.      clotrimazole-betamethasone 1-0.05% (LOTRISONE) cream 2 (two) times a day. to affected area      diphenoxylate-atropine 2.5-0.025 mg (LOMOTIL) 2.5-0.025 mg per tablet       ergocalciferol, vitamin D2, (VITAMIN D ORAL) Take 25 mcg by mouth once daily.      folic acid (FOLVITE) 800 MCG Tab Take 800 mcg by mouth once daily.      levothyroxine (SYNTHROID) 125 MCG tablet Take 125 mcg by mouth before breakfast.       metoprolol succinate (TOPROL-XL) 25 MG 24 hr tablet Take 25 mg by mouth once daily.      multivit with calcium,iron,min (WOMEN'S DAILY FORMULA ORAL) Take 1 tablet by mouth once daily.      nystatin-triamcinolone (MYCOLOG) ointment APPLY OINTMENT TOPICALLY TO AFFECTED AREA TWICE DAILY      TRELEGY ELLIPTA 200-62.5-25 mcg inhaler Inhale 1 puff into the lungs once daily.      gabapentin (NEURONTIN) 100 MG capsule Take 2 capsules (200 mg total) by mouth nightly as needed (pain). 180 capsule 0     Discharge Medication List as of 4/7/2023  1:51 PM        CONTINUE these medications which have CHANGED    Details   gabapentin (NEURONTIN) 100 MG capsule Take 2 capsules (200 mg total) by mouth nightly as needed (pain)., Starting Fri 4/7/2023, Until Thu 7/6/2023 at 2359, Normal           CONTINUE these medications which have NOT CHANGED    Details   beta-carotene,A,-vits C,E/mins (OCUVITE ORAL) Take 1 tablet by mouth once daily., Historical Med      clotrimazole-betamethasone 1-0.05% (LOTRISONE) cream 2 (two) times a day. to affected area, Starting Thu 6/2/2022, Historical Med      diphenoxylate-atropine 2.5-0.025 mg (LOMOTIL) 2.5-0.025 mg per tablet Starting Mon 2/7/2022, Historical Med      ergocalciferol, vitamin D2, (VITAMIN D ORAL) Take 25 mcg by mouth once daily., Historical Med      folic acid (FOLVITE) 800 MCG Tab Take 800 mcg by mouth once daily., Historical Med      levothyroxine (SYNTHROID) 125 MCG tablet Take 125 mcg by mouth before breakfast., Historical Med      metoprolol succinate (TOPROL-XL) 25 MG 24 hr tablet Take 25 mg by mouth once daily., Historical Med      multivit with calcium,iron,min (WOMEN'S DAILY FORMULA ORAL) Take 1 tablet by mouth once daily., Historical Med      nystatin-triamcinolone (MYCOLOG) ointment APPLY OINTMENT TOPICALLY TO AFFECTED AREA TWICE DAILY, Historical Med      TRELEGY ELLIPTA 200-62.5-25 mcg inhaler Inhale 1 puff into the lungs once daily., Starting Thu 12/16/2021, Historical  Med           STOP taking these medications       acetaminophen (TYLENOL) 500 MG tablet Comments:   Reason for Stopping:         albuterol (PROVENTIL/VENTOLIN HFA) 90 mcg/actuation inhaler Comments:   Reason for Stopping:         furosemide (LASIX) 40 MG tablet Comments:   Reason for Stopping:         hydrochlorothiazide (HYDRODIURIL) 25 MG tablet Comments:   Reason for Stopping:         lisinopriL (PRINIVIL,ZESTRIL) 40 MG tablet Comments:   Reason for Stopping:         potassium chloride (MICRO-K) 10 MEQ CpSR Comments:   Reason for Stopping:             I have seen and examined this patient within the last 30 days. My clinical findings that support the need for the home health skilled services and home bound status are the following:no   Weakness/numbness causing balance and gait disturbance due to COPD Exacerbation and Weakness/Debility making it taxing to leave home.  Medical restrictions requiring assistance of another human to leave home due to  Dyspnea on exertion (SOB) and Unstable ambulation.     Diet:   renal diet    Labs:  N/a    Referrals/ Consults   to evaluate for community resources/long-range planning.  Aide to provide assistance with personal care, ADLs, and vital signs.    Activities:   ambulate in house with assistance    Nursing:   Agency to admit patient within 24 hours of hospital discharge unless specified on physician order or at patient request    SN to complete comprehensive assessment including routine vital signs. Instruct on disease process and s/s of complications to report to MD. Review/verify medication list sent home with the patient at time of discharge  and instruct patient/caregiver as needed. Frequency may be adjusted depending on start of care date.     Skilled nurse to perform up to 3 visits PRN for symptoms related to diagnosis    Notify MD if SBP > 160 or < 90; DBP > 90 or < 50; HR > 120 or < 50; Temp > 101; O2 < 88%    Ok to schedule additional visits based on  staff availability and patient request on consecutive days within the home health episode.    When multiple disciplines ordered:    Start of Care occurs on Sunday - Wednesday schedule remaining discipline evaluations as ordered on separate consecutive days following the start of care.    Thursday SOC -schedule subsequent evaluations Friday and Monday the following week.     Friday - Saturday SOC - schedule subsequent discipline evaluations on consecutive days starting Monday of the following week.    For all post-discharge communication and subsequent orders please contact patient's primary care physician. If unable to reach primary care physician or do not receive response within 30 minutes, please contact [facility] for clinical staff order clarification    Miscellaneous   N/a    Home Health Aide:  Medical Social Work Weekly and Home Health Aide Weekly    Wound Care Orders  no    I certify that this patient is confined to her home.    Jude Mancera MD  [facility] Internal Medicine, HO-I  [facility] Hospitalist Team A    Medicine Service - Team A  [facility] Medicine Hospitalist Pager numbers:   [facility] Hospitalist Medicine Team A (Fadia/Christian): [phone]  [facility] Hospitalist Medicine Team B (Sara/Peter): [phone]

## 2023-04-12 ENCOUNTER — TELEPHONE (OUTPATIENT)
Dept: UROLOGY | Facility: CLINIC | Age: 73
End: 2023-04-12
Payer: MEDICARE

## 2023-04-12 DIAGNOSIS — M79.605 LEG PAIN, LEFT: Primary | ICD-10-CM

## 2023-04-12 NOTE — TELEPHONE ENCOUNTER
I returned the patient call.patient asked can she speak with galilea. I voiced that she isn't here for the day but she'll return tomorrow. Patient voiced that she'll speak with her tomorrow.

## 2023-04-12 NOTE — TELEPHONE ENCOUNTER
----- Message from Solomon Parra sent at 4/12/2023 12:25 PM CDT -----  Type:  Needs Medical Advice    Who Called: yeny  Reason:speak with nurse   Would the patient rather a call back or a response via [facility]? call  Best Call Back Number: [phone]  Additional Information: non

## 2023-04-13 ENCOUNTER — HOSPITAL ENCOUNTER (EMERGENCY)
Facility: HOSPITAL | Age: 73
Discharge: HOME | End: 2023-04-13
Attending: EMERGENCY MEDICINE
Payer: MEDICARE

## 2023-04-13 VITALS
HEIGHT: 61 IN | RESPIRATION RATE: 20 BRPM | TEMPERATURE: 98 F | SYSTOLIC BLOOD PRESSURE: 125 MMHG | HEART RATE: 70 BPM | DIASTOLIC BLOOD PRESSURE: 58 MMHG | OXYGEN SATURATION: 95 % | WEIGHT: 185 LBS | BODY MASS INDEX: 34.93 KG/M2

## 2023-04-13 DIAGNOSIS — Z01.89 ENCOUNTER FOR LABORATORY TEST: Primary | ICD-10-CM

## 2023-04-13 LAB
ALBUMIN SERPL BCP-MCNC: 3 G/DL (ref 3.5–5.2)
ALP SERPL-CCNC: 88 U/L (ref 55–135)
ALT SERPL W/O P-5'-P-CCNC: 21 U/L (ref 10–44)
ANION GAP SERPL CALC-SCNC: 8 MMOL/L (ref 8–16)
AST SERPL-CCNC: 31 U/L (ref 10–40)
BASOPHILS # BLD AUTO: 0.04 K/UL (ref 0–0.2)
BASOPHILS NFR BLD: 0.6 % (ref 0–1.9)
BILIRUB SERPL-MCNC: 0.8 MG/DL (ref 0.1–1)
BUN SERPL-MCNC: 49 MG/DL (ref 8–23)
CALCIUM SERPL-MCNC: 9.1 MG/DL (ref 8.7–10.5)
CHLORIDE SERPL-SCNC: 108 MMOL/L (ref 95–110)
CO2 SERPL-SCNC: 23 MMOL/L (ref 23–29)
CREAT SERPL-MCNC: 1.9 MG/DL (ref 0.5–1.4)
DIFFERENTIAL METHOD: ABNORMAL
EOSINOPHIL # BLD AUTO: 0.2 K/UL (ref 0–0.5)
EOSINOPHIL NFR BLD: 2.2 % (ref 0–8)
ERYTHROCYTE [DISTWIDTH] IN BLOOD BY AUTOMATED COUNT: 12.8 % (ref 11.5–14.5)
EST. GFR  (NO RACE VARIABLE): 28 ML/MIN/1.73 M^2
GLUCOSE SERPL-MCNC: 87 MG/DL (ref 70–110)
HCT VFR BLD AUTO: 34.7 % (ref 37–48.5)
HGB BLD-MCNC: 11.2 G/DL (ref 12–16)
IMM GRANULOCYTES # BLD AUTO: 0.04 K/UL (ref 0–0.04)
IMM GRANULOCYTES NFR BLD AUTO: 0.6 % (ref 0–0.5)
LYMPHOCYTES # BLD AUTO: 1.6 K/UL (ref 1–4.8)
LYMPHOCYTES NFR BLD: 22.7 % (ref 18–48)
MCH RBC QN AUTO: 30.9 PG (ref 27–31)
MCHC RBC AUTO-ENTMCNC: 32.3 G/DL (ref 32–36)
MCV RBC AUTO: 96 FL (ref 82–98)
MONOCYTES # BLD AUTO: 0.7 K/UL (ref 0.3–1)
MONOCYTES NFR BLD: 9.8 % (ref 4–15)
NEUTROPHILS # BLD AUTO: 4.6 K/UL (ref 1.8–7.7)
NEUTROPHILS NFR BLD: 64.1 % (ref 38–73)
NRBC BLD-RTO: 0 /100 WBC
PLATELET # BLD AUTO: 194 K/UL (ref 150–450)
PMV BLD AUTO: 10.6 FL (ref 9.2–12.9)
POTASSIUM SERPL-SCNC: 4.4 MMOL/L (ref 3.5–5.1)
PROT SERPL-MCNC: 7.1 G/DL (ref 6–8.4)
RBC # BLD AUTO: 3.62 M/UL (ref 4–5.4)
SODIUM SERPL-SCNC: 139 MMOL/L (ref 136–145)
WBC # BLD AUTO: 7.15 K/UL (ref 3.9–12.7)

## 2023-04-13 PROCEDURE — 85025 COMPLETE CBC W/AUTO DIFF WBC: CPT | Performed by: EMERGENCY MEDICINE

## 2023-04-13 PROCEDURE — 93010 ELECTROCARDIOGRAM REPORT: CPT | Mod: ,,, | Performed by: INTERNAL MEDICINE

## 2023-04-13 PROCEDURE — 99283 EMERGENCY DEPT VISIT LOW MDM: CPT

## 2023-04-13 PROCEDURE — 93010 EKG 12-LEAD: ICD-10-PCS | Mod: ,,, | Performed by: INTERNAL MEDICINE

## 2023-04-13 PROCEDURE — 93005 ELECTROCARDIOGRAM TRACING: CPT

## 2023-04-13 PROCEDURE — 80053 COMPREHEN METABOLIC PANEL: CPT | Performed by: EMERGENCY MEDICINE

## 2023-04-13 NOTE — TELEPHONE ENCOUNTER
Spoke with patient, she is feeling a lot better.  Leg bag changed during admission, she states she is not comfortable with it.  Requesting bag change on Tuesday since she will be in the area.  Nurse visit scheduled for 1045 hours.  Confirmed.

## 2023-04-13 NOTE — DISCHARGE INSTRUCTIONS
Your creatinine today is 1.9.  This is close to your baseline creatinine.    Follow-up with your primary care physician/nephrologist.  Please return if you have any symptoms of concern.  Thank you.    Thank you for choosing [facility]! We appreciate you trusting us with your medical care.     It is important to remember that some problems are difficult to diagnose and may not be found during your first visit. Be sure to follow up with your primary care doctor and review any labs/imaging that was performed during your visit with them. If you do not have a primary care doctor, you may contact the one listed on your discharge paperwork, or you may also call the [facility] Appointment Desk at [phone] to schedule an appointment.     All medications may potentially have side effects and it is impossible to predict which medications may give you side effects. If you feel that you are having a negative effect of any medication you should immediately stop taking them and seek medical attention. Do not drive or make any important decisions for 24 hours if you have received any pain medications, sedatives or mood altering drugs during your ER visit.    We will be happy to take care of you for all of your future medical needs. You may return to the ER at any time for any new/concerning symptoms, worsening condition, or failure to improve. We hope you feel better soon.     Geronimo Zelaya MD MPH  Emergency Medicine

## 2023-04-13 NOTE — ED PROVIDER NOTES
Emergency Department Encounter  Provider Note  Encounter Date: 4/13/2023    Patient Name: Socorro Lucas  MRN: [id number]    History of Present Illness   HPI  History of Present Illness:    Chief Complaint:   Chief Complaint   Patient presents with    Abnormal Lab     Recent hospitalization for worsening renal function after covid infection. Repeat labs yesterday reveal worsening renal function (EGFR-14, BUN-65,CR-3.3). Referred here by Nephrology.        72f sent to the ER for elev Cr.  Labs were done at her physicians lab and patient brought the results on paper, creatinine 3.3.  Feels fine and would not be here otherwise.  Denies chest pain or shortness of breath.  Was recently discharged from the hospital and states that she has been doing well, and had a follow-up with her PCP which is why the labs were drawn.    The following PMH/PSH/SocHx/FamHx has been reviewed by myself:    Past Medical History:   Diagnosis Date    CKD (chronic kidney disease) stage 4, GFR 15-29 ml/min     COPD (chronic obstructive pulmonary disease)     DVT (deep venous thrombosis) left leg post cholecystectomy    Hypercalcemia     Hypercalcemia     Hypertension     Hypokalemia     Hypothyroidism     Kidney stone     Thyroid disease     Urinary tract infection     Vaginal infection     Vitamin D deficiency      Past Surgical History:   Procedure Laterality Date    APPENDECTOMY      BREAST SURGERY      biopsy only x 6    CARPAL TUNNEL RELEASE Bilateral     CHOLECYSTECTOMY      CYSTOSCOPY      CYSTOSCOPY WITH CYSTOGRAPHY N/A 4/4/2022    Procedure: CYSTOSCOPY, WITH CYSTOGRAM;  Surgeon: Ashu Riley MD;  Location: [facility] OR;  Service: Urology;  Laterality: N/A;    CYSTOSCOPY WITH URODYNAMIC TESTING N/A 4/18/2022    Procedure: CYSTOSCOPY, WITH URODYNAMIC TESTING FLOUROSCOPIC;  Surgeon: Trae Em MD;  Location: [address];  Service: Urology;  Laterality: N/A;  1hr    CHARI FILTER PLACEMENT      INSERTION OF URETERAL  CATHETER N/A 2022    Procedure: INSERTION, CATHETER, URETER;  Surgeon: Ashu Riley MD;  Location: [facility] OR;  Service: Urology;  Laterality: N/A;    INSERTION OF URETERAL CATHETER  2022    Procedure: ;  Surgeon: Ashu Riley MD;  Location: [facility] OR;  Service: Urology;;    RETROGRADE PYELOGRAPHY Bilateral 2022    Procedure: PYELOGRAM, RETROGRADE;  Surgeon: Ashu Riley MD;  Location: [facility] OR;  Service: Urology;  Laterality: Bilateral;    URETEROSCOPY Left 2022    Procedure: URETEROSCOPY;  Surgeon: Ashu Riley MD;  Location: [facility] OR;  Service: Urology;  Laterality: Left;     Social History     Tobacco Use    Smoking status: Former     Types: Cigarettes     Quit date: 1996     Years since quittin.3    Smokeless tobacco: Never   Substance Use Topics    Alcohol use: No     Alcohol/week: 0.0 standard drinks    Drug use: No     Family History   Problem Relation Age of Onset    No Known Problems Mother     No Known Problems Father     Kidney disease Neg Hx        Allergies reviewed:  Review of patient's allergies indicates:   Allergen Reactions    Macrobid [nitrofurantoin monohyd/m-cryst] Swelling    Sulfa (sulfonamide antibiotics) Anaphylaxis    Asa [aspirin] Other (See Comments)    Codeine Nausea Only       Medications reviewed:  Discharge Medication List as of 2023  5:43 PM        CONTINUE these medications which have NOT CHANGED    Details   beta-carotene,A,-vits C,E/mins (OCUVITE ORAL) Take 1 tablet by mouth once daily., Historical Med      clotrimazole-betamethasone 1-0.05% (LOTRISONE) cream 2 (two) times a day. to affected area, Starting Thu 2022, Historical Med      diphenoxylate-atropine 2.5-0.025 mg (LOMOTIL) 2.5-0.025 mg per tablet Starting Mon 2022, Historical Med      ergocalciferol, vitamin D2, (VITAMIN D ORAL) Take 25 mcg by mouth once daily., Historical Med      folic acid (FOLVITE) 800 MCG Tab Take 800 mcg by mouth once daily., Historical Med       gabapentin (NEURONTIN) 100 MG capsule Take 2 capsules (200 mg total) by mouth nightly as needed (pain)., Starting Fri 4/7/2023, Until Thu 7/6/2023 at 2359, Normal      levothyroxine (SYNTHROID) 125 MCG tablet Take 125 mcg by mouth before breakfast., Historical Med      metoprolol succinate (TOPROL-XL) 25 MG 24 hr tablet Take 25 mg by mouth once daily., Historical Med      multivit with calcium,iron,min (WOMEN'S DAILY FORMULA ORAL) Take 1 tablet by mouth once daily., Historical Med      nystatin-triamcinolone (MYCOLOG) ointment APPLY OINTMENT TOPICALLY TO AFFECTED AREA TWICE DAILY, Historical Med      TRELEGY ELLIPTA 200-62.5-25 mcg inhaler Inhale 1 puff into the lungs once daily., Starting Thu 12/16/2021, Historical Med             ROS  Review of Systems:    Constitutional:  Negative for fever.   HENT:  Negative for sore throat.    Eyes:  Negative for redness.   Respiratory:  Negative for shortness of breath.    Cardiovascular:  Negative for chest pain.   Gastrointestinal:  Negative for nausea.   Genitourinary:  Negative for dysuria.   Musculoskeletal:  Negative for back pain.   Skin:  Negative for rash.   Neurological:  Negative for weakness.   Hematological:  Does not bruise/bleed easily.   Psychiatric/Behavioral:  The patient is not nervous/anxious.      Physical Exam   Physical Exam    Initial Vitals [04/13/23 1358]   BP Pulse Resp Temp SpO2   (!) 125/58 74 20 97.5 °F (36.4 °C) (!) 92 %      MAP       --           Triage vital signs reviewed.    Constitutional: Well-nourished, well-developed. Not in acute distress.  HENT: Normocephalic, atraumatic. Moist mucous membranes.  Eyes: No conjunctival injection.  Resp: Normal respiratory effort, breathing unlabored.  Cardio: Regular rate and rhythm.  GI: Abdomen non-distended.   MSK: Extremities without any deformities noted. No lower extremity edema.  Skin: Warm and dry. No rashes or lesions noted.  Neuro: Awake and alert. Moves all extremities.    ED Course    Procedures    Medical Decision Making    History Acquisition     The history is provided by the patient.     Review of prior external/non ED notes:  Recent discharge summary    Differential Diagnoses   Based on available information and initial assessment, the working differential diagnosis includes elevated creatinine, MINDI, infection.    EKG   EKG ordered and independently reviewed by me:       Labs   Lab tests ordered and independently reviewed by me:    Labs Reviewed   CBC W/ AUTO DIFFERENTIAL - Abnormal; Notable for the following components:       Result Value    RBC 3.62 (*)     Hemoglobin 11.2 (*)     Hematocrit 34.7 (*)     Immature Granulocytes 0.6 (*)     All other components within normal limits   COMPREHENSIVE METABOLIC PANEL - Abnormal; Notable for the following components:    BUN 49 (*)     Creatinine 1.9 (*)     Albumin 3.0 (*)     eGFR 28 (*)     All other components within normal limits         Imaging   Imaging ordered and independently reviewed by me:   Imaging Results    None              Additional Consideration   Socorro Lucas  presents to the emergency Department today with evaluation for concern of possible elevated creatinine.  Will recheck labs to see if her Cr is indeed elevated. 3.3 yesterday at outside lab (PCP not in the [facility] system). S/o given to Dr Zelaya at shift change pending results.     Additional testing considered but not indicated during the course of this workup: further imaging and labwork, not indicated  Co-morbidities/chronic illness/exacerbation of chronic illness considered which impacted clinical decision making:  Hypertension, CHF  Procedures done in the ED or plan for the OR: No  Social determinants of care considered during development of treatment plan include:  None  Discussion of management or test interpretation with external provider: Yes, describe:  Oncoming physician at shift change  DNR discussion: No    The patient's list of active medications and  allergies as documented per RN staff has been reviewed.  Medications given in the ED and/or prescribed: Medications - No data to display               Explanation of disposition:  Disposition pending results of creatinine, sign-out given to Dr. Zelaya at shift change.    Clinical Impression:     1. Encounter for laboratory test         ED Disposition Condition    Discharge Stable                         Myla Storm MD  04/14/23 2896

## 2023-04-13 NOTE — PROVIDER PROGRESS NOTES - EMERGENCY DEPT.
Encounter Date: 4/13/2023    ED Physician Progress Notes        Physician Note:   This is an assumption of care note.     Upon shift change, the patient was transferred to me from Dr. Storm @ 5 PM in stable condition.     Patient presented to ED with chief complaint of elevated creatinine on lab work    Update:   No acute events during shift.  Plan at shift change was to follow-up on her CMP for disposition.    CMP reviewed.  Creatinine 1.9.  This is close to patient's baseline creatinine was she was discharged.  On reassessment patient reports no complaints.  She would like to go process was identified today.  She follow-up closely with her PCP/nephrologist.  We discussed return precautions for any concerns.    After taking into careful account the historical factors and physical exam findings of the patient's presentation today, in conjunction with the empirical and objective data obtained on ED workup, no acute emergent medical condition has been identified. The patient appears to be low risk for an emergent medical condition and I feel it is safe and appropriate at this time for the patient to be discharged to follow-up as detailed in their discharge instructions for reevaluation and possible continued outpatient workup and management. I have discussed the specifics of the workup with the patient and the patient has verbalized understanding of the details of the workup, the diagnosis, the treatment plan, and the need for outpatient follow-up.  Although the patient has no emergent etiology today this does not preclude the development of an emergent condition so in addition, I have advised the patient that they can return to the ED and/or activate EMS at any time with worsening of their symptoms, change of their symptoms, or with any other medical complaint.  The patient remained comfortable and stable during their visit in the ED.  Discharge and follow-up instructions discussed with the patient who expressed  understanding and willingness to comply with my recommendations.            IMAGING:  Imaging Results    None         LABS:  Labs Reviewed  CBC W/ AUTO DIFFERENTIAL - Abnormal; Notable for the following components:     RBC                           3.62 (*)               Hemoglobin                    11.2 (*)               Hematocrit                    34.7 (*)               Immature Granulocytes         0.6 (*)             All other components within normal limits  COMPREHENSIVE METABOLIC PANEL - Abnormal; Notable for the following components:     BUN                           49 (*)                 Creatinine                    1.9 (*)                Albumin                       3.0 (*)                eGFR                          28 (*)              All other components within normal limits      MEDICATIONS:  Medications - No data to display      IMPRESSION:  Labor abnormality  (primary encounter diagnosis)       DISCHARGE MEDICATIONS:  New Prescriptions  No medications on file      DISPOSITION:  Discharge

## 2023-04-13 NOTE — ED NOTES
Review of patient's allergies indicates:   Allergen Reactions    Macrobid [nitrofurantoin monohyd/m-cryst] Swelling    Sulfa (sulfonamide antibiotics) Anaphylaxis    Asa [aspirin] Other (See Comments)    Codeine Nausea Only       APPEARANCE: Alert, oriented x 4, and in no acute distress.  NEURO: 5/5 strength major flexors/extensors bilaterally. Sensory intact to light touch bilaterally. Rosa coma scale: eyes open spontaneously-4, oriented & converses-5, obeys commands-6. No neurological abnormalities. Denies HA, dizziness, photophobia.  CARDIAC: Normal rate and rhythm through apical/radial pulse, S1 and S2 noted, denies CP.   RESPIRATORY:Normal rate and effort, breath sounds clear bilaterally throughout chest anterior and posterior. Respirations are equal and unlabored, no obvious signs of distress. No SOB reported.  PERIPHERAL VASCULAR: +2 peripheral pulses present. Normal cap refill <3sec. No edema. Warm to touch.   GASTRO: Abdomen soft, flat, bowel sounds normal and active in all 4 quadrants, no tenderness, no abdominal distention. Denies NVD.  MUSC: Full ROM. No bony tenderness or soft tissue tenderness. No obvious deformity.  SKIN: Skin is warm and dry, normal skin turgor, mucous membranes moist.   GENITOURINARY: +chino     Patient changed into hospital gown with assistance. Side rails x 2, bed low and locked position, call light in reach and instructed how to use. Pt educated on fall risk and verbalized understanding. Cardiac monitor, pulse ox, and automatic BP cuff applied; alarms set and audible.

## 2023-04-13 NOTE — ED NOTES
Pt provided warm blankets, denies wants/needs at this time, SR x 2, visitor at bs, monitoring devices in place, call light remains in reach and instructed how to use.

## 2023-04-18 ENCOUNTER — CLINICAL SUPPORT (OUTPATIENT)
Dept: UROLOGY | Facility: CLINIC | Age: 73
End: 2023-04-18
Payer: MEDICARE

## 2023-04-18 PROCEDURE — 99999 PR PBB SHADOW E&M-EST. PATIENT-LVL II: CPT | Mod: PBBFAC,,,

## 2023-04-18 PROCEDURE — 99999 PR PBB SHADOW E&M-EST. PATIENT-LVL II: ICD-10-PCS | Mod: PBBFAC,,,

## 2023-04-18 RX ORDER — ONDANSETRON 4 MG/1
TABLET, ORALLY DISINTEGRATING ORAL
COMMUNITY
Start: 2023-04-10 | End: 2023-09-25 | Stop reason: ALTCHOICE

## 2023-04-18 NOTE — PROGRESS NOTES
Rodriguez bag exchanged.  Teaching provided.  Patient voiced understanding, no further questions.

## 2023-05-04 ENCOUNTER — OFFICE VISIT (OUTPATIENT)
Dept: UROLOGY | Facility: CLINIC | Age: 73
End: 2023-05-04
Payer: MEDICARE

## 2023-05-04 DIAGNOSIS — R33.9 URINARY RETENTION: Primary | ICD-10-CM

## 2023-05-04 PROCEDURE — 4010F ACE/ARB THERAPY RXD/TAKEN: CPT | Mod: CPTII,S$GLB,, | Performed by: UROLOGY

## 2023-05-04 PROCEDURE — 99213 OFFICE O/P EST LOW 20 MIN: CPT | Mod: S$GLB,,, | Performed by: UROLOGY

## 2023-05-04 PROCEDURE — 1159F MED LIST DOCD IN RCRD: CPT | Mod: CPTII,S$GLB,, | Performed by: UROLOGY

## 2023-05-04 PROCEDURE — 1160F RVW MEDS BY RX/DR IN RCRD: CPT | Mod: CPTII,S$GLB,ICN, | Performed by: UROLOGY

## 2023-05-04 PROCEDURE — 99999 PR PBB SHADOW E&M-EST. PATIENT-LVL I: CPT | Mod: PBBFAC,,, | Performed by: UROLOGY

## 2023-05-04 PROCEDURE — 99213 PR OFFICE/OUTPT VISIT, EST, LEVL III, 20-29 MIN: ICD-10-PCS | Mod: S$GLB,,, | Performed by: UROLOGY

## 2023-05-04 PROCEDURE — 1111F DSCHRG MED/CURRENT MED MERGE: CPT | Mod: CPTII,S$GLB,, | Performed by: UROLOGY

## 2023-05-04 PROCEDURE — 1160F PR REVIEW ALL MEDS BY PRESCRIBER/CLIN PHARMACIST DOCUMENTED: ICD-10-PCS | Mod: CPTII,S$GLB,ICN, | Performed by: UROLOGY

## 2023-05-04 PROCEDURE — 99999 PR PBB SHADOW E&M-EST. PATIENT-LVL I: ICD-10-PCS | Mod: PBBFAC,,, | Performed by: UROLOGY

## 2023-05-04 PROCEDURE — 1159F PR MEDICATION LIST DOCUMENTED IN MEDICAL RECORD: ICD-10-PCS | Mod: CPTII,S$GLB,, | Performed by: UROLOGY

## 2023-05-04 PROCEDURE — 3066F NEPHROPATHY DOC TX: CPT | Mod: CPTII,S$GLB,, | Performed by: UROLOGY

## 2023-05-04 PROCEDURE — 4010F PR ACE/ARB THEARPY RXD/TAKEN: ICD-10-PCS | Mod: CPTII,S$GLB,, | Performed by: UROLOGY

## 2023-05-04 PROCEDURE — 3066F PR DOCUMENTATION OF TREATMENT FOR NEPHROPATHY: ICD-10-PCS | Mod: CPTII,S$GLB,, | Performed by: UROLOGY

## 2023-05-04 PROCEDURE — 1111F PR DISCHARGE MEDS RECONCILED W/ CURRENT OUTPATIENT MED LIST: ICD-10-PCS | Mod: CPTII,S$GLB,, | Performed by: UROLOGY

## 2023-05-04 PROCEDURE — 99211 OFF/OP EST MAY X REQ PHY/QHP: CPT | Mod: PBBFAC,PO | Performed by: UROLOGY

## 2023-05-04 NOTE — PROGRESS NOTES
Subjective:       Patient ID: Socorro Lucas is a 72 y.o. female.    Chief Complaint: No chief complaint on file.     This is a 72 y.o.  female patient that is an established patient. The patient was referred to me by PCP for multiple issues: recurrent UTI, dysuria, hematuria, h/o stones.  H/o recurrent UTIs, no cultures available.  States currently with pain to lower abdomen.  No flank pain.  Recently had hematuria with UTI per report, dark red urine with small clots, past smoker.  Reports UTI every 1-2 months and treated with antibiotic by PCP.  Symptoms: abd pain, frequency, urgency, gross hematuria.  H/o stones in 2015 and 2016 requiring intervention,  Started K citrate at the time.       PVR elevated, chronic Rodriguez catheter placed.    Monthly cath changes    Lab Results   Component Value Date    CREATININE 1.9 (H) 04/13/2023       ---  Past Medical History:   Diagnosis Date    CKD (chronic kidney disease) stage 4, GFR 15-29 ml/min     COPD (chronic obstructive pulmonary disease)     DVT (deep venous thrombosis) left leg post cholecystectomy    Hypercalcemia     Hypercalcemia     Hypertension     Hypokalemia     Hypothyroidism     Kidney stone     Thyroid disease     Urinary tract infection     Vaginal infection     Vitamin D deficiency        Past Surgical History:   Procedure Laterality Date    APPENDECTOMY      BREAST SURGERY      biopsy only x 6    CARPAL TUNNEL RELEASE Bilateral     CHOLECYSTECTOMY      CYSTOSCOPY      CYSTOSCOPY WITH CYSTOGRAPHY N/A 4/4/2022    Procedure: CYSTOSCOPY, WITH CYSTOGRAM;  Surgeon: Ashu Riley MD;  Location: [facility];  Service: Urology;  Laterality: N/A;    CYSTOSCOPY WITH URODYNAMIC TESTING N/A 4/18/2022    Procedure: CYSTOSCOPY, WITH URODYNAMIC TESTING FLOUROSCOPIC;  Surgeon: Trae Em MD;  Location: [facility] OR [address] [place] [address];  Service: Urology;  Laterality: N/A;  1hr    CHARI FILTER PLACEMENT      INSERTION OF URETERAL CATHETER N/A  2022    Procedure: INSERTION, CATHETER, URETER;  Surgeon: Ashu Riley MD;  Location: [facility] OR;  Service: Urology;  Laterality: N/A;    INSERTION OF URETERAL CATHETER  2022    Procedure: ;  Surgeon: Ashu Riley MD;  Location: [facility] OR;  Service: Urology;;    RETROGRADE PYELOGRAPHY Bilateral 2022    Procedure: PYELOGRAM, RETROGRADE;  Surgeon: Ashu Riley MD;  Location: [facility] OR;  Service: Urology;  Laterality: Bilateral;    URETEROSCOPY Left 2022    Procedure: URETEROSCOPY;  Surgeon: Ashu Riley MD;  Location: [facility] OR;  Service: Urology;  Laterality: Left;       Family History   Problem Relation Age of Onset    No Known Problems Mother     No Known Problems Father     Kidney disease Neg Hx        Social History     Tobacco Use    Smoking status: Former     Types: Cigarettes     Quit date: 1996     Years since quittin.3    Smokeless tobacco: Never   Substance Use Topics    Alcohol use: No     Alcohol/week: 0.0 standard drinks    Drug use: No       Current Outpatient Medications on File Prior to Visit   Medication Sig Dispense Refill    acetaminophen (TYLENOL) 325 MG tablet Take 325 mg by mouth as needed for Pain.      beta-carotene,A,-vits C,E/mins (OCUVITE ORAL) Take 1 tablet by mouth once daily.      clotrimazole-betamethasone 1-0.05% (LOTRISONE) cream 2 (two) times a day. to affected area      diphenoxylate-atropine 2.5-0.025 mg (LOMOTIL) 2.5-0.025 mg per tablet       ergocalciferol, vitamin D2, (VITAMIN D ORAL) Take 25 mcg by mouth once daily.      folic acid (FOLVITE) 800 MCG Tab Take 800 mcg by mouth once daily.      gabapentin (NEURONTIN) 100 MG capsule Take 2 capsules (200 mg total) by mouth nightly as needed (pain). 180 capsule 0    levothyroxine (SYNTHROID) 125 MCG tablet Take 125 mcg by mouth before breakfast.      metoprolol succinate (TOPROL-XL) 25 MG 24 hr tablet Take 25 mg by mouth once daily.      multivit with calcium,iron,min  (WOMEN'S DAILY FORMULA ORAL) Take 1 tablet by mouth once daily.      nystatin-triamcinolone (MYCOLOG) ointment APPLY OINTMENT TOPICALLY TO AFFECTED AREA TWICE DAILY      ondansetron (ZOFRAN-ODT) 4 MG TbDL DISSOLVE 1 TABLET IN MOUTH THREE TIMES DAILY AS NEEDED      TRELEGY ELLIPTA 200-62.5-25 mcg inhaler Inhale 1 puff into the lungs once daily.       No current facility-administered medications on file prior to visit.       Review of patient's allergies indicates:   Allergen Reactions    Macrobid [nitrofurantoin monohyd/m-cryst] Swelling    Sulfa (sulfonamide antibiotics) Anaphylaxis    Asa [aspirin] Other (See Comments)    Codeine Nausea Only       Review of Systems   Constitutional:  Negative for activity change, appetite change, chills and fever.   Respiratory:  Negative for cough, chest tightness and shortness of breath.    Cardiovascular:  Negative for chest pain.   Gastrointestinal:  Negative for abdominal distention, abdominal pain, nausea and vomiting.   Genitourinary:  Positive for difficulty urinating. Negative for dysuria, flank pain, frequency, hematuria, pelvic pain and urgency.   Neurological:  Negative for dizziness.   Hematological:  Does not bruise/bleed easily.   Psychiatric/Behavioral:  Negative for agitation.      Objective:      Physical Exam  HENT:      Head: Normocephalic.   Cardiovascular:      Pulses: Normal pulses.   Pulmonary:      Effort: Pulmonary effort is normal.   Abdominal:      General: Abdomen is flat. There is no distension.      Palpations: Abdomen is soft.      Tenderness: There is no abdominal tenderness. There is no right CVA tenderness, left CVA tenderness or guarding.   Musculoskeletal:      Cervical back: Normal range of motion.   Skin:     General: Skin is warm.   Neurological:      General: No focal deficit present.      Mental Status: She is alert.         No results found for this or any previous visit (from the past 2160 hour(s)).  UDS:       No void, normal  compliance bilateral grade 1 reflux    Assessment:     Problem Noted   Gross Hematuria 2/14/2022    With recent UTI per patient no culture available, past smoker.   --CTU 2/22: bilateral hydroureter, abrupt contrast cut off on left, distended bladder/trabeculation  --4/4/22 cysto, bilateral RPGs, left URS, cystogram--findings no left lesions, chronic cystitis, bilateral reflux w/o significant hydronephrosis     Urinary Retention 4/12/2022    , 280 and on cystoscopy, has BL reflux   Cath placed 4/4/22  UDS 4/22:  No void, normal compliance bilateral grade 1 reflux     Kidney Stones 10/7/2015    Treatment in 2015 and 2016, was on K citra in past  2mm left stone on CTU 2/2022     Recurrent Uti (Resolved) 2/14/2022    Enterococcus 2/2022 treated  Retention as above with reflux     Nephrolithiasis (Resolved) 9/14/2016   Lesion of Bladder (Resolved) 9/14/2016       Plan:     1.  16 Kinyarwanda Rodriguez changed by nurse Hudson  2.  Follow up in 1 month for change     Ashu Riley MD

## 2023-05-04 NOTE — PROGRESS NOTES
Chino exchange.  Under sterile technique, 16 Fr chino inserted into urethra without difficulty.  Straw colored urine returned.  Balloon inflated with 10 cc of sterile water, connected to leg bag at right thigh.  Only bottom strap connected as top strap too tight per patient.  Offered large urinary bag, patient declined at this time.

## 2023-05-06 ENCOUNTER — LAB VISIT (OUTPATIENT)
Dept: LAB | Facility: HOSPITAL | Age: 73
End: 2023-05-06
Attending: INTERNAL MEDICINE
Payer: MEDICARE

## 2023-05-06 DIAGNOSIS — N18.31 STAGE 3A CHRONIC KIDNEY DISEASE: ICD-10-CM

## 2023-05-06 DIAGNOSIS — D63.1 ANEMIA IN STAGE 3A CHRONIC KIDNEY DISEASE: ICD-10-CM

## 2023-05-06 DIAGNOSIS — N18.31 ANEMIA IN STAGE 3A CHRONIC KIDNEY DISEASE: ICD-10-CM

## 2023-05-06 LAB
ALBUMIN SERPL BCP-MCNC: 3.3 G/DL (ref 3.5–5.2)
ANION GAP SERPL CALC-SCNC: 9 MMOL/L (ref 8–16)
BASOPHILS # BLD AUTO: 0.04 K/UL (ref 0–0.2)
BASOPHILS NFR BLD: 0.6 % (ref 0–1.9)
BUN SERPL-MCNC: 12 MG/DL (ref 8–23)
CALCIUM SERPL-MCNC: 9.7 MG/DL (ref 8.7–10.5)
CHLORIDE SERPL-SCNC: 108 MMOL/L (ref 95–110)
CO2 SERPL-SCNC: 23 MMOL/L (ref 23–29)
CREAT SERPL-MCNC: 1.1 MG/DL (ref 0.5–1.4)
DIFFERENTIAL METHOD: ABNORMAL
EOSINOPHIL # BLD AUTO: 0.2 K/UL (ref 0–0.5)
EOSINOPHIL NFR BLD: 3 % (ref 0–8)
ERYTHROCYTE [DISTWIDTH] IN BLOOD BY AUTOMATED COUNT: 13.7 % (ref 11.5–14.5)
EST. GFR  (NO RACE VARIABLE): 53 ML/MIN/1.73 M^2
FERRITIN SERPL-MCNC: 127 NG/ML (ref 20–300)
GLUCOSE SERPL-MCNC: 98 MG/DL (ref 70–110)
HCT VFR BLD AUTO: 38.7 % (ref 37–48.5)
HGB BLD-MCNC: 12.2 G/DL (ref 12–16)
IMM GRANULOCYTES # BLD AUTO: 0.04 K/UL (ref 0–0.04)
IMM GRANULOCYTES NFR BLD AUTO: 0.6 % (ref 0–0.5)
IRON SERPL-MCNC: 65 UG/DL (ref 30–160)
LYMPHOCYTES # BLD AUTO: 1.3 K/UL (ref 1–4.8)
LYMPHOCYTES NFR BLD: 20.1 % (ref 18–48)
MAGNESIUM SERPL-MCNC: 2 MG/DL (ref 1.6–2.6)
MCH RBC QN AUTO: 30.8 PG (ref 27–31)
MCHC RBC AUTO-ENTMCNC: 31.5 G/DL (ref 32–36)
MCV RBC AUTO: 98 FL (ref 82–98)
MONOCYTES # BLD AUTO: 0.6 K/UL (ref 0.3–1)
MONOCYTES NFR BLD: 9.4 % (ref 4–15)
NEUTROPHILS # BLD AUTO: 4.2 K/UL (ref 1.8–7.7)
NEUTROPHILS NFR BLD: 66.3 % (ref 38–73)
NRBC BLD-RTO: 0 /100 WBC
PHOSPHATE SERPL-MCNC: 2.4 MG/DL (ref 2.7–4.5)
PLATELET # BLD AUTO: 175 K/UL (ref 150–450)
PMV BLD AUTO: 10 FL (ref 9.2–12.9)
POTASSIUM SERPL-SCNC: 4.7 MMOL/L (ref 3.5–5.1)
PTH-INTACT SERPL-MCNC: 59.4 PG/ML (ref 9–77)
RBC # BLD AUTO: 3.96 M/UL (ref 4–5.4)
SATURATED IRON: 23 % (ref 20–50)
SODIUM SERPL-SCNC: 140 MMOL/L (ref 136–145)
TOTAL IRON BINDING CAPACITY: 287 UG/DL (ref 250–450)
TRANSFERRIN SERPL-MCNC: 194 MG/DL (ref 200–375)
URATE SERPL-MCNC: 6 MG/DL (ref 2.4–5.7)
WBC # BLD AUTO: 6.28 K/UL (ref 3.9–12.7)

## 2023-05-06 PROCEDURE — 36415 COLL VENOUS BLD VENIPUNCTURE: CPT | Performed by: INTERNAL MEDICINE

## 2023-05-06 PROCEDURE — 83735 ASSAY OF MAGNESIUM: CPT | Performed by: INTERNAL MEDICINE

## 2023-05-06 PROCEDURE — 84466 ASSAY OF TRANSFERRIN: CPT | Performed by: INTERNAL MEDICINE

## 2023-05-06 PROCEDURE — 85025 COMPLETE CBC W/AUTO DIFF WBC: CPT | Performed by: INTERNAL MEDICINE

## 2023-05-06 PROCEDURE — 83970 ASSAY OF PARATHORMONE: CPT | Performed by: INTERNAL MEDICINE

## 2023-05-06 PROCEDURE — 84550 ASSAY OF BLOOD/URIC ACID: CPT | Performed by: INTERNAL MEDICINE

## 2023-05-06 PROCEDURE — 82728 ASSAY OF FERRITIN: CPT | Performed by: INTERNAL MEDICINE

## 2023-05-06 PROCEDURE — 80069 RENAL FUNCTION PANEL: CPT | Performed by: INTERNAL MEDICINE

## 2023-05-22 ENCOUNTER — HOSPITAL ENCOUNTER (OUTPATIENT)
Dept: RADIOLOGY | Facility: HOSPITAL | Age: 73
Discharge: HOME | End: 2023-05-22
Attending: SURGERY
Payer: MEDICARE

## 2023-05-22 DIAGNOSIS — E66.09 CLASS 2 OBESITY DUE TO EXCESS CALORIES WITHOUT SERIOUS COMORBIDITY WITH BODY MASS INDEX (BMI) OF 39.0 TO 39.9 IN ADULT: ICD-10-CM

## 2023-05-22 DIAGNOSIS — I10 ESSENTIAL HYPERTENSION: ICD-10-CM

## 2023-05-22 DIAGNOSIS — R89.9 ABNORMAL LABORATORY TEST: Primary | ICD-10-CM

## 2023-05-22 DIAGNOSIS — M79.89 LEG SWELLING: ICD-10-CM

## 2023-05-22 PROCEDURE — 93925 LOWER EXTREMITY STUDY: CPT | Mod: 26,,, | Performed by: RADIOLOGY

## 2023-05-22 PROCEDURE — 93925 LOWER EXTREMITY STUDY: CPT | Mod: TC

## 2023-05-22 PROCEDURE — 93925 US LOWER EXTREMITY ARTERIES BILATERAL: ICD-10-PCS | Mod: 26,,, | Performed by: RADIOLOGY

## 2023-05-31 ENCOUNTER — CLINICAL SUPPORT (OUTPATIENT)
Dept: UROLOGY | Facility: CLINIC | Age: 73
End: 2023-05-31
Payer: MEDICARE

## 2023-05-31 VITALS — HEART RATE: 65 BPM | SYSTOLIC BLOOD PRESSURE: 107 MMHG | DIASTOLIC BLOOD PRESSURE: 69 MMHG

## 2023-05-31 PROCEDURE — 99999 PR PBB SHADOW E&M-EST. PATIENT-LVL III: CPT | Mod: PBBFAC,,,

## 2023-05-31 PROCEDURE — 99999 PR PBB SHADOW E&M-EST. PATIENT-LVL III: ICD-10-PCS | Mod: PBBFAC,,,

## 2023-05-31 NOTE — PROGRESS NOTES
Chino exchange.  Under sterile technique, 16 Fr chino inserted into urethra without difficulty.  Straw colored urine returned.  Balloon inflated with 10 cc of sterile water, connected to leg bag at right thigh.  Only bottom strap connected.  Patient tolerated well.  Next chino exchange scheduled with patient.

## 2023-06-24 ENCOUNTER — HOSPITAL ENCOUNTER (EMERGENCY)
Facility: HOSPITAL | Age: 73
Discharge: HOME | End: 2023-06-24
Attending: EMERGENCY MEDICINE
Payer: MEDICARE

## 2023-06-24 VITALS
TEMPERATURE: 98 F | SYSTOLIC BLOOD PRESSURE: 142 MMHG | RESPIRATION RATE: 19 BRPM | OXYGEN SATURATION: 99 % | HEART RATE: 65 BPM | DIASTOLIC BLOOD PRESSURE: 67 MMHG

## 2023-06-24 DIAGNOSIS — N30.01 ACUTE CYSTITIS WITH HEMATURIA: Primary | ICD-10-CM

## 2023-06-24 DIAGNOSIS — T83.9XXA FOLEY CATHETER PROBLEM, INITIAL ENCOUNTER: ICD-10-CM

## 2023-06-24 LAB
ALBUMIN SERPL BCP-MCNC: 3.8 G/DL (ref 3.5–5.2)
ALP SERPL-CCNC: 83 U/L (ref 55–135)
ALT SERPL W/O P-5'-P-CCNC: 15 U/L (ref 10–44)
ANION GAP SERPL CALC-SCNC: 12 MMOL/L (ref 8–16)
AST SERPL-CCNC: 26 U/L (ref 10–40)
BACTERIA #/AREA URNS HPF: ABNORMAL /HPF
BASOPHILS # BLD AUTO: 0.02 K/UL (ref 0–0.2)
BASOPHILS NFR BLD: 0.3 % (ref 0–1.9)
BILIRUB SERPL-MCNC: 0.9 MG/DL (ref 0.1–1)
BILIRUB UR QL STRIP: NEGATIVE
BUN SERPL-MCNC: 39 MG/DL (ref 8–23)
CALCIUM SERPL-MCNC: 10.4 MG/DL (ref 8.7–10.5)
CHLORIDE SERPL-SCNC: 105 MMOL/L (ref 95–110)
CLARITY UR: ABNORMAL
CO2 SERPL-SCNC: 26 MMOL/L (ref 23–29)
COLOR UR: YELLOW
CREAT SERPL-MCNC: 1.8 MG/DL (ref 0.5–1.4)
DIFFERENTIAL METHOD: ABNORMAL
EOSINOPHIL # BLD AUTO: 0.2 K/UL (ref 0–0.5)
EOSINOPHIL NFR BLD: 2.2 % (ref 0–8)
ERYTHROCYTE [DISTWIDTH] IN BLOOD BY AUTOMATED COUNT: 12.7 % (ref 11.5–14.5)
EST. GFR  (NO RACE VARIABLE): 30 ML/MIN/1.73 M^2
GLUCOSE SERPL-MCNC: 104 MG/DL (ref 70–110)
GLUCOSE UR QL STRIP: NEGATIVE
HCT VFR BLD AUTO: 40.6 % (ref 37–48.5)
HGB BLD-MCNC: 13 G/DL (ref 12–16)
HGB UR QL STRIP: NEGATIVE
HYALINE CASTS #/AREA URNS LPF: 2 /LPF
IMM GRANULOCYTES # BLD AUTO: 0.04 K/UL (ref 0–0.04)
IMM GRANULOCYTES NFR BLD AUTO: 0.6 % (ref 0–0.5)
KETONES UR QL STRIP: NEGATIVE
LEUKOCYTE ESTERASE UR QL STRIP: ABNORMAL
LYMPHOCYTES # BLD AUTO: 1.6 K/UL (ref 1–4.8)
LYMPHOCYTES NFR BLD: 23.4 % (ref 18–48)
MCH RBC QN AUTO: 31 PG (ref 27–31)
MCHC RBC AUTO-ENTMCNC: 32 G/DL (ref 32–36)
MCV RBC AUTO: 97 FL (ref 82–98)
MICROSCOPIC COMMENT: ABNORMAL
MONOCYTES # BLD AUTO: 0.7 K/UL (ref 0.3–1)
MONOCYTES NFR BLD: 9.6 % (ref 4–15)
NEUTROPHILS # BLD AUTO: 4.5 K/UL (ref 1.8–7.7)
NEUTROPHILS NFR BLD: 63.9 % (ref 38–73)
NITRITE UR QL STRIP: POSITIVE
NRBC BLD-RTO: 0 /100 WBC
PH UR STRIP: 6 [PH] (ref 5–8)
PLATELET # BLD AUTO: 176 K/UL (ref 150–450)
PMV BLD AUTO: 10.2 FL (ref 9.2–12.9)
POTASSIUM SERPL-SCNC: 4 MMOL/L (ref 3.5–5.1)
PROT SERPL-MCNC: 7.7 G/DL (ref 6–8.4)
PROT UR QL STRIP: ABNORMAL
RBC # BLD AUTO: 4.2 M/UL (ref 4–5.4)
RBC #/AREA URNS HPF: 9 /HPF (ref 0–4)
SODIUM SERPL-SCNC: 143 MMOL/L (ref 136–145)
SP GR UR STRIP: 1.02 (ref 1–1.03)
URN SPEC COLLECT METH UR: ABNORMAL
UROBILINOGEN UR STRIP-ACNC: NEGATIVE EU/DL
WBC # BLD AUTO: 6.97 K/UL (ref 3.9–12.7)
WBC #/AREA URNS HPF: >100 /HPF (ref 0–5)
WBC CLUMPS URNS QL MICRO: ABNORMAL
YEAST URNS QL MICRO: ABNORMAL

## 2023-06-24 PROCEDURE — 25000003 PHARM REV CODE 250: Performed by: PHYSICIAN ASSISTANT

## 2023-06-24 PROCEDURE — 96365 THER/PROPH/DIAG IV INF INIT: CPT | Mod: 59

## 2023-06-24 PROCEDURE — 85025 COMPLETE CBC W/AUTO DIFF WBC: CPT | Performed by: PHYSICIAN ASSISTANT

## 2023-06-24 PROCEDURE — 80053 COMPREHEN METABOLIC PANEL: CPT | Performed by: PHYSICIAN ASSISTANT

## 2023-06-24 PROCEDURE — 87088 URINE BACTERIA CULTURE: CPT | Performed by: PHYSICIAN ASSISTANT

## 2023-06-24 PROCEDURE — 99284 EMERGENCY DEPT VISIT MOD MDM: CPT | Mod: 25

## 2023-06-24 PROCEDURE — 51702 INSERT TEMP BLADDER CATH: CPT

## 2023-06-24 PROCEDURE — 63600175 PHARM REV CODE 636 W HCPCS: Performed by: PHYSICIAN ASSISTANT

## 2023-06-24 PROCEDURE — 87077 CULTURE AEROBIC IDENTIFY: CPT | Performed by: PHYSICIAN ASSISTANT

## 2023-06-24 PROCEDURE — 81000 URINALYSIS NONAUTO W/SCOPE: CPT | Performed by: PHYSICIAN ASSISTANT

## 2023-06-24 PROCEDURE — 87186 SC STD MICRODIL/AGAR DIL: CPT | Performed by: PHYSICIAN ASSISTANT

## 2023-06-24 PROCEDURE — 87086 URINE CULTURE/COLONY COUNT: CPT | Performed by: PHYSICIAN ASSISTANT

## 2023-06-24 RX ORDER — CEPHALEXIN 500 MG/1
500 CAPSULE ORAL EVERY 12 HOURS
Qty: 14 CAPSULE | Refills: 0 | Status: SHIPPED | OUTPATIENT
Start: 2023-06-24 | End: 2023-07-01

## 2023-06-24 RX ORDER — FLUCONAZOLE 150 MG/1
150 TABLET ORAL
Status: COMPLETED | OUTPATIENT
Start: 2023-06-24 | End: 2023-06-24

## 2023-06-24 RX ADMIN — CEFTRIAXONE 1 G: 1 INJECTION, POWDER, FOR SOLUTION INTRAMUSCULAR; INTRAVENOUS at 11:06

## 2023-06-24 RX ADMIN — FLUCONAZOLE 150 MG: 150 TABLET ORAL at 11:06

## 2023-06-24 NOTE — ED PROVIDER NOTES
Encounter Date: 6/24/2023       History     Chief Complaint   Patient presents with    urinary catheter issue     Woke this am to empty leg bag when she noticed that the catheter was out. Denies any trauma or bleeding. Had cathter changed last month and has appt to go this Friday.     72-year-old female presents to ED with concern of urinary catheter accidentally getting removed overnight.  No pain symptoms.  She reports she is unable to urinate on her own due to history of chronic and recurrent UTIs and kidney stones.  She is had used a urinary catheter for over 1 year.  Denying any abdominal pain along with no fevers, chills, nausea, vomiting,  pain.  No other acute complaints at this time.    The history is provided by the patient.   Review of patient's allergies indicates:   Allergen Reactions    Macrobid [nitrofurantoin monohyd/m-cryst] Swelling    Sulfa (sulfonamide antibiotics) Anaphylaxis    Asa [aspirin] Other (See Comments)    Codeine Nausea Only     Past Medical History:   Diagnosis Date    Allergy     CKD (chronic kidney disease) stage 4, GFR 15-29 ml/min     COPD (chronic obstructive pulmonary disease)     DVT (deep venous thrombosis) left leg post cholecystectomy    Hypercalcemia     Hypercalcemia     Hypertension     Hypokalemia     Hypothyroidism     Kidney stone     Thyroid disease     Urinary tract infection     Vaginal infection     Vitamin D deficiency      Past Surgical History:   Procedure Laterality Date    APPENDECTOMY      BREAST SURGERY      biopsy only x 6    CARPAL TUNNEL RELEASE Bilateral     CHOLECYSTECTOMY      CYSTOSCOPY      CYSTOSCOPY WITH CYSTOGRAPHY N/A 4/4/2022    Procedure: CYSTOSCOPY, WITH CYSTOGRAM;  Surgeon: Ashu Riley MD;  Location: [facility] OR;  Service: Urology;  Laterality: N/A;    CYSTOSCOPY WITH URODYNAMIC TESTING N/A 4/18/2022    Procedure: CYSTOSCOPY, WITH URODYNAMIC TESTING FLOUROSCOPIC;  Surgeon: Trae Em MD;  Location: [facility] OR [address] [place] [address];  Service:  Urology;  Laterality: N/A;  1hr    CHARI FILTER PLACEMENT      INSERTION OF URETERAL CATHETER N/A 2022    Procedure: INSERTION, CATHETER, URETER;  Surgeon: Ashu Riley MD;  Location: [facility] OR;  Service: Urology;  Laterality: N/A;    INSERTION OF URETERAL CATHETER  2022    Procedure: ;  Surgeon: Ashu Riley MD;  Location: [facility] OR;  Service: Urology;;    RETROGRADE PYELOGRAPHY Bilateral 2022    Procedure: PYELOGRAM, RETROGRADE;  Surgeon: Ashu Riley MD;  Location: [facility] OR;  Service: Urology;  Laterality: Bilateral;    URETEROSCOPY Left 2022    Procedure: URETEROSCOPY;  Surgeon: Ashu Riley MD;  Location: [facility] OR;  Service: Urology;  Laterality: Left;     Family History   Problem Relation Age of Onset    No Known Problems Mother     No Known Problems Father     Kidney disease Neg Hx      Social History     Tobacco Use    Smoking status: Former     Types: Cigarettes     Quit date: 1996     Years since quittin.4    Smokeless tobacco: Never   Substance Use Topics    Alcohol use: No     Alcohol/week: 0.0 standard drinks    Drug use: No     Review of Systems   Constitutional:  Negative for chills and fever.   Gastrointestinal:  Negative for abdominal pain, nausea and vomiting.   Genitourinary:  Negative for flank pain, vaginal bleeding, vaginal discharge and vaginal pain.   Musculoskeletal:  Negative for back pain.   Skin:  Negative for wound.     Physical Exam     Initial Vitals [23 0905]   BP Pulse Resp Temp SpO2   (!) 142/67 65 19 97.7 °F (36.5 °C) 99 %      MAP       --         Physical Exam    Nursing note and vitals reviewed.  Constitutional: Vital signs are normal. She appears well-developed and well-nourished. She is cooperative. She does not have a sickly appearance. She does not appear ill. No distress.   HENT:   Head: Normocephalic and atraumatic.   Eyes: EOM are normal.   Neck:   Normal range of motion.  Abdominal:   No abdominal or suprapubic  tenderness.   Musculoskeletal:      Cervical back: Normal range of motion.     Neurological: She is alert and oriented to person, place, and time. GCS eye subscore is 4. GCS verbal subscore is 5. GCS motor subscore is 6.   Psychiatric: She has a normal mood and affect. Her speech is normal and behavior is normal.       ED Course   Procedures  Labs Reviewed   CBC W/ AUTO DIFFERENTIAL - Abnormal; Notable for the following components:       Result Value    Immature Granulocytes 0.6 (*)     All other components within normal limits   COMPREHENSIVE METABOLIC PANEL - Abnormal; Notable for the following components:    BUN 39 (*)     Creatinine 1.8 (*)     eGFR 30 (*)     All other components within normal limits   URINALYSIS, REFLEX TO URINE CULTURE - Abnormal; Notable for the following components:    Appearance, UA Hazy (*)     Protein, UA Trace (*)     Nitrite, UA Positive (*)     Leukocytes, UA 3+ (*)     All other components within normal limits    Narrative:     Specimen Source->Urine   URINALYSIS MICROSCOPIC - Abnormal; Notable for the following components:    RBC, UA 9 (*)     WBC, UA >100 (*)     WBC Clumps, UA Occasional (*)     Bacteria Many (*)     Yeast, UA Rare (*)     Hyaline Casts, UA 2 (*)     All other components within normal limits    Narrative:     Specimen Source->Urine   CULTURE, URINE          Imaging Results    None          Medications   fluconazole tablet 150 mg (150 mg Oral Given 6/24/23 1111)   cefTRIAXone (ROCEPHIN) 1 g in dextrose 5 % in water (D5W) 5 % 100 mL IVPB (MB+) (0 g Intravenous Stopped 6/24/23 1142)     Medical Decision Making:   Initial Assessment:   Patient presents with concern of urinary catheter accidentally getting removed overnight.  Denying any current pain symptoms.  Afebrile.  Patient otherwise well-appearing on exam.  No abdominal pain or tenderness.  Differential Diagnosis:   Catheter malfunction, UTI, MINDI  ED Management:  CBC, CMP, UA    CBC grossly unremarkable no  elevation in WBC.  CMP noting creatinine 1.8, appearing grossly near baseline.  UA significant for urinary infection noting nitrite positive, 3+ leukocytes, 100 WBC, yeast.  Given p.o. fluconazole in ED along with IV Rocephin.  Prescription for Keflex.  Urine catheter was replaced by nurse at bedside with no complications.  Patient tolerated well.  Encouraged close PCP/urology follow-up with high ED return precautions.  Patient states her understanding and agrees with plan.                        Clinical Impression:   Final diagnoses:  [T83.9XXA] Rodriguez catheter problem, initial encounter  [N30.01] Acute cystitis with hematuria (Primary)        ED Disposition Condition    Discharge Stable          ED Prescriptions       Medication Sig Dispense Start Date End Date Auth. Provider    cephALEXin (KEFLEX) 500 MG capsule Take 1 capsule (500 mg total) by mouth every 12 (twelve) hours. for 7 days 14 capsule 6/24/2023 7/1/2023 Vega Durand PA-C          Follow-up Information       Follow up With Specialties Details Why Contact Info    Pola Knox MD Internal Medicine   [address]  [place]  [place] [address]  [phone]      Ashu Riley MD Urology   [address]  [address]  [place] [address]  [phone]               Vega Durand PA-C  06/24/23 1207

## 2023-06-24 NOTE — DISCHARGE INSTRUCTIONS

## 2023-06-24 NOTE — ED NOTES
Pt arrived from home complains of cath came out this morning.  Pt report that cath just came out. Pt appears to be comfortable and in no distress.

## 2023-06-26 LAB — BACTERIA UR CULT: ABNORMAL

## 2023-06-30 ENCOUNTER — CLINICAL SUPPORT (OUTPATIENT)
Dept: UROLOGY | Facility: CLINIC | Age: 73
End: 2023-06-30
Payer: MEDICARE

## 2023-06-30 PROCEDURE — 99999 PR PBB SHADOW E&M-EST. PATIENT-LVL II: ICD-10-PCS | Mod: PBBFAC,,,

## 2023-06-30 PROCEDURE — 99999 PR PBB SHADOW E&M-EST. PATIENT-LVL II: CPT | Mod: PBBFAC,,,

## 2023-06-30 NOTE — PROGRESS NOTES
Verbal order received to exchange chino, instill 15 cc of sterile water into balloon.  Patient stated she is having issues with it staying connected.    Chino exchange.  Under sterile technique, 16 Fr chino inserted into urethra without difficulty.  Clear, yellow urine output.  Balloon inflated with 15 cc sterile water.  Connected to leg bag/strap to right thigh.  Patient tolerated well.  Next chino exchange scheduled with patient.

## 2023-07-10 PROBLEM — N17.9 ACUTE RENAL FAILURE: Status: RESOLVED | Noted: 2023-04-05 | Resolved: 2023-07-10

## 2023-07-27 ENCOUNTER — CLINICAL SUPPORT (OUTPATIENT)
Dept: UROLOGY | Facility: CLINIC | Age: 73
End: 2023-07-27
Payer: MEDICARE

## 2023-07-27 VITALS
HEART RATE: 60 BPM | RESPIRATION RATE: 14 BRPM | BODY MASS INDEX: 34.77 KG/M2 | HEIGHT: 61 IN | SYSTOLIC BLOOD PRESSURE: 122 MMHG | DIASTOLIC BLOOD PRESSURE: 73 MMHG

## 2023-07-27 DIAGNOSIS — R33.9 URINARY RETENTION: Primary | ICD-10-CM

## 2023-07-27 PROCEDURE — 99999 PR PBB SHADOW E&M-EST. PATIENT-LVL III: CPT | Mod: PBBFAC,,,

## 2023-07-27 PROCEDURE — 99999 PR PBB SHADOW E&M-EST. PATIENT-LVL III: ICD-10-PCS | Mod: PBBFAC,,,

## 2023-07-27 RX ORDER — FUROSEMIDE 40 MG/1
40 TABLET ORAL 2 TIMES DAILY
COMMUNITY
Start: 2023-07-15 | End: 2023-08-15 | Stop reason: SDUPTHER

## 2023-07-27 NOTE — PROGRESS NOTES
Deflated balloon 15:cc. Removed 16Fr catheter. Catheter tip grossly intact. Scant amount of yellow/white discharge noted slightly red and irritated.Pt denies pain, pt reports slight itching as of early this week. Dr. Riley notified, stepped in to take a look and advised pt to use over the counter Monistat cream and to notify him if it worsens or does not resolve. Cleansed pt with betadine swab x 3 using aseptic technique. Using sterile technique inserted 16 Fr chino with lubrication for comfort,  visualised light yellow urine return. Inflated balloon with 10 cc sterile water. Connected to leg bag and secured to right leg with leg strap. Pt tolerated well. Pt will return in 1 month for  nurse visit chino exchange. Appointment scheduled and given to patient.

## 2023-08-07 ENCOUNTER — HOSPITAL ENCOUNTER (OUTPATIENT)
Dept: RADIOLOGY | Facility: HOSPITAL | Age: 73
Discharge: HOME | End: 2023-08-07
Attending: INTERNAL MEDICINE
Payer: MEDICARE

## 2023-08-07 DIAGNOSIS — N20.0 RENAL STONE: ICD-10-CM

## 2023-08-07 PROCEDURE — 76770 US RETROPERITONEAL COMPLETE: ICD-10-PCS | Mod: 26,,, | Performed by: RADIOLOGY

## 2023-08-07 PROCEDURE — 76770 US EXAM ABDO BACK WALL COMP: CPT | Mod: TC

## 2023-08-07 PROCEDURE — 76770 US EXAM ABDO BACK WALL COMP: CPT | Mod: 26,,, | Performed by: RADIOLOGY

## 2023-08-28 ENCOUNTER — TELEPHONE (OUTPATIENT)
Dept: UROLOGY | Facility: CLINIC | Age: 73
End: 2023-08-28
Payer: MEDICARE

## 2023-08-28 NOTE — TELEPHONE ENCOUNTER
----- Message from Comfort Robles LPN sent at 8/28/2023  1:01 PM CDT -----  Regarding: appointment change  Please see if patient can come Wednesday at 9:30. There are no providers tomorrow.

## 2023-08-28 NOTE — TELEPHONE ENCOUNTER
Spoke with pt and advised to her that upcoming appt has been rescheduled. New appt time and date given. Pt voiced her understanding

## 2023-08-30 ENCOUNTER — LAB VISIT (OUTPATIENT)
Dept: LAB | Facility: HOSPITAL | Age: 73
End: 2023-08-30
Payer: MEDICARE

## 2023-08-30 ENCOUNTER — CLINICAL SUPPORT (OUTPATIENT)
Dept: UROLOGY | Facility: CLINIC | Age: 73
End: 2023-08-30
Payer: MEDICARE

## 2023-08-30 VITALS — HEART RATE: 76 BPM | SYSTOLIC BLOOD PRESSURE: 131 MMHG | DIASTOLIC BLOOD PRESSURE: 66 MMHG

## 2023-08-30 DIAGNOSIS — N18.31 STAGE 3A CHRONIC KIDNEY DISEASE: ICD-10-CM

## 2023-08-30 DIAGNOSIS — E55.9 VITAMIN D DEFICIENCY: ICD-10-CM

## 2023-08-30 DIAGNOSIS — N17.9 AKI (ACUTE KIDNEY INJURY): ICD-10-CM

## 2023-08-30 DIAGNOSIS — N20.0 KIDNEY STONE: ICD-10-CM

## 2023-08-30 DIAGNOSIS — N25.81 SECONDARY HYPERPARATHYROIDISM OF RENAL ORIGIN: ICD-10-CM

## 2023-08-30 DIAGNOSIS — D63.1 ANEMIA IN STAGE 3A CHRONIC KIDNEY DISEASE: ICD-10-CM

## 2023-08-30 DIAGNOSIS — R33.9 CHRONIC RETENTION OF URINE: Primary | ICD-10-CM

## 2023-08-30 DIAGNOSIS — N18.31 ANEMIA IN STAGE 3A CHRONIC KIDNEY DISEASE: ICD-10-CM

## 2023-08-30 LAB
25(OH)D3+25(OH)D2 SERPL-MCNC: 57 NG/ML (ref 30–96)
ALBUMIN SERPL BCP-MCNC: 3.9 G/DL (ref 3.5–5.2)
ALBUMIN SERPL BCP-MCNC: 3.9 G/DL (ref 3.5–5.2)
ALBUMIN/CREAT UR: 24.4 UG/MG (ref 0–30)
ANION GAP SERPL CALC-SCNC: 14 MMOL/L (ref 8–16)
ANION GAP SERPL CALC-SCNC: 14 MMOL/L (ref 8–16)
BACTERIA #/AREA URNS HPF: ABNORMAL /HPF
BASOPHILS # BLD AUTO: 0.05 K/UL (ref 0–0.2)
BASOPHILS # BLD AUTO: 0.05 K/UL (ref 0–0.2)
BASOPHILS NFR BLD: 0.7 % (ref 0–1.9)
BASOPHILS NFR BLD: 0.7 % (ref 0–1.9)
BILIRUB UR QL STRIP: NEGATIVE
BILIRUB UR QL STRIP: NEGATIVE
BUN SERPL-MCNC: 30 MG/DL (ref 8–23)
BUN SERPL-MCNC: 30 MG/DL (ref 8–23)
C3 SERPL-MCNC: 148 MG/DL (ref 50–180)
C4 SERPL-MCNC: 34 MG/DL (ref 11–44)
CALCIUM SERPL-MCNC: 10.4 MG/DL (ref 8.7–10.5)
CALCIUM SERPL-MCNC: 10.4 MG/DL (ref 8.7–10.5)
CHLORIDE SERPL-SCNC: 100 MMOL/L (ref 95–110)
CHLORIDE SERPL-SCNC: 100 MMOL/L (ref 95–110)
CLARITY UR: ABNORMAL
CLARITY UR: ABNORMAL
CO2 SERPL-SCNC: 28 MMOL/L (ref 23–29)
CO2 SERPL-SCNC: 28 MMOL/L (ref 23–29)
COLOR UR: YELLOW
COLOR UR: YELLOW
CREAT SERPL-MCNC: 1.5 MG/DL (ref 0.5–1.4)
CREAT SERPL-MCNC: 1.5 MG/DL (ref 0.5–1.4)
CREAT UR-MCNC: 106.4 MG/DL (ref 15–325)
DIFFERENTIAL METHOD: NORMAL
DIFFERENTIAL METHOD: NORMAL
EOSINOPHIL # BLD AUTO: 0.1 K/UL (ref 0–0.5)
EOSINOPHIL # BLD AUTO: 0.1 K/UL (ref 0–0.5)
EOSINOPHIL NFR BLD: 1.2 % (ref 0–8)
EOSINOPHIL NFR BLD: 1.2 % (ref 0–8)
ERYTHROCYTE [DISTWIDTH] IN BLOOD BY AUTOMATED COUNT: 13.3 % (ref 11.5–14.5)
ERYTHROCYTE [DISTWIDTH] IN BLOOD BY AUTOMATED COUNT: 13.3 % (ref 11.5–14.5)
EST. GFR  (NO RACE VARIABLE): 37 ML/MIN/1.73 M^2
EST. GFR  (NO RACE VARIABLE): 37 ML/MIN/1.73 M^2
FERRITIN SERPL-MCNC: 203 NG/ML (ref 20–300)
GLUCOSE SERPL-MCNC: 102 MG/DL (ref 70–110)
GLUCOSE SERPL-MCNC: 102 MG/DL (ref 70–110)
GLUCOSE UR QL STRIP: NEGATIVE
GLUCOSE UR QL STRIP: NEGATIVE
HBV SURFACE AB SER-ACNC: <3 MIU/ML
HBV SURFACE AB SER-ACNC: NORMAL M[IU]/ML
HBV SURFACE AG SERPL QL IA: NORMAL
HCT VFR BLD AUTO: 42.3 % (ref 37–48.5)
HCT VFR BLD AUTO: 42.3 % (ref 37–48.5)
HCV AB SERPL QL IA: NORMAL
HGB BLD-MCNC: 13.7 G/DL (ref 12–16)
HGB BLD-MCNC: 13.7 G/DL (ref 12–16)
HGB UR QL STRIP: NEGATIVE
HGB UR QL STRIP: NEGATIVE
HYALINE CASTS #/AREA URNS LPF: 18 /LPF
IMM GRANULOCYTES # BLD AUTO: 0.03 K/UL (ref 0–0.04)
IMM GRANULOCYTES # BLD AUTO: 0.03 K/UL (ref 0–0.04)
IMM GRANULOCYTES NFR BLD AUTO: 0.4 % (ref 0–0.5)
IMM GRANULOCYTES NFR BLD AUTO: 0.4 % (ref 0–0.5)
IRON SERPL-MCNC: 145 UG/DL (ref 30–160)
KETONES UR QL STRIP: NEGATIVE
KETONES UR QL STRIP: NEGATIVE
LEUKOCYTE ESTERASE UR QL STRIP: ABNORMAL
LEUKOCYTE ESTERASE UR QL STRIP: ABNORMAL
LYMPHOCYTES # BLD AUTO: 1.5 K/UL (ref 1–4.8)
LYMPHOCYTES # BLD AUTO: 1.5 K/UL (ref 1–4.8)
LYMPHOCYTES NFR BLD: 20 % (ref 18–48)
LYMPHOCYTES NFR BLD: 20 % (ref 18–48)
MAGNESIUM SERPL-MCNC: 2 MG/DL (ref 1.6–2.6)
MAGNESIUM SERPL-MCNC: 2 MG/DL (ref 1.6–2.6)
MCH RBC QN AUTO: 30.6 PG (ref 27–31)
MCH RBC QN AUTO: 30.6 PG (ref 27–31)
MCHC RBC AUTO-ENTMCNC: 32.4 G/DL (ref 32–36)
MCHC RBC AUTO-ENTMCNC: 32.4 G/DL (ref 32–36)
MCV RBC AUTO: 94 FL (ref 82–98)
MCV RBC AUTO: 94 FL (ref 82–98)
MICROALBUMIN UR DL<=1MG/L-MCNC: 26 UG/ML
MICROSCOPIC COMMENT: ABNORMAL
MONOCYTES # BLD AUTO: 0.7 K/UL (ref 0.3–1)
MONOCYTES # BLD AUTO: 0.7 K/UL (ref 0.3–1)
MONOCYTES NFR BLD: 9.3 % (ref 4–15)
MONOCYTES NFR BLD: 9.3 % (ref 4–15)
NEUTROPHILS # BLD AUTO: 5 K/UL (ref 1.8–7.7)
NEUTROPHILS # BLD AUTO: 5 K/UL (ref 1.8–7.7)
NEUTROPHILS NFR BLD: 68.4 % (ref 38–73)
NEUTROPHILS NFR BLD: 68.4 % (ref 38–73)
NITRITE UR QL STRIP: POSITIVE
NITRITE UR QL STRIP: POSITIVE
NRBC BLD-RTO: 0 /100 WBC
NRBC BLD-RTO: 0 /100 WBC
PH UR STRIP: 5 [PH] (ref 5–8)
PH UR STRIP: 5 [PH] (ref 5–8)
PHOSPHATE SERPL-MCNC: 2.2 MG/DL (ref 2.7–4.5)
PHOSPHATE SERPL-MCNC: 2.2 MG/DL (ref 2.7–4.5)
PLATELET # BLD AUTO: 198 K/UL (ref 150–450)
PLATELET # BLD AUTO: 198 K/UL (ref 150–450)
PMV BLD AUTO: 10.6 FL (ref 9.2–12.9)
PMV BLD AUTO: 10.6 FL (ref 9.2–12.9)
POTASSIUM SERPL-SCNC: 3.9 MMOL/L (ref 3.5–5.1)
POTASSIUM SERPL-SCNC: 3.9 MMOL/L (ref 3.5–5.1)
PROT UR QL STRIP: NEGATIVE
PROT UR QL STRIP: NEGATIVE
PROT UR-MCNC: 17 MG/DL (ref 0–15)
PROT UR-MCNC: 17 MG/DL (ref 0–15)
PROT/CREAT UR: 0.16 MG/G{CREAT} (ref 0–0.2)
PROT/CREAT UR: 0.16 MG/G{CREAT} (ref 0–0.2)
PTH-INTACT SERPL-MCNC: 103.8 PG/ML (ref 9–77)
PTH-INTACT SERPL-MCNC: 103.8 PG/ML (ref 9–77)
RBC # BLD AUTO: 4.48 M/UL (ref 4–5.4)
RBC # BLD AUTO: 4.48 M/UL (ref 4–5.4)
RBC #/AREA URNS HPF: 13 /HPF (ref 0–4)
SATURATED IRON: 44 % (ref 20–50)
SODIUM SERPL-SCNC: 142 MMOL/L (ref 136–145)
SODIUM SERPL-SCNC: 142 MMOL/L (ref 136–145)
SP GR UR STRIP: 1.01 (ref 1–1.03)
SP GR UR STRIP: 1.01 (ref 1–1.03)
SQUAMOUS #/AREA URNS HPF: 3 /HPF
TOTAL IRON BINDING CAPACITY: 332 UG/DL (ref 250–450)
TRANSFERRIN SERPL-MCNC: 224 MG/DL (ref 200–375)
URATE SERPL-MCNC: 9.7 MG/DL (ref 2.4–5.7)
URATE SERPL-MCNC: 9.7 MG/DL (ref 2.4–5.7)
URN SPEC COLLECT METH UR: ABNORMAL
URN SPEC COLLECT METH UR: ABNORMAL
UROBILINOGEN UR STRIP-ACNC: NEGATIVE EU/DL
UROBILINOGEN UR STRIP-ACNC: NEGATIVE EU/DL
WBC # BLD AUTO: 7.35 K/UL (ref 3.9–12.7)
WBC # BLD AUTO: 7.35 K/UL (ref 3.9–12.7)
WBC #/AREA URNS HPF: >100 /HPF (ref 0–5)

## 2023-08-30 PROCEDURE — 87077 CULTURE AEROBIC IDENTIFY: CPT | Performed by: UROLOGY

## 2023-08-30 PROCEDURE — 84165 PROTEIN E-PHORESIS SERUM: CPT | Performed by: INTERNAL MEDICINE

## 2023-08-30 PROCEDURE — 86235 NUCLEAR ANTIGEN ANTIBODY: CPT | Mod: 59 | Performed by: INTERNAL MEDICINE

## 2023-08-30 PROCEDURE — 83735 ASSAY OF MAGNESIUM: CPT | Mod: 91 | Performed by: INTERNAL MEDICINE

## 2023-08-30 PROCEDURE — 87186 SC STD MICRODIL/AGAR DIL: CPT | Performed by: UROLOGY

## 2023-08-30 PROCEDURE — 81000 URINALYSIS NONAUTO W/SCOPE: CPT | Performed by: INTERNAL MEDICINE

## 2023-08-30 PROCEDURE — 86803 HEPATITIS C AB TEST: CPT | Performed by: INTERNAL MEDICINE

## 2023-08-30 PROCEDURE — 99999 PR PBB SHADOW E&M-EST. PATIENT-LVL I: ICD-10-PCS | Mod: PBBFAC,,,

## 2023-08-30 PROCEDURE — 86706 HEP B SURFACE ANTIBODY: CPT | Performed by: INTERNAL MEDICINE

## 2023-08-30 PROCEDURE — 84550 ASSAY OF BLOOD/URIC ACID: CPT | Performed by: INTERNAL MEDICINE

## 2023-08-30 PROCEDURE — 87340 HEPATITIS B SURFACE AG IA: CPT | Performed by: INTERNAL MEDICINE

## 2023-08-30 PROCEDURE — 86160 COMPLEMENT ANTIGEN: CPT | Performed by: INTERNAL MEDICINE

## 2023-08-30 PROCEDURE — 83521 IG LIGHT CHAINS FREE EACH: CPT | Mod: 59 | Performed by: INTERNAL MEDICINE

## 2023-08-30 PROCEDURE — 82043 UR ALBUMIN QUANTITATIVE: CPT | Performed by: INTERNAL MEDICINE

## 2023-08-30 PROCEDURE — 86160 COMPLEMENT ANTIGEN: CPT | Mod: 59 | Performed by: INTERNAL MEDICINE

## 2023-08-30 PROCEDURE — 36415 COLL VENOUS BLD VENIPUNCTURE: CPT | Performed by: INTERNAL MEDICINE

## 2023-08-30 PROCEDURE — 84165 PROTEIN E-PHORESIS SERUM: CPT | Mod: 26,,, | Performed by: PATHOLOGY

## 2023-08-30 PROCEDURE — 84466 ASSAY OF TRANSFERRIN: CPT | Performed by: INTERNAL MEDICINE

## 2023-08-30 PROCEDURE — 86334 IMMUNOFIX E-PHORESIS SERUM: CPT | Mod: 26,,, | Performed by: PATHOLOGY

## 2023-08-30 PROCEDURE — 80069 RENAL FUNCTION PANEL: CPT | Performed by: INTERNAL MEDICINE

## 2023-08-30 PROCEDURE — 87088 URINE BACTERIA CULTURE: CPT | Performed by: UROLOGY

## 2023-08-30 PROCEDURE — 82306 VITAMIN D 25 HYDROXY: CPT | Performed by: INTERNAL MEDICINE

## 2023-08-30 PROCEDURE — 87086 URINE CULTURE/COLONY COUNT: CPT | Performed by: UROLOGY

## 2023-08-30 PROCEDURE — 86334 IMMUNOFIX E-PHORESIS SERUM: CPT | Performed by: INTERNAL MEDICINE

## 2023-08-30 PROCEDURE — 84156 ASSAY OF PROTEIN URINE: CPT | Performed by: INTERNAL MEDICINE

## 2023-08-30 PROCEDURE — 85025 COMPLETE CBC W/AUTO DIFF WBC: CPT | Performed by: INTERNAL MEDICINE

## 2023-08-30 PROCEDURE — 86334 PATHOLOGIST INTERPRETATION IFE: ICD-10-PCS | Mod: 26,,, | Performed by: PATHOLOGY

## 2023-08-30 PROCEDURE — 86038 ANTINUCLEAR ANTIBODIES: CPT | Performed by: INTERNAL MEDICINE

## 2023-08-30 PROCEDURE — 99999 PR PBB SHADOW E&M-EST. PATIENT-LVL I: CPT | Mod: PBBFAC,,,

## 2023-08-30 PROCEDURE — 84392 ASSAY OF URINE SULFATE: CPT | Performed by: INTERNAL MEDICINE

## 2023-08-30 PROCEDURE — 86039 ANTINUCLEAR ANTIBODIES (ANA): CPT | Performed by: INTERNAL MEDICINE

## 2023-08-30 PROCEDURE — 83735 ASSAY OF MAGNESIUM: CPT | Performed by: INTERNAL MEDICINE

## 2023-08-30 PROCEDURE — 82728 ASSAY OF FERRITIN: CPT | Performed by: INTERNAL MEDICINE

## 2023-08-30 PROCEDURE — 83970 ASSAY OF PARATHORMONE: CPT | Performed by: INTERNAL MEDICINE

## 2023-08-30 PROCEDURE — 86225 DNA ANTIBODY NATIVE: CPT | Performed by: INTERNAL MEDICINE

## 2023-08-30 PROCEDURE — 84165 PATHOLOGIST INTERPRETATION SPE: ICD-10-PCS | Mod: 26,,, | Performed by: PATHOLOGY

## 2023-08-30 NOTE — PROGRESS NOTES
Patient's 16F chino changed without adverse event, clear yellow urine now draining into the bag.  Patient reported burning and pain (before the cath change) that she she states is c/w UTI for her. I collected a urine culture from the new catheter and sent a message to Dr. Riley regarding these symptoms. Gave her copy of her next appointment.

## 2023-08-31 LAB
ALBUMIN SERPL ELPH-MCNC: 3.98 G/DL (ref 3.35–5.55)
ALPHA1 GLOB SERPL ELPH-MCNC: 0.28 G/DL (ref 0.17–0.41)
ALPHA2 GLOB SERPL ELPH-MCNC: 0.95 G/DL (ref 0.43–0.99)
ANA PATTERN 1: NORMAL
ANA SER QL IF: POSITIVE
ANA TITR SER IF: NORMAL {TITER}
B-GLOBULIN SERPL ELPH-MCNC: 0.95 G/DL (ref 0.5–1.1)
GAMMA GLOB SERPL ELPH-MCNC: 1.35 G/DL (ref 0.67–1.58)
INTERPRETATION SERPL IFE-IMP: NORMAL
KAPPA LC SER QL IA: 6.6 MG/DL (ref 0.33–1.94)
KAPPA LC/LAMBDA SER IA: 1.76 (ref 0.26–1.65)
LAMBDA LC SER QL IA: 3.74 MG/DL (ref 0.57–2.63)
PATHOLOGIST INTERPRETATION IFE: NORMAL
PATHOLOGIST INTERPRETATION SPE: NORMAL
PROT SERPL-MCNC: 7.5 G/DL (ref 6–8.4)

## 2023-09-01 ENCOUNTER — TELEPHONE (OUTPATIENT)
Dept: UROLOGY | Facility: CLINIC | Age: 73
End: 2023-09-01
Payer: MEDICARE

## 2023-09-01 DIAGNOSIS — N39.0 RECURRENT UTI: ICD-10-CM

## 2023-09-01 DIAGNOSIS — R33.9 CHRONIC RETENTION OF URINE: Primary | ICD-10-CM

## 2023-09-01 LAB — BACTERIA UR CULT: ABNORMAL

## 2023-09-01 RX ORDER — CEPHALEXIN 500 MG/1
500 CAPSULE ORAL 4 TIMES DAILY
Qty: 20 CAPSULE | Refills: 0 | Status: SHIPPED | OUTPATIENT
Start: 2023-09-01 | End: 2023-09-06

## 2023-09-01 NOTE — TELEPHONE ENCOUNTER
----- Message from Ashu Riley MD sent at 9/1/2023 10:26 AM CDT -----  Antibiotic sent to pharmacy    [place]  ----- Message -----  From: Shaina Dawn RN  Sent: 8/30/2023   9:37 AM CDT  To: Ashu Riley MD    Good morning,    Patient reports intense burning and pain (chronic Rodriguez) which she states is c/w UTI for her. I sent a culture from new catheter. She requests abx while waiting for culture to result. Please advise.     Thank you.    Shaina Dawn, RN  Nurse Navigator Urology, [facility]  P: [phone]  F: [phone]

## 2023-09-05 LAB
ANTI SM ANTIBODY: 0.58 RATIO (ref 0–0.99)
ANTI SM/RNP ANTIBODY: 0.12 RATIO (ref 0–0.99)
ANTI-SM INTERPRETATION: NEGATIVE
ANTI-SM/RNP INTERPRETATION: NEGATIVE
ANTI-SSA ANTIBODY: 0.07 RATIO (ref 0–0.99)
ANTI-SSA INTERPRETATION: NEGATIVE
ANTI-SSB ANTIBODY: 0.06 RATIO (ref 0–0.99)
ANTI-SSB INTERPRETATION: NEGATIVE
DSDNA AB SER-ACNC: NORMAL [IU]/ML

## 2023-09-07 LAB
AMMONIA 24H UR-SRATE: 28 MMOL/24 H (ref 15–56)
BSA: ABNORMAL 1.73M(2)
CA H2 PHOS DIHYD 24H SATFR UR: -3.61 DG
CALCIUM 24H UR-MRATE: 112 MG/24 H
CAOX 24H ENGDIFF UR: 1.94 DG
CHLORIDE 24H UR-SRATE: 85 MMOL/24 H (ref 34–286)
CITRATE 24H UR-MRATE: 472 MG/24 H
CLINICAL BIOCHEMIST REVIEW: ABNORMAL
COLLECT DURATION TIME UR: 24 H
CREAT 24H UR-MRATE: 1440 MG/24 H (ref 603–1783)
HYDROXYAPATITE 24H ENGDIFF UR: -1.75 DG
MAGNESIUM 24H UR-MRATE: 64 MG/24 H (ref 51–269)
OSMOLALITY 24H UR: 395 MOSM/KG (ref 150–1150)
OXALATE 24H UR-MRATE: 28.2 MG/24 H (ref 9.7–40.5)
OXALATE 24H UR-SRATE: 0.32 MMOL/24 H (ref 0.11–0.46)
PH 24H UR: 4.8 [PH] (ref 4.5–8)
PHOSPHATE 24H UR-MRATE: 624 MG/24 H (ref 226–1797)
POTASSIUM 24H UR-SRATE: 66 MMOL/24 H (ref 16–105)
PROTEIN CATABOLIC RATE, URINE: 70 G/24 H (ref 56–125)
SODIUM 24H UR-SRATE: 93 MMOL/24 H (ref 22–328)
SPECIMEN VOL 24H UR: 1600 ML
SULFATE 24H UR-SRATE: 7 MMOL/24 H (ref 7–47)
URATE 24H SATFR UR: 2.33 DG
URATE 24H UR-MRATE: 224 MG/24 H (ref 250–750)
UUN 24H UR-MRATE: 7.2 G/24 H (ref 7–42)

## 2023-09-25 ENCOUNTER — CLINICAL SUPPORT (OUTPATIENT)
Dept: UROLOGY | Facility: CLINIC | Age: 73
End: 2023-09-25
Payer: MEDICARE

## 2023-09-25 VITALS — BODY MASS INDEX: 33.07 KG/M2 | HEIGHT: 61 IN

## 2023-09-25 DIAGNOSIS — R33.9 CHRONIC RETENTION OF URINE: Primary | ICD-10-CM

## 2023-09-25 PROCEDURE — 99999 PR PBB SHADOW E&M-EST. PATIENT-LVL III: CPT | Mod: PBBFAC,,,

## 2023-09-25 PROCEDURE — 99999 PR PBB SHADOW E&M-EST. PATIENT-LVL III: ICD-10-PCS | Mod: PBBFAC,,,

## 2023-09-25 RX ORDER — LISINOPRIL 40 MG/1
40 TABLET ORAL DAILY
COMMUNITY
Start: 2023-09-20

## 2023-09-25 NOTE — PROGRESS NOTES
Patient's 16F Rodriguez was exchanged using sterile technique. Clear yellow urine was immediately drained upon insertion, balloon inflated to 10 cc, leg bag attached. Patient was given her AVS.

## 2023-10-24 ENCOUNTER — CLINICAL SUPPORT (OUTPATIENT)
Dept: UROLOGY | Facility: CLINIC | Age: 73
End: 2023-10-24
Payer: MEDICARE

## 2023-10-24 ENCOUNTER — LAB VISIT (OUTPATIENT)
Dept: LAB | Facility: HOSPITAL | Age: 73
End: 2023-10-24
Payer: MEDICARE

## 2023-10-24 VITALS
SYSTOLIC BLOOD PRESSURE: 134 MMHG | BODY MASS INDEX: 33.05 KG/M2 | HEART RATE: 57 BPM | HEIGHT: 61 IN | DIASTOLIC BLOOD PRESSURE: 80 MMHG | WEIGHT: 175.06 LBS

## 2023-10-24 DIAGNOSIS — N25.81 SECONDARY HYPERPARATHYROIDISM OF RENAL ORIGIN: ICD-10-CM

## 2023-10-24 DIAGNOSIS — N18.31 ANEMIA IN STAGE 3A CHRONIC KIDNEY DISEASE: ICD-10-CM

## 2023-10-24 DIAGNOSIS — N18.31 STAGE 3A CHRONIC KIDNEY DISEASE: ICD-10-CM

## 2023-10-24 DIAGNOSIS — N17.9 AKI (ACUTE KIDNEY INJURY): ICD-10-CM

## 2023-10-24 DIAGNOSIS — D63.1 ANEMIA IN STAGE 3A CHRONIC KIDNEY DISEASE: ICD-10-CM

## 2023-10-24 DIAGNOSIS — R33.9 URINARY RETENTION: Primary | ICD-10-CM

## 2023-10-24 LAB
ALBUMIN SERPL BCP-MCNC: 3.7 G/DL (ref 3.5–5.2)
ALBUMIN SERPL BCP-MCNC: 3.7 G/DL (ref 3.5–5.2)
ALBUMIN/CREAT UR: 18.2 UG/MG (ref 0–30)
ANION GAP SERPL CALC-SCNC: 19 MMOL/L (ref 8–16)
ANION GAP SERPL CALC-SCNC: 19 MMOL/L (ref 8–16)
BACTERIA #/AREA URNS HPF: ABNORMAL /HPF
BASOPHILS # BLD AUTO: 0.04 K/UL (ref 0–0.2)
BASOPHILS # BLD AUTO: 0.04 K/UL (ref 0–0.2)
BASOPHILS NFR BLD: 0.5 % (ref 0–1.9)
BASOPHILS NFR BLD: 0.5 % (ref 0–1.9)
BILIRUB UR QL STRIP: NEGATIVE
BILIRUB UR QL STRIP: NEGATIVE
BUN SERPL-MCNC: 21 MG/DL (ref 8–23)
BUN SERPL-MCNC: 21 MG/DL (ref 8–23)
CALCIUM SERPL-MCNC: 10.3 MG/DL (ref 8.7–10.5)
CALCIUM SERPL-MCNC: 10.3 MG/DL (ref 8.7–10.5)
CHLORIDE SERPL-SCNC: 100 MMOL/L (ref 95–110)
CHLORIDE SERPL-SCNC: 100 MMOL/L (ref 95–110)
CLARITY UR: ABNORMAL
CLARITY UR: ABNORMAL
CO2 SERPL-SCNC: 23 MMOL/L (ref 23–29)
CO2 SERPL-SCNC: 23 MMOL/L (ref 23–29)
COLOR UR: YELLOW
COLOR UR: YELLOW
CREAT SERPL-MCNC: 1.2 MG/DL (ref 0.5–1.4)
CREAT SERPL-MCNC: 1.2 MG/DL (ref 0.5–1.4)
CREAT UR-MCNC: 66 MG/DL (ref 15–325)
DIFFERENTIAL METHOD: ABNORMAL
DIFFERENTIAL METHOD: ABNORMAL
EOSINOPHIL # BLD AUTO: 0.1 K/UL (ref 0–0.5)
EOSINOPHIL # BLD AUTO: 0.1 K/UL (ref 0–0.5)
EOSINOPHIL NFR BLD: 1.9 % (ref 0–8)
EOSINOPHIL NFR BLD: 1.9 % (ref 0–8)
ERYTHROCYTE [DISTWIDTH] IN BLOOD BY AUTOMATED COUNT: 12.5 % (ref 11.5–14.5)
ERYTHROCYTE [DISTWIDTH] IN BLOOD BY AUTOMATED COUNT: 12.5 % (ref 11.5–14.5)
EST. GFR  (NO RACE VARIABLE): 48 ML/MIN/1.73 M^2
EST. GFR  (NO RACE VARIABLE): 48 ML/MIN/1.73 M^2
FERRITIN SERPL-MCNC: 93 NG/ML (ref 20–300)
FERRITIN SERPL-MCNC: 93 NG/ML (ref 20–300)
GLUCOSE SERPL-MCNC: 105 MG/DL (ref 70–110)
GLUCOSE SERPL-MCNC: 105 MG/DL (ref 70–110)
GLUCOSE UR QL STRIP: NEGATIVE
GLUCOSE UR QL STRIP: NEGATIVE
HCT VFR BLD AUTO: 42.5 % (ref 37–48.5)
HCT VFR BLD AUTO: 42.5 % (ref 37–48.5)
HGB BLD-MCNC: 14 G/DL (ref 12–16)
HGB BLD-MCNC: 14 G/DL (ref 12–16)
HGB UR QL STRIP: NEGATIVE
HGB UR QL STRIP: NEGATIVE
HYALINE CASTS #/AREA URNS LPF: 1 /LPF
IMM GRANULOCYTES # BLD AUTO: 0.03 K/UL (ref 0–0.04)
IMM GRANULOCYTES # BLD AUTO: 0.03 K/UL (ref 0–0.04)
IMM GRANULOCYTES NFR BLD AUTO: 0.4 % (ref 0–0.5)
IMM GRANULOCYTES NFR BLD AUTO: 0.4 % (ref 0–0.5)
IRON SERPL-MCNC: 104 UG/DL (ref 30–160)
IRON SERPL-MCNC: 104 UG/DL (ref 30–160)
KETONES UR QL STRIP: NEGATIVE
KETONES UR QL STRIP: NEGATIVE
LEUKOCYTE ESTERASE UR QL STRIP: ABNORMAL
LEUKOCYTE ESTERASE UR QL STRIP: ABNORMAL
LYMPHOCYTES # BLD AUTO: 1.7 K/UL (ref 1–4.8)
LYMPHOCYTES # BLD AUTO: 1.7 K/UL (ref 1–4.8)
LYMPHOCYTES NFR BLD: 22.7 % (ref 18–48)
LYMPHOCYTES NFR BLD: 22.7 % (ref 18–48)
MAGNESIUM SERPL-MCNC: 2.1 MG/DL (ref 1.6–2.6)
MAGNESIUM SERPL-MCNC: 2.1 MG/DL (ref 1.6–2.6)
MCH RBC QN AUTO: 31.2 PG (ref 27–31)
MCH RBC QN AUTO: 31.2 PG (ref 27–31)
MCHC RBC AUTO-ENTMCNC: 32.9 G/DL (ref 32–36)
MCHC RBC AUTO-ENTMCNC: 32.9 G/DL (ref 32–36)
MCV RBC AUTO: 95 FL (ref 82–98)
MCV RBC AUTO: 95 FL (ref 82–98)
MICROALBUMIN UR DL<=1MG/L-MCNC: 12 UG/ML
MICROSCOPIC COMMENT: ABNORMAL
MONOCYTES # BLD AUTO: 0.8 K/UL (ref 0.3–1)
MONOCYTES # BLD AUTO: 0.8 K/UL (ref 0.3–1)
MONOCYTES NFR BLD: 10.8 % (ref 4–15)
MONOCYTES NFR BLD: 10.8 % (ref 4–15)
NEUTROPHILS # BLD AUTO: 4.7 K/UL (ref 1.8–7.7)
NEUTROPHILS # BLD AUTO: 4.7 K/UL (ref 1.8–7.7)
NEUTROPHILS NFR BLD: 63.7 % (ref 38–73)
NEUTROPHILS NFR BLD: 63.7 % (ref 38–73)
NITRITE UR QL STRIP: POSITIVE
NITRITE UR QL STRIP: POSITIVE
NRBC BLD-RTO: 0 /100 WBC
NRBC BLD-RTO: 0 /100 WBC
PH UR STRIP: 5 [PH] (ref 5–8)
PH UR STRIP: 5 [PH] (ref 5–8)
PHOSPHATE SERPL-MCNC: 2.6 MG/DL (ref 2.7–4.5)
PHOSPHATE SERPL-MCNC: 2.6 MG/DL (ref 2.7–4.5)
PLATELET # BLD AUTO: 191 K/UL (ref 150–450)
PLATELET # BLD AUTO: 191 K/UL (ref 150–450)
PMV BLD AUTO: 10.1 FL (ref 9.2–12.9)
PMV BLD AUTO: 10.1 FL (ref 9.2–12.9)
POTASSIUM SERPL-SCNC: 3.7 MMOL/L (ref 3.5–5.1)
POTASSIUM SERPL-SCNC: 3.7 MMOL/L (ref 3.5–5.1)
PROT UR QL STRIP: NEGATIVE
PROT UR QL STRIP: NEGATIVE
PROT UR-MCNC: <7 MG/DL (ref 0–15)
PROT UR-MCNC: <7 MG/DL (ref 0–15)
PROT/CREAT UR: NORMAL MG/G{CREAT} (ref 0–0.2)
PROT/CREAT UR: NORMAL MG/G{CREAT} (ref 0–0.2)
PTH-INTACT SERPL-MCNC: 105.8 PG/ML (ref 9–77)
PTH-INTACT SERPL-MCNC: 105.8 PG/ML (ref 9–77)
RBC # BLD AUTO: 4.49 M/UL (ref 4–5.4)
RBC # BLD AUTO: 4.49 M/UL (ref 4–5.4)
RBC #/AREA URNS HPF: 6 /HPF (ref 0–4)
SATURATED IRON: 29 % (ref 20–50)
SATURATED IRON: 29 % (ref 20–50)
SODIUM SERPL-SCNC: 142 MMOL/L (ref 136–145)
SODIUM SERPL-SCNC: 142 MMOL/L (ref 136–145)
SP GR UR STRIP: 1.01 (ref 1–1.03)
SP GR UR STRIP: 1.01 (ref 1–1.03)
SQUAMOUS #/AREA URNS HPF: 1 /HPF
TOTAL IRON BINDING CAPACITY: 354 UG/DL (ref 250–450)
TOTAL IRON BINDING CAPACITY: 354 UG/DL (ref 250–450)
TRANSFERRIN SERPL-MCNC: 239 MG/DL (ref 200–375)
TRANSFERRIN SERPL-MCNC: 239 MG/DL (ref 200–375)
URATE SERPL-MCNC: 9.4 MG/DL (ref 2.4–5.7)
URATE SERPL-MCNC: 9.4 MG/DL (ref 2.4–5.7)
URN SPEC COLLECT METH UR: ABNORMAL
URN SPEC COLLECT METH UR: ABNORMAL
UROBILINOGEN UR STRIP-ACNC: NEGATIVE EU/DL
UROBILINOGEN UR STRIP-ACNC: NEGATIVE EU/DL
WBC # BLD AUTO: 7.31 K/UL (ref 3.9–12.7)
WBC # BLD AUTO: 7.31 K/UL (ref 3.9–12.7)
WBC #/AREA URNS HPF: 38 /HPF (ref 0–5)
WBC CLUMPS URNS QL MICRO: ABNORMAL

## 2023-10-24 PROCEDURE — 83735 ASSAY OF MAGNESIUM: CPT | Performed by: INTERNAL MEDICINE

## 2023-10-24 PROCEDURE — 85025 COMPLETE CBC W/AUTO DIFF WBC: CPT | Performed by: INTERNAL MEDICINE

## 2023-10-24 PROCEDURE — 80069 RENAL FUNCTION PANEL: CPT | Performed by: INTERNAL MEDICINE

## 2023-10-24 PROCEDURE — 99999 PR PBB SHADOW E&M-EST. PATIENT-LVL III: CPT | Mod: PBBFAC,,,

## 2023-10-24 PROCEDURE — 84550 ASSAY OF BLOOD/URIC ACID: CPT | Performed by: INTERNAL MEDICINE

## 2023-10-24 PROCEDURE — 84466 ASSAY OF TRANSFERRIN: CPT | Performed by: INTERNAL MEDICINE

## 2023-10-24 PROCEDURE — 81000 URINALYSIS NONAUTO W/SCOPE: CPT | Performed by: INTERNAL MEDICINE

## 2023-10-24 PROCEDURE — 84156 ASSAY OF PROTEIN URINE: CPT | Performed by: INTERNAL MEDICINE

## 2023-10-24 PROCEDURE — 82043 UR ALBUMIN QUANTITATIVE: CPT | Performed by: INTERNAL MEDICINE

## 2023-10-24 PROCEDURE — 99999 PR PBB SHADOW E&M-EST. PATIENT-LVL III: ICD-10-PCS | Mod: PBBFAC,,,

## 2023-10-24 PROCEDURE — 83970 ASSAY OF PARATHORMONE: CPT | Performed by: INTERNAL MEDICINE

## 2023-10-24 PROCEDURE — 82728 ASSAY OF FERRITIN: CPT | Performed by: INTERNAL MEDICINE

## 2023-10-24 PROCEDURE — 36415 COLL VENOUS BLD VENIPUNCTURE: CPT | Performed by: INTERNAL MEDICINE

## 2023-10-24 NOTE — PROGRESS NOTES
Deflated balloon 10cc. Removed 16Fr catheter. Catheter tip grossly intact. Cleansed pt with betadine swab x 3 using aseptic technique. Using sterile technique inserted 16 Fr chino with lubrication for comfort. Visualized light yellow urine return. Inflated balloon with 10 cc sterile water. Connected to leg bag and secured to left leg with leg strap. Pt tolerated well. Pt will return in 1 month for nurse visit chino exchange. Appointment scheduled and given to patient

## 2023-11-24 ENCOUNTER — OFFICE VISIT (OUTPATIENT)
Dept: UROLOGY | Facility: CLINIC | Age: 73
End: 2023-11-24
Payer: MEDICARE

## 2023-11-24 VITALS — HEART RATE: 64 BPM | SYSTOLIC BLOOD PRESSURE: 136 MMHG | DIASTOLIC BLOOD PRESSURE: 78 MMHG

## 2023-11-24 DIAGNOSIS — R33.9 CHRONIC RETENTION OF URINE: ICD-10-CM

## 2023-11-24 DIAGNOSIS — R33.9 URINARY RETENTION: Primary | ICD-10-CM

## 2023-11-24 PROCEDURE — 1159F MED LIST DOCD IN RCRD: CPT | Mod: CPTII,S$GLB,, | Performed by: UROLOGY

## 2023-11-24 PROCEDURE — 1159F PR MEDICATION LIST DOCUMENTED IN MEDICAL RECORD: ICD-10-PCS | Mod: CPTII,S$GLB,, | Performed by: UROLOGY

## 2023-11-24 PROCEDURE — 1126F AMNT PAIN NOTED NONE PRSNT: CPT | Mod: CPTII,S$GLB,, | Performed by: UROLOGY

## 2023-11-24 PROCEDURE — 99213 OFFICE O/P EST LOW 20 MIN: CPT | Mod: S$GLB,,, | Performed by: UROLOGY

## 2023-11-24 PROCEDURE — 3061F NEG MICROALBUMINURIA REV: CPT | Mod: CPTII,S$GLB,, | Performed by: UROLOGY

## 2023-11-24 PROCEDURE — 3066F PR DOCUMENTATION OF TREATMENT FOR NEPHROPATHY: ICD-10-PCS | Mod: CPTII,S$GLB,, | Performed by: UROLOGY

## 2023-11-24 PROCEDURE — 1160F RVW MEDS BY RX/DR IN RCRD: CPT | Mod: CPTII,S$GLB,, | Performed by: UROLOGY

## 2023-11-24 PROCEDURE — 99999 PR PBB SHADOW E&M-EST. PATIENT-LVL II: ICD-10-PCS | Mod: PBBFAC,,, | Performed by: UROLOGY

## 2023-11-24 PROCEDURE — 99999 PR PBB SHADOW E&M-EST. PATIENT-LVL II: CPT | Mod: PBBFAC,,, | Performed by: UROLOGY

## 2023-11-24 PROCEDURE — 4010F ACE/ARB THERAPY RXD/TAKEN: CPT | Mod: CPTII,S$GLB,, | Performed by: UROLOGY

## 2023-11-24 PROCEDURE — 3066F NEPHROPATHY DOC TX: CPT | Mod: CPTII,S$GLB,, | Performed by: UROLOGY

## 2023-11-24 PROCEDURE — 1126F PR PAIN SEVERITY QUANTIFIED, NO PAIN PRESENT: ICD-10-PCS | Mod: CPTII,S$GLB,, | Performed by: UROLOGY

## 2023-11-24 PROCEDURE — 3078F PR MOST RECENT DIASTOLIC BLOOD PRESSURE < 80 MM HG: ICD-10-PCS | Mod: CPTII,S$GLB,, | Performed by: UROLOGY

## 2023-11-24 PROCEDURE — 3061F PR NEG MICROALBUMINURIA RESULT DOCUMENTED/REVIEW: ICD-10-PCS | Mod: CPTII,S$GLB,, | Performed by: UROLOGY

## 2023-11-24 PROCEDURE — 3078F DIAST BP <80 MM HG: CPT | Mod: CPTII,S$GLB,, | Performed by: UROLOGY

## 2023-11-24 PROCEDURE — 3075F PR MOST RECENT SYSTOLIC BLOOD PRESS GE 130-139MM HG: ICD-10-PCS | Mod: CPTII,S$GLB,, | Performed by: UROLOGY

## 2023-11-24 PROCEDURE — 1160F PR REVIEW ALL MEDS BY PRESCRIBER/CLIN PHARMACIST DOCUMENTED: ICD-10-PCS | Mod: CPTII,S$GLB,, | Performed by: UROLOGY

## 2023-11-24 PROCEDURE — 4010F PR ACE/ARB THEARPY RXD/TAKEN: ICD-10-PCS | Mod: CPTII,S$GLB,, | Performed by: UROLOGY

## 2023-11-24 PROCEDURE — 3075F SYST BP GE 130 - 139MM HG: CPT | Mod: CPTII,S$GLB,, | Performed by: UROLOGY

## 2023-11-24 PROCEDURE — 99213 PR OFFICE/OUTPT VISIT, EST, LEVL III, 20-29 MIN: ICD-10-PCS | Mod: S$GLB,,, | Performed by: UROLOGY

## 2023-11-24 RX ORDER — LEVOTHYROXINE SODIUM 100 UG/1
100 TABLET ORAL
COMMUNITY
Start: 2023-11-21

## 2023-11-24 NOTE — PROGRESS NOTES
Subjective:       Patient ID: Socorro Lucas is a 73 y.o. female.    Chief Complaint: No chief complaint on file.     This is a 73 y.o.  female patient that is an established patient. The patient was referred to me by PCP for multiple issues: recurrent UTI, dysuria, hematuria, h/o stones.  H/o recurrent UTIs, no cultures available.  States currently with pain to lower abdomen.  No flank pain.  Recently had hematuria with UTI per report, dark red urine with small clots, past smoker.  Reports UTI every 1-2 months and treated with antibiotic by PCP.  Symptoms: abd pain, frequency, urgency, gross hematuria.  H/o stones in 2015 and 2016 requiring intervention,  Started K citrate at the time.       PVR elevated, chronic Rodriguez catheter placed.    Monthly cath changes.  Pelvic exam 11/24/23 with no erosion.      Lab Results   Component Value Date    CREATININE 1.2 10/24/2023    CREATININE 1.2 10/24/2023       ---  PMH/PSH/PFH/Meds/Allergies/Social reviewed as in chart.           Review of Systems   Constitutional:  Negative for activity change, appetite change, chills and fever.   Respiratory:  Negative for cough, chest tightness and shortness of breath.    Cardiovascular:  Negative for chest pain.   Gastrointestinal:  Negative for abdominal distention, abdominal pain, nausea and vomiting.   Genitourinary:  Positive for difficulty urinating. Negative for dysuria, flank pain, frequency, hematuria, pelvic pain and urgency.   Neurological:  Negative for dizziness.   Hematological:  Does not bruise/bleed easily.   Psychiatric/Behavioral:  Negative for agitation.        Objective:      Physical Exam  HENT:      Head: Normocephalic.   Cardiovascular:      Pulses: Normal pulses.   Pulmonary:      Effort: Pulmonary effort is normal.   Abdominal:      General: Abdomen is flat. There is no distension.      Palpations: Abdomen is soft.      Tenderness: There is no abdominal tenderness. There is no right CVA tenderness, left  CVA tenderness or guarding.   Musculoskeletal:      Cervical back: Normal range of motion.   Skin:     General: Skin is warm.   Neurological:      General: No focal deficit present.      Mental Status: She is alert.           No results found for this or any previous visit (from the past 2160 hour(s)).  UDS:       No void, normal compliance bilateral grade 1 reflux    Assessment:     Problem Noted   Gross Hematuria 2/14/2022    With recent UTI per patient no culture available, past smoker.   --CTU 2/22: bilateral hydroureter, abrupt contrast cut off on left, distended bladder/trabeculation  --4/4/22 cysto, bilateral RPGs, left URS, cystogram--findings no left lesions, chronic cystitis, bilateral reflux w/o significant hydronephrosis     Urinary Retention 4/12/2022    , 280 and on cystoscopy, has BL reflux   Cath placed 4/4/22  UDS 4/22:  No void, normal compliance bilateral grade 1 reflux     Kidney Stones 10/7/2015    Treatment in 2015 and 2016, was on K citra in past  2mm left stone on CTU 2/2022     Recurrent Uti (Resolved) 2/14/2022    Enterococcus 2/2022 treated  Retention as above with reflux     Nephrolithiasis (Resolved) 9/14/2016   Lesion of Bladder (Resolved) 9/14/2016       Plan:     1.  16 Armenian Rodriguez changed by nurse Shaina  2.  Follow up in 1 month for change with nurse  3.  No evidence of erosion, discussed SPT, wishes to continue with chronic urethral catheter for now.  4.  See me in 6 months.      Ashu Riley MD

## 2023-11-24 NOTE — PROGRESS NOTES
16F Rodriguez cath exchanged using sterile technique. Patient tolerated this well, clear yellow urine was noted in the bag immediately. Patient was given one month nurse visit appt for cath change.

## 2023-12-28 ENCOUNTER — CLINICAL SUPPORT (OUTPATIENT)
Dept: UROLOGY | Facility: CLINIC | Age: 73
End: 2023-12-28
Payer: MEDICARE

## 2023-12-28 VITALS
WEIGHT: 173.06 LBS | HEIGHT: 61 IN | DIASTOLIC BLOOD PRESSURE: 84 MMHG | BODY MASS INDEX: 32.67 KG/M2 | HEART RATE: 70 BPM | SYSTOLIC BLOOD PRESSURE: 154 MMHG

## 2023-12-28 DIAGNOSIS — R33.9 URINARY RETENTION: Primary | ICD-10-CM

## 2023-12-28 PROCEDURE — 99999 PR PBB SHADOW E&M-EST. PATIENT-LVL III: CPT | Mod: PBBFAC,,,

## 2023-12-28 PROCEDURE — 99999 PR PBB SHADOW E&M-EST. PATIENT-LVL III: ICD-10-PCS | Mod: PBBFAC,,,

## 2023-12-28 NOTE — PROGRESS NOTES
Deflated balloon 10cc. Removed 16Fr catheter. Catheter tip grossly intact. Cleansed pt with betadine swab x 3 using aseptic technique. Using sterile technique inserted 16 Fr chino with lubrication for comfort. Visualized light yellow urine return. Inflated balloon with 10 cc sterile water. Connected to leg bag and secured to right leg with leg strap. Pt tolerated well. Pt will return in 1 month for nurse visit chino exchange. Appointment scheduled and given to patient

## 2024-01-29 ENCOUNTER — CLINICAL SUPPORT (OUTPATIENT)
Dept: UROLOGY | Facility: CLINIC | Age: 74
End: 2024-01-29
Payer: MEDICARE

## 2024-01-29 VITALS — HEART RATE: 58 BPM

## 2024-01-29 DIAGNOSIS — R33.9 URINARY RETENTION: Primary | ICD-10-CM

## 2024-01-29 PROCEDURE — 99999 PR PBB SHADOW E&M-EST. PATIENT-LVL III: CPT | Mod: PBBFAC,,,

## 2024-01-30 ENCOUNTER — OFFICE VISIT (OUTPATIENT)
Dept: UROLOGY | Facility: CLINIC | Age: 74
End: 2024-01-30
Payer: MEDICARE

## 2024-01-30 VITALS
HEIGHT: 61 IN | WEIGHT: 173.06 LBS | DIASTOLIC BLOOD PRESSURE: 60 MMHG | BODY MASS INDEX: 32.67 KG/M2 | SYSTOLIC BLOOD PRESSURE: 146 MMHG | HEART RATE: 55 BPM

## 2024-01-30 DIAGNOSIS — S37.20XA INJURY OF BLADDER, INITIAL ENCOUNTER: ICD-10-CM

## 2024-01-30 DIAGNOSIS — R33.9 URINARY RETENTION: Primary | ICD-10-CM

## 2024-01-30 PROCEDURE — 1126F AMNT PAIN NOTED NONE PRSNT: CPT | Mod: CPTII,S$GLB,, | Performed by: UROLOGY

## 2024-01-30 PROCEDURE — 3288F FALL RISK ASSESSMENT DOCD: CPT | Mod: CPTII,S$GLB,, | Performed by: UROLOGY

## 2024-01-30 PROCEDURE — 3078F DIAST BP <80 MM HG: CPT | Mod: CPTII,S$GLB,, | Performed by: UROLOGY

## 2024-01-30 PROCEDURE — 99999 PR PBB SHADOW E&M-EST. PATIENT-LVL V: CPT | Mod: PBBFAC,,, | Performed by: UROLOGY

## 2024-01-30 PROCEDURE — 1101F PT FALLS ASSESS-DOCD LE1/YR: CPT | Mod: CPTII,S$GLB,, | Performed by: UROLOGY

## 2024-01-30 PROCEDURE — 3008F BODY MASS INDEX DOCD: CPT | Mod: CPTII,S$GLB,, | Performed by: UROLOGY

## 2024-01-30 PROCEDURE — 1160F RVW MEDS BY RX/DR IN RCRD: CPT | Mod: CPTII,S$GLB,, | Performed by: UROLOGY

## 2024-01-30 PROCEDURE — 99214 OFFICE O/P EST MOD 30 MIN: CPT | Mod: S$GLB,,, | Performed by: UROLOGY

## 2024-01-30 PROCEDURE — 1159F MED LIST DOCD IN RCRD: CPT | Mod: CPTII,S$GLB,, | Performed by: UROLOGY

## 2024-01-30 PROCEDURE — 3077F SYST BP >= 140 MM HG: CPT | Mod: CPTII,S$GLB,, | Performed by: UROLOGY

## 2024-01-30 RX ORDER — AMOXICILLIN AND CLAVULANATE POTASSIUM 875; 125 MG/1; MG/1
1 TABLET, FILM COATED ORAL EVERY 12 HOURS
Qty: 14 TABLET | Refills: 0 | Status: SHIPPED | OUTPATIENT
Start: 2024-02-07 | End: 2024-02-14

## 2024-01-30 RX ORDER — CEFAZOLIN SODIUM 2 G/50ML
2 SOLUTION INTRAVENOUS
Status: CANCELLED | OUTPATIENT
Start: 2024-01-30

## 2024-01-30 NOTE — PROGRESS NOTES
Subjective:       Patient ID: Socorro Lucas is a 73 y.o. female.    Chief Complaint: Follow-up (Discuss SPT)     This is a 73 y.o.  female patient that is an established patient. The patient was referred to me by PCP for multiple issues: recurrent UTI, dysuria, hematuria, h/o stones.  H/o recurrent UTIs, no cultures available.  States currently with pain to lower abdomen.  No flank pain.  Recently had hematuria with UTI per report, dark red urine with small clots, past smoker.  Reports UTI every 1-2 months and treated with antibiotic by PCP.  Symptoms: abd pain, frequency, urgency, gross hematuria.  H/o stones in 2015 and 2016 requiring intervention,  Started K citrate at the time.       PVR elevated, chronic Rodriguez catheter placed.    Monthly cath changes.  Pelvic exam 11/24/23 with no erosion.    Here to discuss SPT.      Lab Results   Component Value Date    CREATININE 1.2 10/24/2023    CREATININE 1.2 10/24/2023       ---  PMH/PSH/PFH/Meds/Allergies/Social reviewed as in chart.           Review of Systems   Constitutional:  Negative for activity change, appetite change, chills and fever.   Respiratory:  Negative for cough, chest tightness and shortness of breath.    Cardiovascular:  Negative for chest pain.   Gastrointestinal:  Negative for abdominal distention, abdominal pain, nausea and vomiting.   Genitourinary:  Positive for difficulty urinating. Negative for dysuria, flank pain, frequency, hematuria, pelvic pain and urgency.   Neurological:  Negative for dizziness.   Hematological:  Does not bruise/bleed easily.   Psychiatric/Behavioral:  Negative for agitation.        Objective:      Physical Exam  HENT:      Head: Normocephalic.   Cardiovascular:      Pulses: Normal pulses.   Pulmonary:      Effort: Pulmonary effort is normal.   Abdominal:      General: Abdomen is flat. There is no distension.      Palpations: Abdomen is soft.      Tenderness: There is no abdominal tenderness. There is no right  CVA tenderness, left CVA tenderness or guarding.   Musculoskeletal:      Cervical back: Normal range of motion.   Skin:     General: Skin is warm.   Neurological:      General: No focal deficit present.      Mental Status: She is alert.           No results found for this or any previous visit (from the past 2160 hour(s)).  UDS:       No void, normal compliance bilateral grade 1 reflux    Assessment:     Problem Noted   Gross Hematuria 2/14/2022    With recent UTI per patient no culture available, past smoker.   --CTU 2/22: bilateral hydroureter, abrupt contrast cut off on left, distended bladder/trabeculation  --4/4/22 cysto, bilateral RPGs, left URS, cystogram--findings no left lesions, chronic cystitis, bilateral reflux w/o significant hydronephrosis     Urinary Retention 4/12/2022    , 280 and on cystoscopy, has BL reflux   Cath placed 4/4/22  UDS 4/22:  No void, normal compliance bilateral grade 1 reflux     Kidney Stones 10/7/2015    Treatment in 2015 and 2016, was on K citra in past  2mm left stone on CTU 2/2022     Recurrent Uti (Resolved) 2/14/2022    Enterococcus 2/2022 treated  Retention as above with reflux     Nephrolithiasis (Resolved) 9/14/2016   Lesion of Bladder (Resolved) 9/14/2016       Plan:     1.  Discussed SPT in detail.  Wishes to proceed.  Risks, benefits and alternatives discussed.  Discussed care and changing of tube, discomfort at site, etc.   2.  Augmentin to start prior to procedure     Ashu Riley MD

## 2024-02-12 ENCOUNTER — HOSPITAL ENCOUNTER (OUTPATIENT)
Facility: HOSPITAL | Age: 74
Discharge: HOME | End: 2024-02-12
Attending: UROLOGY | Admitting: UROLOGY
Payer: MEDICARE

## 2024-02-12 ENCOUNTER — ANESTHESIA (OUTPATIENT)
Dept: SURGERY | Facility: HOSPITAL | Age: 74
End: 2024-02-12
Payer: MEDICARE

## 2024-02-12 ENCOUNTER — ANESTHESIA EVENT (OUTPATIENT)
Dept: SURGERY | Facility: HOSPITAL | Age: 74
End: 2024-02-12
Payer: MEDICARE

## 2024-02-12 VITALS
BODY MASS INDEX: 31.91 KG/M2 | RESPIRATION RATE: 17 BRPM | HEART RATE: 56 BPM | HEIGHT: 61 IN | TEMPERATURE: 98 F | DIASTOLIC BLOOD PRESSURE: 73 MMHG | SYSTOLIC BLOOD PRESSURE: 166 MMHG | WEIGHT: 169 LBS | OXYGEN SATURATION: 96 %

## 2024-02-12 DIAGNOSIS — R33.9 URINARY RETENTION: Primary | ICD-10-CM

## 2024-02-12 DIAGNOSIS — N35.919 URETHRAL STRICTURE: ICD-10-CM

## 2024-02-12 LAB
ANION GAP SERPL CALC-SCNC: 12 MMOL/L (ref 8–16)
APTT PPP: 27.9 SEC (ref 21–32)
BUN SERPL-MCNC: 17 MG/DL (ref 8–23)
CALCIUM SERPL-MCNC: 9.8 MG/DL (ref 8.7–10.5)
CHLORIDE SERPL-SCNC: 108 MMOL/L (ref 95–110)
CO2 SERPL-SCNC: 24 MMOL/L (ref 23–29)
CREAT SERPL-MCNC: 1 MG/DL (ref 0.5–1.4)
EST. GFR  (NO RACE VARIABLE): 59 ML/MIN/1.73 M^2
GLUCOSE SERPL-MCNC: 103 MG/DL (ref 70–110)
INR PPP: 1.1 (ref 0.8–1.2)
POTASSIUM SERPL-SCNC: 4.1 MMOL/L (ref 3.5–5.1)
PROTHROMBIN TIME: 11.4 SEC (ref 9–12.5)
SODIUM SERPL-SCNC: 144 MMOL/L (ref 136–145)

## 2024-02-12 PROCEDURE — D9220A PRA ANESTHESIA: Mod: ANES,,, | Performed by: ANESTHESIOLOGY

## 2024-02-12 PROCEDURE — 63600175 PHARM REV CODE 636 W HCPCS: Performed by: NURSE ANESTHETIST, CERTIFIED REGISTERED

## 2024-02-12 PROCEDURE — 36415 COLL VENOUS BLD VENIPUNCTURE: CPT | Performed by: UROLOGY

## 2024-02-12 PROCEDURE — 85610 PROTHROMBIN TIME: CPT | Performed by: UROLOGY

## 2024-02-12 PROCEDURE — 71000016 HC POSTOP RECOV ADDL HR: Performed by: UROLOGY

## 2024-02-12 PROCEDURE — 80048 BASIC METABOLIC PNL TOTAL CA: CPT | Performed by: UROLOGY

## 2024-02-12 PROCEDURE — 36000707: Performed by: UROLOGY

## 2024-02-12 PROCEDURE — 36000706: Performed by: UROLOGY

## 2024-02-12 PROCEDURE — 85730 THROMBOPLASTIN TIME PARTIAL: CPT | Performed by: UROLOGY

## 2024-02-12 PROCEDURE — 71000015 HC POSTOP RECOV 1ST HR: Performed by: UROLOGY

## 2024-02-12 PROCEDURE — 37000008 HC ANESTHESIA 1ST 15 MINUTES: Performed by: UROLOGY

## 2024-02-12 PROCEDURE — 63600175 PHARM REV CODE 636 W HCPCS: Performed by: UROLOGY

## 2024-02-12 PROCEDURE — 25000003 PHARM REV CODE 250: Performed by: UROLOGY

## 2024-02-12 PROCEDURE — D9220A PRA ANESTHESIA: Mod: CRNA,,, | Performed by: NURSE ANESTHETIST, CERTIFIED REGISTERED

## 2024-02-12 PROCEDURE — 25000003 PHARM REV CODE 250: Performed by: ANESTHESIOLOGY

## 2024-02-12 PROCEDURE — 37000009 HC ANESTHESIA EA ADD 15 MINS: Performed by: UROLOGY

## 2024-02-12 PROCEDURE — 51102 DRAIN BL W/CATH INSERTION: CPT | Mod: ,,, | Performed by: UROLOGY

## 2024-02-12 DEVICE — IMPLANTABLE DEVICE: Type: IMPLANTABLE DEVICE | Site: URINARY BLADDER | Status: FUNCTIONAL

## 2024-02-12 RX ORDER — LIDOCAINE HYDROCHLORIDE 10 MG/ML
INJECTION, SOLUTION EPIDURAL; INFILTRATION; INTRACAUDAL; PERINEURAL
Status: DISCONTINUED | OUTPATIENT
Start: 2024-02-12 | End: 2024-02-12 | Stop reason: HOSPADM

## 2024-02-12 RX ORDER — TRAMADOL HYDROCHLORIDE 50 MG/1
50 TABLET ORAL EVERY 6 HOURS PRN
Qty: 8 TABLET | Refills: 0 | Status: SHIPPED | OUTPATIENT
Start: 2024-02-12 | End: 2024-02-14

## 2024-02-12 RX ORDER — OXYCODONE AND ACETAMINOPHEN 5; 325 MG/1; MG/1
1 TABLET ORAL
Status: DISCONTINUED | OUTPATIENT
Start: 2024-02-12 | End: 2024-02-12 | Stop reason: HOSPADM

## 2024-02-12 RX ORDER — ONDANSETRON HYDROCHLORIDE 2 MG/ML
4 INJECTION, SOLUTION INTRAVENOUS DAILY PRN
Status: DISCONTINUED | OUTPATIENT
Start: 2024-02-12 | End: 2024-02-12 | Stop reason: HOSPADM

## 2024-02-12 RX ORDER — PROPOFOL 10 MG/ML
VIAL (ML) INTRAVENOUS
Status: DISCONTINUED | OUTPATIENT
Start: 2024-02-12 | End: 2024-02-12

## 2024-02-12 RX ORDER — KETAMINE HCL IN 0.9 % NACL 50 MG/5 ML
SYRINGE (ML) INTRAVENOUS
Status: DISCONTINUED | OUTPATIENT
Start: 2024-02-12 | End: 2024-02-12

## 2024-02-12 RX ORDER — PROCHLORPERAZINE EDISYLATE 5 MG/ML
5 INJECTION INTRAMUSCULAR; INTRAVENOUS EVERY 30 MIN PRN
Status: DISCONTINUED | OUTPATIENT
Start: 2024-02-12 | End: 2024-02-12 | Stop reason: HOSPADM

## 2024-02-12 RX ORDER — FENTANYL CITRATE 50 UG/ML
25 INJECTION, SOLUTION INTRAMUSCULAR; INTRAVENOUS EVERY 5 MIN PRN
Status: DISCONTINUED | OUTPATIENT
Start: 2024-02-12 | End: 2024-02-12 | Stop reason: HOSPADM

## 2024-02-12 RX ORDER — PROPOFOL 10 MG/ML
VIAL (ML) INTRAVENOUS CONTINUOUS PRN
Status: DISCONTINUED | OUTPATIENT
Start: 2024-02-12 | End: 2024-02-12

## 2024-02-12 RX ORDER — CEFAZOLIN SODIUM 2 G/50ML
2 SOLUTION INTRAVENOUS
Status: COMPLETED | OUTPATIENT
Start: 2024-02-12 | End: 2024-02-12

## 2024-02-12 RX ORDER — TRAMADOL HYDROCHLORIDE 50 MG/1
50 TABLET ORAL EVERY 6 HOURS PRN
Status: DISCONTINUED | OUTPATIENT
Start: 2024-02-12 | End: 2024-02-12 | Stop reason: HOSPADM

## 2024-02-12 RX ORDER — ACETAMINOPHEN AND CODEINE PHOSPHATE 300; 30 MG/1; MG/1
1 TABLET ORAL EVERY 6 HOURS PRN
Qty: 10 TABLET | Refills: 0 | Status: SHIPPED | OUTPATIENT
Start: 2024-02-12 | End: 2024-02-12 | Stop reason: HOSPADM

## 2024-02-12 RX ORDER — HYDROMORPHONE HYDROCHLORIDE 2 MG/ML
0.2 INJECTION, SOLUTION INTRAMUSCULAR; INTRAVENOUS; SUBCUTANEOUS EVERY 5 MIN PRN
Status: DISCONTINUED | OUTPATIENT
Start: 2024-02-12 | End: 2024-02-12 | Stop reason: HOSPADM

## 2024-02-12 RX ADMIN — CEFAZOLIN SODIUM 2 G: 2 SOLUTION INTRAVENOUS at 09:02

## 2024-02-12 RX ADMIN — SODIUM CHLORIDE, SODIUM LACTATE, POTASSIUM CHLORIDE, AND CALCIUM CHLORIDE: .6; .31; .03; .02 INJECTION, SOLUTION INTRAVENOUS at 09:02

## 2024-02-12 RX ADMIN — PROPOFOL 60 MG: 10 INJECTION, EMULSION INTRAVENOUS at 09:02

## 2024-02-12 RX ADMIN — TRAMADOL HYDROCHLORIDE 50 MG: 50 TABLET, COATED ORAL at 10:02

## 2024-02-12 RX ADMIN — PROPOFOL 80 MCG/KG/MIN: 10 INJECTION, EMULSION INTRAVENOUS at 09:02

## 2024-02-12 NOTE — ANESTHESIA PREPROCEDURE EVALUATION
02/12/2024  Socorro Lucas is a 73 y.o., female.      Pre-op Assessment    I have reviewed the Patient Summary Reports.     I have reviewed the Nursing Notes. I have reviewed the NPO Status.   I have reviewed the Medications.     Review of Systems  Anesthesia Hx:             Denies Family Hx of Anesthesia complications.    Denies Personal Hx of Anesthesia complications.                    Cardiovascular:     Hypertension                                        Pulmonary:   COPD                     Renal/:  Chronic Renal Disease, CKD                    Physical Exam  General: Well nourished    Airway:  Mallampati: II   Mouth Opening: Normal  TM Distance: Normal    Chest/Lungs:  Normal Respiratory Rate        Anesthesia Plan  Type of Anesthesia, risks & benefits discussed:    Anesthesia Type: MAC, Gen Natural Airway  Intra-op Monitoring Plan: Standard ASA Monitors  Induction:  IV  Informed Consent: Informed consent signed with the Patient and all parties understand the risks and agree with anesthesia plan.  All questions answered.   ASA Score: 3  Day of Surgery Review of History & Physical: H&P Update referred to the surgeon/provider.    Ready For Surgery From Anesthesia Perspective.     .

## 2024-02-12 NOTE — TRANSFER OF CARE
"Anesthesia Transfer of Care Note    Patient: Socorro Lucas    Procedure(s) Performed: Procedure(s) (LRB):  CYSTOSCOPY (N/A)  INSERTION, SUPRAPUBIC CATHETER (N/A)    Patient location: PACU    Anesthesia Type: general    Transport from OR: Transported from OR on room air with adequate spontaneous ventilation    Post pain: adequate analgesia    Post assessment: no apparent anesthetic complications    Post vital signs: stable    Level of consciousness: awake and alert    Nausea/Vomiting: no nausea/vomiting    Complications: none    Transfer of care protocol was followed    Last vitals: Visit Vitals  BP (!) 148/81   Pulse 65   Temp 37.3 °C (99.1 °F) (Skin)   Resp 18   Ht 5' 1" (1.549 m)   Wt 76.7 kg (169 lb)   SpO2 (!) 93%   Breastfeeding No   BMI 31.93 kg/m²     "

## 2024-02-12 NOTE — ANESTHESIA POSTPROCEDURE EVALUATION
Anesthesia Post Evaluation    Patient: Socorro Lucas    Procedure(s) Performed: Procedure(s) (LRB):  CYSTOSCOPY (N/A)  INSERTION, SUPRAPUBIC CATHETER (N/A)    Final Anesthesia Type: general      Patient location during evaluation: PACU  Patient participation: Yes- Able to Participate  Level of consciousness: awake and alert  Post-procedure vital signs: reviewed and stable  Pain management: adequate  Airway patency: patent    PONV status at discharge: No PONV  Anesthetic complications: no      Cardiovascular status: stable  Respiratory status: spontaneous ventilation  Hydration status: euvolemic  Follow-up not needed.              Vitals Value Taken Time   /77 02/12/24 1010   Temp 36.7 °C (98.1 °F) 02/12/24 1010   Pulse 52 02/12/24 1010   Resp 16 02/12/24 1038   SpO2 98 % 02/12/24 1010         No case tracking events are documented in the log.      Pain/Marcela Score: Pain Rating Prior to Med Admin: 5 (2/12/2024 10:38 AM)

## 2024-02-12 NOTE — DISCHARGE INSTRUCTIONS
Postoperative Instructions  No heavy lifting greater than 10 pounds or a gallon of milk  Do not strain to have a bowel movement  No strenuous exercise  You may sponge bathe  No driving while you are on narcotic pain medications or if your chino  catheter is in place  No showering until 48 hours after surgery or 48 hours after last drain removed.    Call the doctor if:  Temperature is greater than 101F  Persistent vomiting and inability to keep food down  Inability to pee    If you have a drain, please record the output and color and bring sheet with you to your appointment.    You will return in 4 weeks to clinic for suprapubic catheter exchange.  You may take Tylenol 3 for breakthrough pain control.  Okay to take Ibuprofen and other home medications.

## 2024-02-12 NOTE — DISCHARGE SUMMARY
[facility] ([facility])  Discharge Note  Short Stay    Procedure(s) (LRB):  CYSTOSCOPY (N/A)  INSERTION, SUPRAPUBIC CATHETER (N/A)      OUTCOME: Patient tolerated treatment/procedure well without complication and is now ready for discharge.    DISPOSITION: Home or Self Care    FINAL DIAGNOSIS:  Urinary retention    FOLLOWUP: In clinic in 4 weeks for SP tube exchange    DISCHARGE INSTRUCTIONS:    Discharge Procedure Orders   Notify your health care provider if you experience any of the following:  temperature >100.4     Notify your health care provider if you experience any of the following:  persistent nausea and vomiting or diarrhea     Notify your health care provider if you experience any of the following:  severe uncontrolled pain     Notify your health care provider if you experience any of the following:  redness, tenderness, or signs of infection (pain, swelling, redness, odor or green/yellow discharge around incision site)     Notify your health care provider if you experience any of the following:  worsening rash     Notify your health care provider if you experience any of the following:  persistent dizziness, light-headedness, or visual disturbances        TIME SPENT ON DISCHARGE: 10 minutes

## 2024-02-12 NOTE — INTERVAL H&P NOTE
The patient has been examined and the H&P has been reviewed:    I concur with the findings and no changes have occurred since H&P was written.    Anesthesia/Surgery risks, benefits and alternative options discussed and understood by patient/family.      Problem Noted   Gross Hematuria 2/14/2022    With recent UTI per patient no culture available, past smoker.   --CTU 2/22: bilateral hydroureter, abrupt contrast cut off on left, distended bladder/trabeculation  --4/4/22 cysto, bilateral RPGs, left URS, cystogram--findings no left lesions, chronic cystitis, bilateral reflux w/o significant hydronephrosis     Urinary Retention 4/12/2022    , 280 and on cystoscopy, has BL reflux   Cath placed 4/4/22  UDS 4/22:  No void, normal compliance bilateral grade 1 reflux     Kidney Stones 10/7/2015    Treatment in 2015 and 2016, was on K citra in past  2mm left stone on CTU 2/2022     Recurrent Uti (Resolved) 2/14/2022    Enterococcus 2/2022 treated  Retention as above with reflux     Nephrolithiasis (Resolved) 9/14/2016   Lesion of Bladder (Resolved) 9/14/2016     OR for cystoscopy, SPT placement  The risks, benefits, alteratives of the procedure were discussed with the patient.  The patient was given time for questions, all questions were answered.  Written, informed consent was obtained.      Ashu Riley MD

## 2024-02-12 NOTE — OP NOTE
[facility])  Urology Department  Operative Note    Date of Procedure: 2/12/2024     Pre-Operative Diagnosis:   Urinary retention [R33.9]    Post-Operative Diagnosis: same    Procedure:   Cystoscopy with insertion of suprapubic catheter    Primary: Ashu Riley MD    Assisting Surgeon: None    Anesthesia: Monitor Anesthesia Care    Estimated Blood Loss (EBL): min     Drains: 16 Fr suprapubic catheter    Specimens: none    Indications:   Socorro Lucas is a 73 y.o. female with urinary retention secondary to neurogenic bladder. After the risks, benefits, and alternatives were discussed and all questions were answered to her satisfaction, she elected to proceed with surgery.    Operative Findings:  suprapubic catheter placed in standard fashion.   Normal cystoscopy     Procedure in Detail:  After informed consent was obtained, the patient was brought to the operating suite and placed int he supine position.  SCDs were applied and working.  Anesthesia was administered.  The patient was then placed in the supine position and the urethral catheter was removed.  The patient was prepped and draped in the usual sterile fashion.       A flexible cystoscope was advanced into the patient's bladder via the urethra.  The entire urethra was visualized which showed no evidence of urethral strictures or abnormalities. The scope passed easily. The right and left ureteral orifices were identified in the normal anatomic position. Cystoscopy revealed no masses or lesions suspicious for malignancy.    The bladder was filled with irrigation until it was distended.    The superior border of the pubic bone was marked. A lilian was made 2 finger breadths superior to this in the midline.  A horizontal 2 cm incision was made at this location with a 15 blade scalpel. We then dissected to the level of the fascia. This was knicked with the scalpel and there was no bowel or obstruction. A 16 Fr Rodriguez with a metal catheter  guide was inserted through the incision and fascia to the bladder. The bladder wall was noted to deflect on cystoscopy.  The catheter guide was removed. The balloon was inflated with 10 cc sterile water. The bag was connected to a drainage  bag.       The suprapubic catheter was secured in place with a 2-0 nylon and closed the skin incision.  Sterile dressing was applied.       Disposition: They will follow up with Dr. Riley in 4 weeks for SP tube exchange.     Rashawn Casper MD       I was present and scrubbed for the entirety of the procedure.  I agree with the operative note and have edited as needed.    Ashu Riley MD

## 2024-03-12 ENCOUNTER — OFFICE VISIT (OUTPATIENT)
Dept: UROLOGY | Facility: CLINIC | Age: 74
End: 2024-03-12
Payer: MEDICARE

## 2024-03-12 VITALS — DIASTOLIC BLOOD PRESSURE: 79 MMHG | HEART RATE: 60 BPM | SYSTOLIC BLOOD PRESSURE: 169 MMHG

## 2024-03-12 DIAGNOSIS — Z93.59 SUPRAPUBIC CATHETER: ICD-10-CM

## 2024-03-12 DIAGNOSIS — Z98.890 S/P UROLOGICAL SURGERY: ICD-10-CM

## 2024-03-12 DIAGNOSIS — R33.9 URINARY RETENTION: Primary | ICD-10-CM

## 2024-03-12 PROCEDURE — 1159F MED LIST DOCD IN RCRD: CPT | Mod: CPTII,S$GLB,,

## 2024-03-12 PROCEDURE — 99999 PR PBB SHADOW E&M-EST. PATIENT-LVL III: CPT | Mod: PBBFAC,,,

## 2024-03-12 PROCEDURE — 3078F DIAST BP <80 MM HG: CPT | Mod: CPTII,S$GLB,,

## 2024-03-12 PROCEDURE — 1126F AMNT PAIN NOTED NONE PRSNT: CPT | Mod: CPTII,S$GLB,,

## 2024-03-12 PROCEDURE — 3077F SYST BP >= 140 MM HG: CPT | Mod: CPTII,S$GLB,,

## 2024-03-12 PROCEDURE — 1160F RVW MEDS BY RX/DR IN RCRD: CPT | Mod: CPTII,S$GLB,,

## 2024-03-12 PROCEDURE — 99212 OFFICE O/P EST SF 10 MIN: CPT | Mod: S$GLB,,,

## 2024-03-12 PROCEDURE — 4010F ACE/ARB THERAPY RXD/TAKEN: CPT | Mod: CPTII,S$GLB,,

## 2024-03-12 NOTE — PROGRESS NOTES
Subjective:       Patient ID: Socorro Lucas is a 73 y.o. female.    Chief Complaint: SPT change     This is a 73 y.o.  female patient that is new to me but not new to the system.  This is a patient of Dr. Riley who had a SPT placed on 2/12/24. Patient has a history of recurrent UTIs, nephrolithiasis, and urinary retention requiring a chronic chino catheter. Patient is here today for SPT change. No other complaints today.       Lab Results   Component Value Date    CREATININE 1.0 02/12/2024       ---  PMH/PSH/Medications/Allergies/Social history reviewed and as in chart.    Review of Systems   Constitutional:  Negative for chills and fever.   Respiratory:  Negative for shortness of breath.    Cardiovascular:  Negative for chest pain and palpitations.   Gastrointestinal:  Negative for abdominal pain, constipation and diarrhea.   Genitourinary:  Negative for difficulty urinating, dysuria, flank pain, frequency, hematuria, pelvic pain, urgency and vaginal pain.   Neurological:  Negative for dizziness and weakness.   Psychiatric/Behavioral:  Negative for agitation, confusion and sleep disturbance.        Objective:      Physical Exam  HENT:      Head: Normocephalic.   Pulmonary:      Effort: Pulmonary effort is normal.   Abdominal:      General: Abdomen is flat.      Palpations: Abdomen is soft.   Genitourinary:     Comments: Suprapubic catheter in place, 50cc of sterile water inserted into the SPT, balloon deflated and SPT removed without difficulty, cleaned SPT insertion site with betadine then used sterile technique to insert a new 16fr SPT, urine seen and balloon inflated with 10cc, irrigated SPT tube with 50cc of sterile water, olayinka back easily, connected to chino leg bag and secured SPT tube to upper right thigh.   Dressing to surgical site removed, surgical site clean without drainage or erythema.  Musculoskeletal:         General: Normal range of motion.      Cervical back: Normal range of motion.    Skin:     General: Skin is warm and dry.   Neurological:      Mental Status: She is alert and oriented to person, place, and time.         Assessment:     Problem Noted   Suprapubic Catheter 3/12/2024   S/P Urological Surgery 3/12/2024   Urinary Retention 4/12/2022    , 280 and on cystoscopy, has BL reflux   Cath placed 4/4/22  UDS 4/22:  No void, normal compliance bilateral grade 1 reflux  SPT placed 2/12/24         Plan:     Suprapubic catheter in place, 50cc of sterile water inserted into the SPT, balloon deflated and SPT removed without difficulty, cleaned SPT insertion site with betadine then used sterile technique to insert a new 16fr SPT, urine seen and balloon inflated with 10cc, irrigated SPT tube with 50cc of sterile water, olayinka back easily, connected to chino leg bag and secured SPT tube to upper right thigh.   Leave SPT area open to air.   Follow-up 1 month for SPT change.    HARJIT Tafoya    Time spent was 25 minutes. This includes face to face time and non-face to face time preparing to see the patient (eg, review of tests), obtaining and/or reviewing separately obtained history, documenting clinical information in the electronic or other health record, independently interpreting results and communicating results to the patient/family/caregiver, or care coordinator.

## 2024-04-12 ENCOUNTER — OFFICE VISIT (OUTPATIENT)
Dept: UROLOGY | Facility: CLINIC | Age: 74
End: 2024-04-12
Payer: MEDICARE

## 2024-04-12 ENCOUNTER — LAB VISIT (OUTPATIENT)
Dept: LAB | Facility: HOSPITAL | Age: 74
End: 2024-04-12
Payer: MEDICARE

## 2024-04-12 VITALS — SYSTOLIC BLOOD PRESSURE: 171 MMHG | HEART RATE: 61 BPM | DIASTOLIC BLOOD PRESSURE: 79 MMHG

## 2024-04-12 DIAGNOSIS — N18.31 STAGE 3A CHRONIC KIDNEY DISEASE: ICD-10-CM

## 2024-04-12 DIAGNOSIS — N25.81 SECONDARY HYPERPARATHYROIDISM OF RENAL ORIGIN: ICD-10-CM

## 2024-04-12 DIAGNOSIS — N18.31 ANEMIA IN STAGE 3A CHRONIC KIDNEY DISEASE: ICD-10-CM

## 2024-04-12 DIAGNOSIS — R33.9 URINARY RETENTION: ICD-10-CM

## 2024-04-12 DIAGNOSIS — D63.1 ANEMIA IN STAGE 3A CHRONIC KIDNEY DISEASE: ICD-10-CM

## 2024-04-12 DIAGNOSIS — Z93.59 SUPRAPUBIC CATHETER: Primary | ICD-10-CM

## 2024-04-12 PROBLEM — Z98.890 S/P UROLOGICAL SURGERY: Status: RESOLVED | Noted: 2024-03-12 | Resolved: 2024-04-12

## 2024-04-12 LAB
ALBUMIN SERPL BCP-MCNC: 3.5 G/DL (ref 3.5–5.2)
ANION GAP SERPL CALC-SCNC: 10 MMOL/L (ref 8–16)
BASOPHILS # BLD AUTO: 0.04 K/UL (ref 0–0.2)
BASOPHILS NFR BLD: 0.6 % (ref 0–1.9)
BUN SERPL-MCNC: 15 MG/DL (ref 8–23)
CALCIUM SERPL-MCNC: 9.7 MG/DL (ref 8.7–10.5)
CHLORIDE SERPL-SCNC: 107 MMOL/L (ref 95–110)
CO2 SERPL-SCNC: 25 MMOL/L (ref 23–29)
CREAT SERPL-MCNC: 0.8 MG/DL (ref 0.5–1.4)
DIFFERENTIAL METHOD BLD: ABNORMAL
EOSINOPHIL # BLD AUTO: 0.1 K/UL (ref 0–0.5)
EOSINOPHIL NFR BLD: 2 % (ref 0–8)
ERYTHROCYTE [DISTWIDTH] IN BLOOD BY AUTOMATED COUNT: 13.4 % (ref 11.5–14.5)
EST. GFR  (NO RACE VARIABLE): >60 ML/MIN/1.73 M^2
FERRITIN SERPL-MCNC: 67 NG/ML (ref 20–300)
GLUCOSE SERPL-MCNC: 89 MG/DL (ref 70–110)
HCT VFR BLD AUTO: 44.5 % (ref 37–48.5)
HGB BLD-MCNC: 14 G/DL (ref 12–16)
IMM GRANULOCYTES # BLD AUTO: 0.02 K/UL (ref 0–0.04)
IMM GRANULOCYTES NFR BLD AUTO: 0.3 % (ref 0–0.5)
IRON SERPL-MCNC: 86 UG/DL (ref 30–160)
LYMPHOCYTES # BLD AUTO: 1.3 K/UL (ref 1–4.8)
LYMPHOCYTES NFR BLD: 20 % (ref 18–48)
MAGNESIUM SERPL-MCNC: 2 MG/DL (ref 1.6–2.6)
MCH RBC QN AUTO: 30.4 PG (ref 27–31)
MCHC RBC AUTO-ENTMCNC: 31.5 G/DL (ref 32–36)
MCV RBC AUTO: 97 FL (ref 82–98)
MONOCYTES # BLD AUTO: 0.6 K/UL (ref 0.3–1)
MONOCYTES NFR BLD: 8.3 % (ref 4–15)
NEUTROPHILS # BLD AUTO: 4.6 K/UL (ref 1.8–7.7)
NEUTROPHILS NFR BLD: 68.8 % (ref 38–73)
NRBC BLD-RTO: 0 /100 WBC
PHOSPHATE SERPL-MCNC: 2.7 MG/DL (ref 2.7–4.5)
PLATELET # BLD AUTO: 181 K/UL (ref 150–450)
PMV BLD AUTO: 10.4 FL (ref 9.2–12.9)
POTASSIUM SERPL-SCNC: 4.2 MMOL/L (ref 3.5–5.1)
PTH-INTACT SERPL-MCNC: 70.6 PG/ML (ref 9–77)
RBC # BLD AUTO: 4.61 M/UL (ref 4–5.4)
SATURATED IRON: 24 % (ref 20–50)
SODIUM SERPL-SCNC: 142 MMOL/L (ref 136–145)
TOTAL IRON BINDING CAPACITY: 361 UG/DL (ref 250–450)
TRANSFERRIN SERPL-MCNC: 244 MG/DL (ref 200–375)
URATE SERPL-MCNC: 4.5 MG/DL (ref 2.4–5.7)
WBC # BLD AUTO: 6.66 K/UL (ref 3.9–12.7)

## 2024-04-12 PROCEDURE — 83970 ASSAY OF PARATHORMONE: CPT | Performed by: INTERNAL MEDICINE

## 2024-04-12 PROCEDURE — 83735 ASSAY OF MAGNESIUM: CPT | Performed by: INTERNAL MEDICINE

## 2024-04-12 PROCEDURE — 85025 COMPLETE CBC W/AUTO DIFF WBC: CPT | Performed by: INTERNAL MEDICINE

## 2024-04-12 PROCEDURE — 99212 OFFICE O/P EST SF 10 MIN: CPT | Mod: 25,S$GLB,,

## 2024-04-12 PROCEDURE — 1159F MED LIST DOCD IN RCRD: CPT | Mod: CPTII,S$GLB,,

## 2024-04-12 PROCEDURE — 4010F ACE/ARB THERAPY RXD/TAKEN: CPT | Mod: CPTII,S$GLB,,

## 2024-04-12 PROCEDURE — 84550 ASSAY OF BLOOD/URIC ACID: CPT | Performed by: INTERNAL MEDICINE

## 2024-04-12 PROCEDURE — 3077F SYST BP >= 140 MM HG: CPT | Mod: CPTII,S$GLB,,

## 2024-04-12 PROCEDURE — 51705 CHANGE OF BLADDER TUBE: CPT | Mod: S$GLB,,,

## 2024-04-12 PROCEDURE — 1126F AMNT PAIN NOTED NONE PRSNT: CPT | Mod: CPTII,S$GLB,,

## 2024-04-12 PROCEDURE — 36415 COLL VENOUS BLD VENIPUNCTURE: CPT | Performed by: INTERNAL MEDICINE

## 2024-04-12 PROCEDURE — 82728 ASSAY OF FERRITIN: CPT | Performed by: INTERNAL MEDICINE

## 2024-04-12 PROCEDURE — 99999 PR PBB SHADOW E&M-EST. PATIENT-LVL III: CPT | Mod: PBBFAC,,,

## 2024-04-12 PROCEDURE — 1160F RVW MEDS BY RX/DR IN RCRD: CPT | Mod: CPTII,S$GLB,,

## 2024-04-12 PROCEDURE — 83540 ASSAY OF IRON: CPT | Performed by: INTERNAL MEDICINE

## 2024-04-12 PROCEDURE — 3078F DIAST BP <80 MM HG: CPT | Mod: CPTII,S$GLB,,

## 2024-04-12 PROCEDURE — 80069 RENAL FUNCTION PANEL: CPT | Performed by: INTERNAL MEDICINE

## 2024-04-12 NOTE — PROGRESS NOTES
Subjective:       Patient ID: Socorro Lucas is a 73 y.o. female.    Chief Complaint: SPT change     This is a 73 y.o.  female patient that is new to me but not new to the system.  This is a patient of Dr. Riley who had a SPT placed on 2/12/24. Patient has a history of recurrent UTIs, nephrolithiasis, and urinary retention requiring a chronic chino catheter. Patient is here today for SPT change. No other complaints today.       Lab Results   Component Value Date    CREATININE 1.0 02/12/2024       ---  PMH/PSH/Medications/Allergies/Social history reviewed and as in chart.    Review of Systems   Constitutional:  Negative for chills and fever.   Respiratory:  Negative for shortness of breath.    Cardiovascular:  Negative for chest pain and palpitations.   Gastrointestinal:  Negative for abdominal pain, constipation and diarrhea.   Genitourinary:  Negative for difficulty urinating, dysuria, flank pain, frequency, hematuria, pelvic pain, urgency and vaginal pain.   Neurological:  Negative for dizziness and weakness.   Psychiatric/Behavioral:  Negative for agitation, confusion and sleep disturbance.        Objective:      Physical Exam  HENT:      Head: Normocephalic.   Pulmonary:      Effort: Pulmonary effort is normal.   Abdominal:      General: Abdomen is flat.      Palpations: Abdomen is soft.   Genitourinary:     Comments: Suprapubic catheter in place, 50cc of sterile water inserted into the SPT, balloon deflated and SPT removed without difficulty, cleaned SPT insertion site with betadine then used sterile technique to insert a new 16fr SPT, urine seen and balloon inflated with 10cc, irrigated SPT tube with 50cc of sterile water, olayinka back easily, connected to chino leg bag and secured SPT tube to upper right thigh.   Dressing to surgical site removed, surgical site clean without drainage or erythema.  Musculoskeletal:         General: Normal range of motion.      Cervical back: Normal range of motion.    Skin:     General: Skin is warm and dry.   Neurological:      Mental Status: She is alert and oriented to person, place, and time.         Assessment:     Problem Noted   Suprapubic Catheter 3/12/2024   Urinary Retention 4/12/2022    , 280 and on cystoscopy, has BL reflux   Cath placed 4/4/22  UDS 4/22:  No void, normal compliance bilateral grade 1 reflux  SPT placed 2/12/24     S/P Urological Surgery (Resolved) 3/12/2024       Plan:     Suprapubic catheter in place, 50cc of sterile water inserted into the SPT, balloon deflated and SPT removed without difficulty, cleaned SPT insertion site with betadine then used sterile technique to insert a new 16fr SPT, urine seen and balloon inflated with 10cc, irrigated SPT tube with 50cc of sterile water, olayinka back easily, connected to chino leg bag and secured SPT tube to upper right thigh.   Leave SPT area open to air, cleanse the are with non-scented soap daily, drink plenty of water daily.  Follow-up 1 month for SPT change.    HARJIT Tafoya    Time spent was 25 minutes. This includes face to face time and non-face to face time preparing to see the patient (eg, review of tests), obtaining and/or reviewing separately obtained history, documenting clinical information in the electronic or other health record, independently interpreting results and communicating results to the patient/family/caregiver, or care coordinator.

## 2024-05-10 ENCOUNTER — OFFICE VISIT (OUTPATIENT)
Dept: PODIATRY | Facility: CLINIC | Age: 74
End: 2024-05-10
Payer: MEDICARE

## 2024-05-10 VITALS — BODY MASS INDEX: 31.92 KG/M2 | HEIGHT: 61 IN | WEIGHT: 169.06 LBS

## 2024-05-10 DIAGNOSIS — L60.9 DISEASE OF NAIL: Primary | ICD-10-CM

## 2024-05-10 DIAGNOSIS — R09.89 DIMINISHED PULSE: ICD-10-CM

## 2024-05-10 PROCEDURE — 3008F BODY MASS INDEX DOCD: CPT | Mod: CPTII,S$GLB,, | Performed by: STUDENT IN AN ORGANIZED HEALTH CARE EDUCATION/TRAINING PROGRAM

## 2024-05-10 PROCEDURE — 1125F AMNT PAIN NOTED PAIN PRSNT: CPT | Mod: CPTII,S$GLB,, | Performed by: STUDENT IN AN ORGANIZED HEALTH CARE EDUCATION/TRAINING PROGRAM

## 2024-05-10 PROCEDURE — 99203 OFFICE O/P NEW LOW 30 MIN: CPT | Mod: S$GLB,,, | Performed by: STUDENT IN AN ORGANIZED HEALTH CARE EDUCATION/TRAINING PROGRAM

## 2024-05-10 PROCEDURE — 4010F ACE/ARB THERAPY RXD/TAKEN: CPT | Mod: CPTII,S$GLB,, | Performed by: STUDENT IN AN ORGANIZED HEALTH CARE EDUCATION/TRAINING PROGRAM

## 2024-05-10 PROCEDURE — 1101F PT FALLS ASSESS-DOCD LE1/YR: CPT | Mod: CPTII,S$GLB,, | Performed by: STUDENT IN AN ORGANIZED HEALTH CARE EDUCATION/TRAINING PROGRAM

## 2024-05-10 PROCEDURE — 3288F FALL RISK ASSESSMENT DOCD: CPT | Mod: CPTII,S$GLB,, | Performed by: STUDENT IN AN ORGANIZED HEALTH CARE EDUCATION/TRAINING PROGRAM

## 2024-05-10 PROCEDURE — 99999 PR PBB SHADOW E&M-EST. PATIENT-LVL II: CPT | Mod: PBBFAC,,, | Performed by: STUDENT IN AN ORGANIZED HEALTH CARE EDUCATION/TRAINING PROGRAM

## 2024-05-10 NOTE — PROGRESS NOTES
Subjective:     Patient ID: Socorro Lucas is a 73 y.o. female.    Chief Complaint: Nail Care (Both feet nails are need to be trimmed growing in different directions throbbing sensation)    Socorro is a 73 y.o. female who presents to the clinic complaining of painful ingrown toenail on both feet left hallux and 2nd digit and right 2nd digit toenails.    Review of Systems   Constitutional: Negative for chills, decreased appetite, diaphoresis and fever.   HENT:  Negative for congestion and hearing loss.    Cardiovascular:  Negative for chest pain, claudication, leg swelling and syncope.   Respiratory:  Negative for cough and shortness of breath.    Skin:  Positive for dry skin and nail changes. Negative for color change, flushing, itching, poor wound healing and rash.   Musculoskeletal:  Negative for joint pain and joint swelling.   Gastrointestinal:  Negative for nausea and vomiting.   Neurological:  Negative for focal weakness, numbness, paresthesias and weakness.   Psychiatric/Behavioral:  Negative for altered mental status. The patient is not nervous/anxious.         Objective:     Physical Exam  Constitutional:       General: She is not in acute distress.     Appearance: She is well-developed. She is not diaphoretic.   Cardiovascular:      Comments: Dorsalis pedis and posterior tibial pulses are mildly diminished.Skin temperature is within normal limits. Toes are cool to touch and feet are warm proximally. Hair growth is diminished. Skin is mildly atrophic and with mild hyperpigmentation. Mild edema noted, bilaterally. Telangiectasias bilaterally.  Musculoskeletal:         General: No tenderness.      Comments: Adequate joint range of motion without pain, limitation, nor crepitation to bilateral feet and ankle joints. Muscle strength is 5/5 in all groups bilaterally.       Lymphadenopathy:      Comments: Negative lymphangitic streaking    Skin:     General: Skin is warm and dry.      Findings: No lesion.       Comments: Skin is warm and dry, no acute signs of infection noted. No open wounds, macerations or hyperkeratotic lesions, bilaterally.       Bilatearl 2nd and left hallux toenails are thickened by 2-4 mm's, dystrophic, and are darkened in coloration with subungual fungal debris, bilaterally.  Remainder of toenails are of normal morphology. Skin is very dry, bilaterally.      Neurological:      Mental Status: She is alert and oriented to person, place, and time.      Sensory: No sensory deficit.      Motor: No abnormal muscle tone.      Comments: Light touch within normal limits.    Psychiatric:         Behavior: Behavior normal.         Thought Content: Thought content normal.         Judgment: Judgment normal.           Assessment:      Encounter Diagnoses   Name Primary?    Disease of nail Yes    Diminished pulse      Plan:     Socorro was seen today for nail care.    Diagnoses and all orders for this visit:    Disease of nail    Diminished pulse      I counseled the patient on her conditions, their implications and medical management.  Utilizing sterile toenail clippers I aggressively trimmed  the offending above mentioned  nail border approximately 3 mm from its edge and carried the nail plate incision down at an angle in order to wedge out the offending cryptotic portion of the nail plate. The offending border was then removed in toto. No blood was drawn. Patient tolerated the procedure well and related significant relief.   Previous arterial us reviewed, no evidence of stenosis  Recommend OTC tx for thickened toenails  Return to clinic in 6-12 mo, sooner PRN

## 2024-05-17 ENCOUNTER — OFFICE VISIT (OUTPATIENT)
Dept: UROLOGY | Facility: CLINIC | Age: 74
End: 2024-05-17
Payer: MEDICARE

## 2024-05-17 VITALS — HEART RATE: 65 BPM | SYSTOLIC BLOOD PRESSURE: 154 MMHG | DIASTOLIC BLOOD PRESSURE: 82 MMHG

## 2024-05-17 DIAGNOSIS — R33.9 URINARY RETENTION: ICD-10-CM

## 2024-05-17 DIAGNOSIS — Z93.59 SUPRAPUBIC CATHETER: Primary | ICD-10-CM

## 2024-05-17 PROCEDURE — 1160F RVW MEDS BY RX/DR IN RCRD: CPT | Mod: CPTII,S$GLB,,

## 2024-05-17 PROCEDURE — 1159F MED LIST DOCD IN RCRD: CPT | Mod: CPTII,S$GLB,,

## 2024-05-17 PROCEDURE — 1126F AMNT PAIN NOTED NONE PRSNT: CPT | Mod: CPTII,S$GLB,,

## 2024-05-17 PROCEDURE — 99999 PR PBB SHADOW E&M-EST. PATIENT-LVL III: CPT | Mod: PBBFAC,,,

## 2024-05-17 PROCEDURE — 99212 OFFICE O/P EST SF 10 MIN: CPT | Mod: S$GLB,,,

## 2024-05-17 PROCEDURE — 4010F ACE/ARB THERAPY RXD/TAKEN: CPT | Mod: CPTII,S$GLB,,

## 2024-05-17 PROCEDURE — 3077F SYST BP >= 140 MM HG: CPT | Mod: CPTII,S$GLB,,

## 2024-05-17 PROCEDURE — 3079F DIAST BP 80-89 MM HG: CPT | Mod: CPTII,S$GLB,,

## 2024-05-17 NOTE — PROGRESS NOTES
Subjective:       Patient ID: Socorro Lucas is a 73 y.o. female.    Chief Complaint: SPT change     This is a 73 y.o.  female patient that is new to me but not new to the system.  This is a patient of Dr. Riley who had a SPT placed on 2/12/24. Patient has a history of recurrent UTIs, nephrolithiasis, and urinary retention requiring a chronic chino catheter. Patient is here today for SPT change. Reports that after the last SPT change she had some hematuria but it resolved within an hour after the SPT change. Endorses no other complaints today.    Lab Results   Component Value Date    CREATININE 0.8 04/12/2024       ---  PMH/PSH/Medications/Allergies/Social history reviewed and as in chart.    Review of Systems   Constitutional:  Negative for chills and fever.   Respiratory:  Negative for shortness of breath.    Cardiovascular:  Negative for chest pain and palpitations.   Gastrointestinal:  Negative for abdominal pain, constipation and diarrhea.   Genitourinary:  Positive for difficulty urinating (SPT). Negative for dysuria, flank pain, frequency, hematuria, pelvic pain, urgency and vaginal pain.   Neurological:  Negative for dizziness and weakness.   Psychiatric/Behavioral:  Negative for agitation, confusion and sleep disturbance.        Objective:      Physical Exam  HENT:      Head: Normocephalic.   Pulmonary:      Effort: Pulmonary effort is normal.   Abdominal:      General: Abdomen is flat.      Palpations: Abdomen is soft.   Genitourinary:     Comments: Suprapubic catheter in place, 50cc of sterile water inserted into the SPT, balloon deflated and SPT removed without difficulty, cleaned SPT insertion site with betadine then used sterile technique to insert a new 16fr SPT, urine seen and balloon inflated with 10cc, irrigated SPT tube with 50cc of sterile water, olayinka back easily but light pink in color, irrigated another 50cc of sterile water and dew back clear yellow urine without difficulty, connected to  chino leg bag and secured SPT tube to upper right thigh.     Musculoskeletal:         General: Normal range of motion.      Cervical back: Normal range of motion.   Skin:     General: Skin is warm and dry.   Neurological:      Mental Status: She is alert and oriented to person, place, and time.       Assessment:     Problem Noted   Suprapubic Catheter 3/12/2024   Urinary Retention 4/12/2022    , 280 and on cystoscopy, has BL reflux   Cath placed 4/4/22  UDS 4/22:  No void, normal compliance bilateral grade 1 reflux  SPT placed 2/12/24     S/P Urological Surgery (Resolved) 3/12/2024       Plan:     Suprapubic catheter in place, 50cc of sterile water inserted into the SPT, balloon deflated and SPT removed without difficulty, cleaned SPT insertion site with betadine then used sterile technique to insert a new 16fr SPT, urine seen and balloon inflated with 10cc, irrigated SPT tube with 50cc of sterile water, olaynika back easily but light pink in color, irrigated another 50cc of sterile water and dew back clear yellow urine without difficulty, connected to chino leg bag and secured SPT tube to upper right thigh.   Increase water intake, notify clinic if hematuria that will not go away or decreased drainage of urine. Patient verbalized understanding.   Follow-up 1 month for SPT change.    HARJIT Tafoya    Time spent was 25 minutes. This includes face to face time and non-face to face time preparing to see the patient (eg, review of tests), obtaining and/or reviewing separately obtained history, documenting clinical information in the electronic or other health record, independently interpreting results and communicating results to the patient/family/caregiver, or care coordinator.

## 2024-05-27 PROBLEM — E66.01 MORBID (SEVERE) OBESITY DUE TO EXCESS CALORIES: Status: ACTIVE | Noted: 2022-08-18

## 2024-05-27 PROBLEM — E11.22 CHRONIC KIDNEY DISEASE DUE TO TYPE 2 DIABETES MELLITUS: Status: ACTIVE | Noted: 2024-05-27

## 2024-05-27 PROBLEM — I50.40 COMBINED SYSTOLIC AND DIASTOLIC HEART FAILURE, UNSPECIFIED HF CHRONICITY: Status: ACTIVE | Noted: 2024-05-27

## 2024-05-27 PROBLEM — N18.31 STAGE 3A CHRONIC KIDNEY DISEASE: Status: ACTIVE | Noted: 2024-05-27

## 2024-06-17 ENCOUNTER — OFFICE VISIT (OUTPATIENT)
Dept: UROLOGY | Facility: CLINIC | Age: 74
End: 2024-06-17
Payer: MEDICARE

## 2024-06-17 VITALS — SYSTOLIC BLOOD PRESSURE: 150 MMHG | DIASTOLIC BLOOD PRESSURE: 76 MMHG | HEART RATE: 65 BPM

## 2024-06-17 DIAGNOSIS — Z93.59 SUPRAPUBIC CATHETER: Primary | ICD-10-CM

## 2024-06-17 PROCEDURE — 3066F NEPHROPATHY DOC TX: CPT | Mod: CPTII,S$GLB,,

## 2024-06-17 PROCEDURE — 99999 PR PBB SHADOW E&M-EST. PATIENT-LVL III: CPT | Mod: PBBFAC,,,

## 2024-06-17 PROCEDURE — 99212 OFFICE O/P EST SF 10 MIN: CPT | Mod: S$GLB,,,

## 2024-06-17 PROCEDURE — 1159F MED LIST DOCD IN RCRD: CPT | Mod: CPTII,S$GLB,,

## 2024-06-17 PROCEDURE — 1160F RVW MEDS BY RX/DR IN RCRD: CPT | Mod: CPTII,S$GLB,,

## 2024-06-17 PROCEDURE — 3078F DIAST BP <80 MM HG: CPT | Mod: CPTII,S$GLB,,

## 2024-06-17 PROCEDURE — 4010F ACE/ARB THERAPY RXD/TAKEN: CPT | Mod: CPTII,S$GLB,,

## 2024-06-17 PROCEDURE — 1126F AMNT PAIN NOTED NONE PRSNT: CPT | Mod: CPTII,S$GLB,,

## 2024-06-17 PROCEDURE — 3077F SYST BP >= 140 MM HG: CPT | Mod: CPTII,S$GLB,,

## 2024-06-17 NOTE — PROGRESS NOTES
Subjective:       Patient ID: Socorro Lucas is a 73 y.o. female.    Chief Complaint: SPT change     This is a 73 y.o.  female patient that is new to me but not new to the system.  This is a patient of Dr. Riley who had a SPT placed on 2/12/24. Patient has a history of recurrent UTIs, nephrolithiasis, and urinary retention requiring a chronic chino catheter. Patient is here today for SPT change. Endorses no other complaints today.    Lab Results   Component Value Date    CREATININE 0.8 04/12/2024       ---  PMH/PSH/Medications/Allergies/Social history reviewed and as in chart.    Review of Systems   Constitutional:  Negative for chills and fever.   Respiratory:  Negative for shortness of breath.    Cardiovascular:  Negative for chest pain and palpitations.   Gastrointestinal:  Negative for abdominal pain, constipation and diarrhea.   Genitourinary:  Positive for difficulty urinating (SPT). Negative for dysuria, flank pain, frequency, hematuria, pelvic pain, urgency and vaginal pain.   Neurological:  Negative for dizziness and weakness.   Psychiatric/Behavioral:  Negative for agitation, confusion and sleep disturbance.        Objective:      Physical Exam  HENT:      Head: Normocephalic.   Pulmonary:      Effort: Pulmonary effort is normal.   Abdominal:      General: Abdomen is flat.      Palpations: Abdomen is soft.   Genitourinary:     Comments: Suprapubic catheter in place, 50cc of sterile water inserted into the SPT, balloon deflated and SPT removed without difficulty, cleaned SPT insertion site with betadine then used sterile technique to insert a new 16fr SPT, urine seen and balloon inflated with 10cc, irrigated SPT tube with 50cc of sterile water, olayinka back easily but light pink in color, irrigated another 50cc of sterile water and dew back clear yellow urine without difficulty, connected to chino leg bag and secured SPT tube to upper right thigh.     Musculoskeletal:         General: Normal range of  motion.      Cervical back: Normal range of motion.   Skin:     General: Skin is warm and dry.   Neurological:      Mental Status: She is alert and oriented to person, place, and time.         Assessment:     Problem Noted   Suprapubic Catheter 3/12/2024    SPT changed 6/17/24     Urinary Retention 4/12/2022    , 280 and on cystoscopy, has BL reflux   Cath placed 4/4/22  UDS 4/22:  No void, normal compliance bilateral grade 1 reflux  SPT placed 2/12/24     S/P Urological Surgery (Resolved) 3/12/2024       Plan:     Suprapubic catheter in place, 50cc of sterile water inserted into the SPT, balloon deflated and SPT removed without difficulty, cleaned SPT insertion site with betadine then used sterile technique to insert a new 16fr SPT, urine seen and balloon inflated with 10cc, irrigated SPT tube with 50cc of sterile water, olayinka back easily but light pink in color, irrigated another 50cc of sterile water and dew back clear yellow urine without difficulty, connected to chino leg bag and secured SPT tube to upper right thigh.   Follow-up 1 month for SPT change.    HARJTI Tafoya    Time spent was 25 minutes. This includes face to face time and non-face to face time preparing to see the patient (eg, review of tests), obtaining and/or reviewing separately obtained history, documenting clinical information in the electronic or other health record, independently interpreting results and communicating results to the patient/family/caregiver, or care coordinator.

## 2024-07-17 ENCOUNTER — OFFICE VISIT (OUTPATIENT)
Dept: UROLOGY | Facility: CLINIC | Age: 74
End: 2024-07-17
Payer: MEDICARE

## 2024-07-17 VITALS
DIASTOLIC BLOOD PRESSURE: 84 MMHG | SYSTOLIC BLOOD PRESSURE: 156 MMHG | HEIGHT: 61 IN | WEIGHT: 177.38 LBS | HEART RATE: 69 BPM | BODY MASS INDEX: 33.49 KG/M2

## 2024-07-17 DIAGNOSIS — Z93.59 SUPRAPUBIC CATHETER: Primary | ICD-10-CM

## 2024-07-17 PROCEDURE — 3066F NEPHROPATHY DOC TX: CPT | Mod: CPTII,S$GLB,,

## 2024-07-17 PROCEDURE — 1125F AMNT PAIN NOTED PAIN PRSNT: CPT | Mod: CPTII,S$GLB,,

## 2024-07-17 PROCEDURE — 1160F RVW MEDS BY RX/DR IN RCRD: CPT | Mod: CPTII,S$GLB,,

## 2024-07-17 PROCEDURE — 99212 OFFICE O/P EST SF 10 MIN: CPT | Mod: S$GLB,,,

## 2024-07-17 PROCEDURE — 1159F MED LIST DOCD IN RCRD: CPT | Mod: CPTII,S$GLB,,

## 2024-07-17 PROCEDURE — 3008F BODY MASS INDEX DOCD: CPT | Mod: CPTII,S$GLB,,

## 2024-07-17 PROCEDURE — 3077F SYST BP >= 140 MM HG: CPT | Mod: CPTII,S$GLB,,

## 2024-07-17 PROCEDURE — 3079F DIAST BP 80-89 MM HG: CPT | Mod: CPTII,S$GLB,,

## 2024-07-17 PROCEDURE — 4010F ACE/ARB THERAPY RXD/TAKEN: CPT | Mod: CPTII,S$GLB,,

## 2024-07-17 PROCEDURE — 99999 PR PBB SHADOW E&M-EST. PATIENT-LVL III: CPT | Mod: PBBFAC,,,

## 2024-07-17 NOTE — PROGRESS NOTES
Subjective:       Patient ID: Socorro Lucas is a 73 y.o. female.    Chief Complaint: SPT change     This is a 73 y.o.  female patient that is new to me but not new to the system.  This is a patient of Dr. Riley who had a SPT placed on 2/12/24. Patient has a history of recurrent UTIs, nephrolithiasis, and urinary retention requiring a chronic chino catheter. Patient is here today for SPT change. Endorses no other complaints today.    Lab Results   Component Value Date    CREATININE 0.8 04/12/2024       ---  PMH/PSH/Medications/Allergies/Social history reviewed and as in chart.    Review of Systems   Constitutional:  Negative for chills and fever.   Respiratory:  Negative for shortness of breath.    Cardiovascular:  Negative for chest pain and palpitations.   Gastrointestinal:  Negative for abdominal pain, constipation and diarrhea.   Genitourinary:  Positive for difficulty urinating (SPT). Negative for dysuria, flank pain, frequency, hematuria, pelvic pain, urgency and vaginal pain.   Neurological:  Negative for dizziness and weakness.   Psychiatric/Behavioral:  Negative for agitation, confusion and sleep disturbance.        Objective:      Physical Exam  HENT:      Head: Normocephalic.   Pulmonary:      Effort: Pulmonary effort is normal.   Abdominal:      General: Abdomen is flat.      Palpations: Abdomen is soft.   Genitourinary:     Comments: Suprapubic catheter in place, 50cc of sterile water inserted into the SPT, balloon deflated and SPT removed without difficulty, cleaned SPT insertion site with betadine then used sterile technique to insert a new 16fr SPT, urine seen and balloon inflated with 10cc, irrigated SPT tube with 50cc of sterile water, olayinka back easily but light pink in color, irrigated another 50cc of sterile water and dew back clear yellow urine without difficulty, connected to chino leg bag and secured SPT tube to upper right thigh.     Musculoskeletal:         General: Normal range of  motion.      Cervical back: Normal range of motion.   Skin:     General: Skin is warm and dry.   Neurological:      Mental Status: She is alert and oriented to person, place, and time.         Assessment:     Problem Noted   Suprapubic Catheter 3/12/2024    SPT changed 6/17/24     Urinary Retention 4/12/2022    , 280 and on cystoscopy, has BL reflux   Cath placed 4/4/22  UDS 4/22:  No void, normal compliance bilateral grade 1 reflux  SPT placed 2/12/24     S/P Urological Surgery (Resolved) 3/12/2024       Plan:     Suprapubic catheter in place, 50cc of sterile water inserted into the SPT, balloon deflated and SPT removed without difficulty, cleaned SPT insertion site with betadine then used sterile technique to insert a new 16fr SPT, urine seen and balloon inflated with 10cc, irrigated SPT tube with 50cc of sterile water, olayinka back easily but light pink in color, irrigated another 50cc of sterile water and dew back clear yellow urine without difficulty, connected to chino leg bag and secured SPT tube to upper right thigh.   Follow-up 1 month for SPT change.    HARJIT Tafoya    Time spent was 25 minutes. This includes face to face time and non-face to face time preparing to see the patient (eg, review of tests), obtaining and/or reviewing separately obtained history, documenting clinical information in the electronic or other health record, independently interpreting results and communicating results to the patient/family/caregiver, or care coordinator.

## 2024-08-19 ENCOUNTER — OFFICE VISIT (OUTPATIENT)
Dept: UROLOGY | Facility: CLINIC | Age: 74
End: 2024-08-19
Payer: MEDICARE

## 2024-08-19 VITALS
DIASTOLIC BLOOD PRESSURE: 79 MMHG | HEIGHT: 61 IN | HEART RATE: 82 BPM | SYSTOLIC BLOOD PRESSURE: 158 MMHG | BODY MASS INDEX: 33.51 KG/M2

## 2024-08-19 DIAGNOSIS — Z43.5 ENCOUNTER FOR SUPRAPUBIC CATHETER CARE: Primary | ICD-10-CM

## 2024-08-19 PROCEDURE — 1126F AMNT PAIN NOTED NONE PRSNT: CPT | Mod: CPTII,S$GLB,,

## 2024-08-19 PROCEDURE — 4010F ACE/ARB THERAPY RXD/TAKEN: CPT | Mod: CPTII,S$GLB,,

## 2024-08-19 PROCEDURE — 99999 PR PBB SHADOW E&M-EST. PATIENT-LVL III: CPT | Mod: PBBFAC,,,

## 2024-08-19 PROCEDURE — 1160F RVW MEDS BY RX/DR IN RCRD: CPT | Mod: CPTII,S$GLB,,

## 2024-08-19 PROCEDURE — 3008F BODY MASS INDEX DOCD: CPT | Mod: CPTII,S$GLB,,

## 2024-08-19 PROCEDURE — 3066F NEPHROPATHY DOC TX: CPT | Mod: CPTII,S$GLB,,

## 2024-08-19 PROCEDURE — 3078F DIAST BP <80 MM HG: CPT | Mod: CPTII,S$GLB,,

## 2024-08-19 PROCEDURE — 3077F SYST BP >= 140 MM HG: CPT | Mod: CPTII,S$GLB,,

## 2024-08-19 PROCEDURE — 1159F MED LIST DOCD IN RCRD: CPT | Mod: CPTII,S$GLB,,

## 2024-08-19 PROCEDURE — 99212 OFFICE O/P EST SF 10 MIN: CPT | Mod: S$GLB,,,

## 2024-08-19 NOTE — PROGRESS NOTES
Subjective:       Patient ID: Socorro Lucas is a 73 y.o. female.    Chief Complaint: SPT change     This is a 73 y.o.  female patient that is new to me but not new to the system.  This is a patient of Dr. Riley who had a SPT placed on 2/12/24. Patient has a history of recurrent UTIs, nephrolithiasis, and urinary retention requiring a chronic chino catheter. Patient is here today for SPT change. Endorses no other complaints today.    Lab Results   Component Value Date    CREATININE 0.8 04/12/2024       ---  PMH/PSH/Medications/Allergies/Social history reviewed and as in chart.    Review of Systems   Constitutional:  Negative for chills and fever.   Respiratory:  Negative for shortness of breath.    Cardiovascular:  Negative for chest pain and palpitations.   Gastrointestinal:  Negative for abdominal pain, constipation and diarrhea.   Genitourinary:  Positive for difficulty urinating (SPT). Negative for dysuria, flank pain, frequency, hematuria, pelvic pain, urgency and vaginal pain.   Neurological:  Negative for dizziness and weakness.   Psychiatric/Behavioral:  Negative for agitation, confusion and sleep disturbance.        Objective:      Physical Exam  HENT:      Head: Normocephalic.   Pulmonary:      Effort: Pulmonary effort is normal.   Abdominal:      General: Abdomen is flat.      Palpations: Abdomen is soft.   Genitourinary:     Comments: Suprapubic catheter in place, 50cc of sterile water inserted into the SPT, balloon deflated and SPT removed without difficulty, cleaned SPT insertion site with betadine then used sterile technique to insert a new 16fr SPT, urine seen and balloon inflated with 10cc, irrigated SPT tube with 50cc of sterile water, olayinka back easily but light pink in color, irrigated another 50cc of sterile water and dew back clear yellow urine without difficulty, connected to chino leg bag and secured SPT tube to upper right thigh.     Musculoskeletal:         General: Normal range of  motion.      Cervical back: Normal range of motion.   Skin:     General: Skin is warm and dry.   Neurological:      Mental Status: She is alert and oriented to person, place, and time.         Assessment:     Problem Noted   Encounter for Suprapubic Catheter Care 3/12/2024    SPT changed 6/17/24     Urinary Retention 4/12/2022    , 280 and on cystoscopy, has BL reflux   Cath placed 4/4/22  UDS 4/22:  No void, normal compliance bilateral grade 1 reflux  SPT placed 2/12/24     S/P Urological Surgery (Resolved) 3/12/2024       Plan:     Suprapubic catheter in place, 50cc of sterile water inserted into the SPT, balloon deflated and SPT removed without difficulty, cleaned SPT insertion site with betadine then used sterile technique to insert a new 16fr SPT, urine seen and balloon inflated with 10cc, irrigated SPT tube with 50cc of sterile water, olayinka back easily but light pink in color, irrigated another 50cc of sterile water and dew back clear yellow urine without difficulty, connected to chnio leg bag and secured SPT tube to upper right thigh.   Follow-up 1 month for SPT change.    HARJIT Tafoya    Time spent was 25 minutes. This includes face to face time and non-face to face time preparing to see the patient (eg, review of tests), obtaining and/or reviewing separately obtained history, documenting clinical information in the electronic or other health record, independently interpreting results and communicating results to the patient/family/caregiver, or care coordinator.

## 2024-09-19 ENCOUNTER — OFFICE VISIT (OUTPATIENT)
Dept: UROLOGY | Facility: CLINIC | Age: 74
End: 2024-09-19
Payer: MEDICARE

## 2024-09-19 VITALS
DIASTOLIC BLOOD PRESSURE: 77 MMHG | HEART RATE: 71 BPM | SYSTOLIC BLOOD PRESSURE: 131 MMHG | BODY MASS INDEX: 33.51 KG/M2 | HEIGHT: 61 IN

## 2024-09-19 DIAGNOSIS — Z43.5 ENCOUNTER FOR SUPRAPUBIC CATHETER CARE: Primary | ICD-10-CM

## 2024-09-19 PROCEDURE — 4010F ACE/ARB THERAPY RXD/TAKEN: CPT | Mod: CPTII,S$GLB,,

## 2024-09-19 PROCEDURE — 3008F BODY MASS INDEX DOCD: CPT | Mod: CPTII,S$GLB,,

## 2024-09-19 PROCEDURE — 3075F SYST BP GE 130 - 139MM HG: CPT | Mod: CPTII,S$GLB,,

## 2024-09-19 PROCEDURE — 1126F AMNT PAIN NOTED NONE PRSNT: CPT | Mod: CPTII,S$GLB,,

## 2024-09-19 PROCEDURE — 99999 PR PBB SHADOW E&M-EST. PATIENT-LVL III: CPT | Mod: PBBFAC,,,

## 2024-09-19 PROCEDURE — 1159F MED LIST DOCD IN RCRD: CPT | Mod: CPTII,S$GLB,,

## 2024-09-19 PROCEDURE — 99212 OFFICE O/P EST SF 10 MIN: CPT | Mod: S$GLB,,,

## 2024-09-19 PROCEDURE — 1160F RVW MEDS BY RX/DR IN RCRD: CPT | Mod: CPTII,S$GLB,,

## 2024-09-19 PROCEDURE — 3066F NEPHROPATHY DOC TX: CPT | Mod: CPTII,S$GLB,,

## 2024-09-19 PROCEDURE — 3078F DIAST BP <80 MM HG: CPT | Mod: CPTII,S$GLB,,

## 2024-09-19 NOTE — PROGRESS NOTES
Subjective:       Patient ID: Socorro Lucas is a 73 y.o. female.    Chief Complaint: SPT change     This is a 73 y.o.  female patient that is an established patient to the clinic. This is a patient of Dr. Riley who had a SPT placed on 2/12/24. Patient has a history of recurrent UTIs, nephrolithiasis, and urinary retention requiring a chronic chino catheter. Patient is here today for SPT change. Endorses no other complaints today.    Lab Results   Component Value Date    CREATININE 0.8 04/12/2024       ---  PMH/PSH/Medications/Allergies/Social history reviewed and as in chart.    Review of Systems   Constitutional:  Negative for chills and fever.   Respiratory:  Negative for shortness of breath.    Cardiovascular:  Negative for chest pain and palpitations.   Gastrointestinal:  Negative for abdominal pain, constipation and diarrhea.   Genitourinary:  Positive for difficulty urinating (SPT). Negative for dysuria, flank pain, frequency, hematuria, pelvic pain, urgency and vaginal pain.   Neurological:  Negative for dizziness and weakness.   Psychiatric/Behavioral:  Negative for agitation, confusion and sleep disturbance.        Objective:      Physical Exam  HENT:      Head: Normocephalic.   Pulmonary:      Effort: Pulmonary effort is normal.   Abdominal:      General: Abdomen is flat.      Palpations: Abdomen is soft.   Genitourinary:     Comments: Suprapubic catheter in place, 50cc of sterile water inserted into the SPT, balloon deflated and SPT removed without difficulty, cleaned SPT insertion site with betadine then used sterile technique to insert a new 16fr SPT, urine seen and balloon inflated with 10cc, irrigated SPT tube with 50cc of sterile water, olayinka back easily but light pink in color, irrigated another 50cc of sterile water and dew back clear yellow urine without difficulty, connected to chino leg bag and secured SPT tube to upper right thigh.     Musculoskeletal:         General: Normal range of  motion.      Cervical back: Normal range of motion.   Skin:     General: Skin is warm and dry.   Neurological:      Mental Status: She is alert and oriented to person, place, and time.         Assessment:     Problem Noted   Encounter for Suprapubic Catheter Care 3/12/2024    SPT changed 6/17/24     Urinary Retention 4/12/2022    , 280 and on cystoscopy, has BL reflux   Cath placed 4/4/22  UDS 4/22:  No void, normal compliance bilateral grade 1 reflux  SPT placed 2/12/24     S/P Urological Surgery (Resolved) 3/12/2024       Plan:     Suprapubic catheter in place, 50cc of sterile water inserted into the SPT, balloon deflated and SPT removed without difficulty, cleaned SPT insertion site with betadine then used sterile technique to insert a new 16fr SPT, urine seen and balloon inflated with 10cc, irrigated SPT tube with 50cc of sterile water, olayinka back easily but light pink in color, irrigated another 50cc of sterile water and dew back clear yellow urine without difficulty, connected to chino leg bag and secured SPT tube to upper right thigh.   Follow-up 1 month for SPT change.    HARJIT Tafoya    Time spent was 20 minutes. This includes face to face time and non-face to face time preparing to see the patient (eg, review of tests), obtaining and/or reviewing separately obtained history, documenting clinical information in the electronic or other health record, independently interpreting results and communicating results to the patient/family/caregiver, or care coordinator.

## 2024-10-17 NOTE — PROGRESS NOTES
Subjective:       Patient ID: Socorro Lucas is a 73 y.o. female.    Chief Complaint: SPT change     This is a 73 y.o.  female patient that is an established patient to the clinic. This is a patient of Dr. Riley who had a SPT placed on 2/12/24. Patient has a history of recurrent UTIs, nephrolithiasis, and urinary retention requiring a chronic chino catheter. Patient is here today for SPT change. Endorses no other complaints today.    Lab Results   Component Value Date    CREATININE 0.8 04/12/2024       ---  PMH/PSH/Medications/Allergies/Social history reviewed and as in chart.    Review of Systems   Constitutional:  Negative for chills and fever.   Respiratory:  Negative for shortness of breath.    Cardiovascular:  Negative for chest pain and palpitations.   Gastrointestinal:  Negative for abdominal pain, constipation and diarrhea.   Genitourinary:  Positive for difficulty urinating (SPT). Negative for dysuria, flank pain, frequency, hematuria, pelvic pain, urgency and vaginal pain.   Neurological:  Negative for dizziness and weakness.   Psychiatric/Behavioral:  Negative for agitation, confusion and sleep disturbance.        Objective:      Physical Exam  HENT:      Head: Normocephalic.   Pulmonary:      Effort: Pulmonary effort is normal.   Abdominal:      General: Abdomen is flat.      Palpations: Abdomen is soft.   Genitourinary:     Comments: Suprapubic catheter in place, 50cc of sterile water inserted into the SPT, balloon deflated and SPT removed without difficulty, cleaned SPT insertion site with betadine then used sterile technique to insert a new 16fr SPT, urine seen and balloon inflated with 10cc, irrigated SPT tube with 50cc of sterile water, olayinka back easily but light pink in color, irrigated another 50cc of sterile water and dew back clear yellow urine without difficulty, connected to chino leg bag and secured SPT tube to upper right thigh.   Some blood noted when removing old SPT and placing a  new SPT.    Musculoskeletal:         General: Normal range of motion.      Cervical back: Normal range of motion.   Skin:     General: Skin is warm and dry.   Neurological:      Mental Status: She is alert and oriented to person, place, and time.       Assessment:     Problem Noted   Encounter for Suprapubic Catheter Care 3/12/2024    SPT changed 6/17/24     Urinary Retention 4/12/2022    , 280 and on cystoscopy, has BL reflux   Cath placed 4/4/22  UDS 4/22:  No void, normal compliance bilateral grade 1 reflux  SPT placed 2/12/24     S/P Urological Surgery (Resolved) 3/12/2024       Plan:     Suprapubic catheter in place, 50cc of sterile water inserted into the SPT, balloon deflated and SPT removed without difficulty, cleaned SPT insertion site with betadine then used sterile technique to insert a new 16fr SPT, urine seen and balloon inflated with 10cc, irrigated SPT tube with 50cc of sterile water, olayinka back easily but light pink in color, irrigated another 50cc of sterile water and dew back clear yellow urine without difficulty, connected to chino leg bag and secured SPT tube to upper right thigh.   Drink at least 2L of water per day.  Follow-up 1 month for SPT change.    HARJIT Tafoya    Time spent was 20 minutes. This includes face to face time and non-face to face time preparing to see the patient (eg, review of tests), obtaining and/or reviewing separately obtained history, documenting clinical information in the electronic or other health record, independently interpreting results and communicating results to the patient/family/caregiver, or care coordinator.

## 2024-10-18 ENCOUNTER — OFFICE VISIT (OUTPATIENT)
Dept: UROLOGY | Facility: CLINIC | Age: 74
End: 2024-10-18
Payer: MEDICARE

## 2024-10-18 ENCOUNTER — LAB VISIT (OUTPATIENT)
Dept: LAB | Facility: HOSPITAL | Age: 74
End: 2024-10-18
Attending: INTERNAL MEDICINE
Payer: MEDICARE

## 2024-10-18 VITALS
HEIGHT: 61 IN | DIASTOLIC BLOOD PRESSURE: 80 MMHG | BODY MASS INDEX: 33.51 KG/M2 | SYSTOLIC BLOOD PRESSURE: 148 MMHG | HEART RATE: 65 BPM

## 2024-10-18 DIAGNOSIS — N18.31 STAGE 3A CHRONIC KIDNEY DISEASE: ICD-10-CM

## 2024-10-18 DIAGNOSIS — Z43.5 ENCOUNTER FOR SUPRAPUBIC CATHETER CARE: Primary | ICD-10-CM

## 2024-10-18 DIAGNOSIS — N25.81 SECONDARY HYPERPARATHYROIDISM OF RENAL ORIGIN: ICD-10-CM

## 2024-10-18 DIAGNOSIS — D63.1 ANEMIA IN STAGE 3A CHRONIC KIDNEY DISEASE: ICD-10-CM

## 2024-10-18 DIAGNOSIS — E55.9 VITAMIN D DEFICIENCY: ICD-10-CM

## 2024-10-18 DIAGNOSIS — N18.31 ANEMIA IN STAGE 3A CHRONIC KIDNEY DISEASE: ICD-10-CM

## 2024-10-18 LAB
25(OH)D3+25(OH)D2 SERPL-MCNC: 37 NG/ML (ref 30–96)
ALBUMIN SERPL BCP-MCNC: 3.6 G/DL (ref 3.5–5.2)
ANION GAP SERPL CALC-SCNC: 12 MMOL/L (ref 8–16)
BASOPHILS # BLD AUTO: 0.03 K/UL (ref 0–0.2)
BASOPHILS NFR BLD: 0.4 % (ref 0–1.9)
BUN SERPL-MCNC: 18 MG/DL (ref 8–23)
CALCIUM SERPL-MCNC: 10.5 MG/DL (ref 8.7–10.5)
CHLORIDE SERPL-SCNC: 107 MMOL/L (ref 95–110)
CO2 SERPL-SCNC: 24 MMOL/L (ref 23–29)
CREAT SERPL-MCNC: 1 MG/DL (ref 0.5–1.4)
DIFFERENTIAL METHOD BLD: ABNORMAL
EOSINOPHIL # BLD AUTO: 0.2 K/UL (ref 0–0.5)
EOSINOPHIL NFR BLD: 2.4 % (ref 0–8)
ERYTHROCYTE [DISTWIDTH] IN BLOOD BY AUTOMATED COUNT: 13.2 % (ref 11.5–14.5)
EST. GFR  (NO RACE VARIABLE): 59 ML/MIN/1.73 M^2
FERRITIN SERPL-MCNC: 65 NG/ML (ref 20–300)
GLUCOSE SERPL-MCNC: 86 MG/DL (ref 70–110)
HCT VFR BLD AUTO: 44.7 % (ref 37–48.5)
HGB BLD-MCNC: 14 G/DL (ref 12–16)
IMM GRANULOCYTES # BLD AUTO: 0.03 K/UL (ref 0–0.04)
IMM GRANULOCYTES NFR BLD AUTO: 0.4 % (ref 0–0.5)
IRON SERPL-MCNC: 76 UG/DL (ref 30–160)
LYMPHOCYTES # BLD AUTO: 1.6 K/UL (ref 1–4.8)
LYMPHOCYTES NFR BLD: 19.7 % (ref 18–48)
MAGNESIUM SERPL-MCNC: 1.9 MG/DL (ref 1.6–2.6)
MCH RBC QN AUTO: 30.4 PG (ref 27–31)
MCHC RBC AUTO-ENTMCNC: 31.3 G/DL (ref 32–36)
MCV RBC AUTO: 97 FL (ref 82–98)
MONOCYTES # BLD AUTO: 0.7 K/UL (ref 0.3–1)
MONOCYTES NFR BLD: 8.6 % (ref 4–15)
NEUTROPHILS # BLD AUTO: 5.4 K/UL (ref 1.8–7.7)
NEUTROPHILS NFR BLD: 68.5 % (ref 38–73)
NRBC BLD-RTO: 0 /100 WBC
PHOSPHATE SERPL-MCNC: 3.5 MG/DL (ref 2.7–4.5)
PLATELET # BLD AUTO: 178 K/UL (ref 150–450)
PMV BLD AUTO: 10.7 FL (ref 9.2–12.9)
POTASSIUM SERPL-SCNC: 5.1 MMOL/L (ref 3.5–5.1)
PTH-INTACT SERPL-MCNC: 63.3 PG/ML (ref 9–77)
RBC # BLD AUTO: 4.61 M/UL (ref 4–5.4)
SATURATED IRON: 20 % (ref 20–50)
SODIUM SERPL-SCNC: 143 MMOL/L (ref 136–145)
TOTAL IRON BINDING CAPACITY: 373 UG/DL (ref 250–450)
TRANSFERRIN SERPL-MCNC: 252 MG/DL (ref 200–375)
URATE SERPL-MCNC: 5.3 MG/DL (ref 2.4–5.7)
WBC # BLD AUTO: 7.92 K/UL (ref 3.9–12.7)

## 2024-10-18 PROCEDURE — 82728 ASSAY OF FERRITIN: CPT | Performed by: INTERNAL MEDICINE

## 2024-10-18 PROCEDURE — 83970 ASSAY OF PARATHORMONE: CPT | Performed by: INTERNAL MEDICINE

## 2024-10-18 PROCEDURE — 83735 ASSAY OF MAGNESIUM: CPT | Performed by: INTERNAL MEDICINE

## 2024-10-18 PROCEDURE — 85025 COMPLETE CBC W/AUTO DIFF WBC: CPT | Performed by: INTERNAL MEDICINE

## 2024-10-18 PROCEDURE — 84550 ASSAY OF BLOOD/URIC ACID: CPT | Performed by: INTERNAL MEDICINE

## 2024-10-18 PROCEDURE — 80069 RENAL FUNCTION PANEL: CPT | Performed by: INTERNAL MEDICINE

## 2024-10-18 PROCEDURE — 82306 VITAMIN D 25 HYDROXY: CPT | Performed by: INTERNAL MEDICINE

## 2024-10-18 PROCEDURE — 99999 PR PBB SHADOW E&M-EST. PATIENT-LVL III: CPT | Mod: PBBFAC,,,

## 2024-10-18 PROCEDURE — 83540 ASSAY OF IRON: CPT | Performed by: INTERNAL MEDICINE

## 2024-10-18 PROCEDURE — 36415 COLL VENOUS BLD VENIPUNCTURE: CPT | Performed by: INTERNAL MEDICINE

## 2024-10-18 PROCEDURE — 84466 ASSAY OF TRANSFERRIN: CPT | Performed by: INTERNAL MEDICINE

## 2024-11-15 ENCOUNTER — OFFICE VISIT (OUTPATIENT)
Dept: UROLOGY | Facility: CLINIC | Age: 74
End: 2024-11-15
Payer: MEDICARE

## 2024-11-15 VITALS
HEART RATE: 60 BPM | HEIGHT: 61 IN | SYSTOLIC BLOOD PRESSURE: 156 MMHG | BODY MASS INDEX: 33.88 KG/M2 | DIASTOLIC BLOOD PRESSURE: 83 MMHG | WEIGHT: 179.44 LBS

## 2024-11-15 DIAGNOSIS — Z43.5 ENCOUNTER FOR SUPRAPUBIC CATHETER CARE: Primary | ICD-10-CM

## 2024-11-15 PROCEDURE — 99999 PR PBB SHADOW E&M-EST. PATIENT-LVL III: CPT | Mod: PBBFAC,,,

## 2024-11-15 NOTE — PROGRESS NOTES
Subjective:       Patient ID: Socorro Lucas is a 73 y.o. female.    Chief Complaint: SPT change     This is a 73 y.o.  female patient that is an established patient to the clinic. This is a patient of Dr. Riley who had a SPT placed on 2/12/24. Patient has a history of recurrent UTIs, nephrolithiasis, and urinary retention requiring a chronic chino catheter. Patient is here today for SPT change. Endorses no other complaints today.    Lab Results   Component Value Date    CREATININE 1.0 10/18/2024       ---  PMH/PSH/Medications/Allergies/Social history reviewed and as in chart.    Review of Systems   Constitutional:  Negative for chills and fever.   Respiratory:  Negative for shortness of breath.    Cardiovascular:  Negative for chest pain and palpitations.   Gastrointestinal:  Negative for abdominal pain, constipation and diarrhea.   Genitourinary:  Positive for difficulty urinating (SPT). Negative for dysuria, flank pain, frequency, hematuria, pelvic pain, urgency and vaginal pain.   Neurological:  Negative for dizziness and weakness.   Psychiatric/Behavioral:  Negative for agitation, confusion and sleep disturbance.        Objective:      Physical Exam  HENT:      Head: Normocephalic.   Pulmonary:      Effort: Pulmonary effort is normal.   Abdominal:      General: Abdomen is flat.      Palpations: Abdomen is soft.   Genitourinary:     Comments: Suprapubic catheter in place, 50cc of sterile water inserted into the SPT, balloon deflated and SPT removed without difficulty, cleaned SPT insertion site with betadine then used sterile technique to insert a new 16fr SPT, urine seen and balloon inflated with 10cc, irrigated SPT tube with 50cc of sterile water, olayinka back easily but light pink in color, irrigated another 50cc of sterile water and dew back clear yellow urine without difficulty, connected to chino leg bag and secured SPT tube to upper right thigh.       Musculoskeletal:         General: Normal range of  motion.      Cervical back: Normal range of motion.   Skin:     General: Skin is warm and dry.   Neurological:      Mental Status: She is alert and oriented to person, place, and time.       Assessment:     Problem Noted   Encounter for Suprapubic Catheter Care 3/12/2024    SPT changed 6/17/24     Urinary Retention 4/12/2022    , 280 and on cystoscopy, has BL reflux   Cath placed 4/4/22  UDS 4/22:  No void, normal compliance bilateral grade 1 reflux  SPT placed 2/12/24     S/P Urological Surgery (Resolved) 3/12/2024       Plan:     Suprapubic catheter in place, 50cc of sterile water inserted into the SPT, balloon deflated and SPT removed without difficulty, cleaned SPT insertion site with betadine then used sterile technique to insert a new 16fr SPT, urine seen and balloon inflated with 10cc, irrigated SPT tube with 50cc of sterile water, olayinka back easily but light pink in color, irrigated another 50cc of sterile water and dew back clear yellow urine without difficulty, connected to chino leg bag and secured SPT tube to upper right thigh.   Drink at least 2L of water per day.  Follow-up 1 month for SPT change.    HARJIT Tafoya    Time spent was 20 minutes. This includes face to face time and non-face to face time preparing to see the patient (eg, review of tests), obtaining and/or reviewing separately obtained history, documenting clinical information in the electronic or other health record, independently interpreting results and communicating results to the patient/family/caregiver, or care coordinator.

## 2024-12-03 ENCOUNTER — HOSPITAL ENCOUNTER (OUTPATIENT)
Dept: RADIOLOGY | Facility: HOSPITAL | Age: 74
Discharge: HOME | End: 2024-12-03
Attending: INTERNAL MEDICINE
Payer: MEDICARE

## 2024-12-03 DIAGNOSIS — E11.22 CHRONIC KIDNEY DISEASE DUE TO TYPE 2 DIABETES MELLITUS: ICD-10-CM

## 2024-12-03 PROCEDURE — 76770 US EXAM ABDO BACK WALL COMP: CPT | Mod: 26,,, | Performed by: RADIOLOGY

## 2024-12-03 PROCEDURE — 76770 US EXAM ABDO BACK WALL COMP: CPT | Mod: TC

## 2024-12-16 ENCOUNTER — OFFICE VISIT (OUTPATIENT)
Dept: UROLOGY | Facility: CLINIC | Age: 74
End: 2024-12-16
Payer: MEDICARE

## 2024-12-16 VITALS — HEART RATE: 73 BPM | DIASTOLIC BLOOD PRESSURE: 92 MMHG | SYSTOLIC BLOOD PRESSURE: 158 MMHG

## 2024-12-16 DIAGNOSIS — Z43.5 ENCOUNTER FOR SUPRAPUBIC CATHETER CARE: Primary | ICD-10-CM

## 2024-12-16 PROCEDURE — 1126F AMNT PAIN NOTED NONE PRSNT: CPT | Mod: CPTII,S$GLB,,

## 2024-12-16 PROCEDURE — 4010F ACE/ARB THERAPY RXD/TAKEN: CPT | Mod: CPTII,S$GLB,,

## 2024-12-16 PROCEDURE — 99212 OFFICE O/P EST SF 10 MIN: CPT | Mod: S$GLB,,,

## 2024-12-16 PROCEDURE — 3077F SYST BP >= 140 MM HG: CPT | Mod: CPTII,S$GLB,,

## 2024-12-16 PROCEDURE — 1159F MED LIST DOCD IN RCRD: CPT | Mod: CPTII,S$GLB,,

## 2024-12-16 PROCEDURE — 3066F NEPHROPATHY DOC TX: CPT | Mod: CPTII,S$GLB,,

## 2024-12-16 PROCEDURE — 3080F DIAST BP >= 90 MM HG: CPT | Mod: CPTII,S$GLB,,

## 2024-12-16 PROCEDURE — 1160F RVW MEDS BY RX/DR IN RCRD: CPT | Mod: CPTII,S$GLB,,

## 2024-12-16 PROCEDURE — 3060F POS MICROALBUMINURIA REV: CPT | Mod: CPTII,S$GLB,,

## 2024-12-16 PROCEDURE — 99999 PR PBB SHADOW E&M-EST. PATIENT-LVL III: CPT | Mod: PBBFAC,,,

## 2024-12-16 NOTE — PROGRESS NOTES
Subjective:       Patient ID: Socorro Lucas is a 74 y.o. female.    Chief Complaint: SPT change     This is a 74 y.o.  female patient that is an established patient to the clinic. This is a patient of Dr. Riley who had a SPT placed on 2/12/24. Patient has a history of recurrent UTIs, nephrolithiasis, and urinary retention requiring a chronic chino catheter. Patient is here today for SPT change. Endorses no other complaints today.    Lab Results   Component Value Date    CREATININE 1.0 10/18/2024       ---  PMH/PSH/Medications/Allergies/Social history reviewed and as in chart.    Review of Systems   Constitutional:  Negative for chills and fever.   Respiratory:  Negative for shortness of breath.    Cardiovascular:  Negative for chest pain and palpitations.   Gastrointestinal:  Negative for abdominal pain, constipation and diarrhea.   Genitourinary:  Positive for difficulty urinating (SPT). Negative for dysuria, flank pain, frequency, hematuria, pelvic pain, urgency and vaginal pain.   Neurological:  Negative for dizziness and weakness.   Psychiatric/Behavioral:  Negative for agitation, confusion and sleep disturbance.        Objective:      Physical Exam  HENT:      Head: Normocephalic.   Pulmonary:      Effort: Pulmonary effort is normal.   Abdominal:      General: Abdomen is flat.      Palpations: Abdomen is soft.   Genitourinary:     Comments: Suprapubic catheter in place, 50cc of sterile water inserted into the SPT, balloon deflated and SPT removed without difficulty, cleaned SPT insertion site with betadine then used sterile technique to insert a new 16fr SPT, urine seen and balloon inflated with 10cc, irrigated SPT tube with 50cc of sterile water, olayinka back easily but light pink in color, irrigated another 50cc of sterile water and dew back clear yellow urine without difficulty, connected to chino leg bag and secured SPT tube to upper right thigh.       Musculoskeletal:         General: Normal range of  motion.      Cervical back: Normal range of motion.   Skin:     General: Skin is warm and dry.   Neurological:      Mental Status: She is alert and oriented to person, place, and time.       Assessment:     Problem Noted   Encounter for Suprapubic Catheter Care 3/12/2024    SPT changed 6/17/24     Urinary Retention 4/12/2022    , 280 and on cystoscopy, has BL reflux   Cath placed 4/4/22  UDS 4/22:  No void, normal compliance bilateral grade 1 reflux  SPT placed 2/12/24     S/P Urological Surgery (Resolved) 3/12/2024       Plan:     Suprapubic catheter in place, 50cc of sterile water inserted into the SPT, balloon deflated and SPT removed without difficulty, cleaned SPT insertion site with betadine then used sterile technique to insert a new 16fr SPT, urine seen and balloon inflated with 10cc, irrigated SPT tube with 50cc of sterile water, olayinka back easily but light pink in color, irrigated another 50cc of sterile water and dew back clear yellow urine without difficulty, connected to chino leg bag and secured SPT tube to upper right thigh.   Drink at least 2L of water per day.  Follow-up 1 month for SPT change.    HARJIT Tafoay    Time spent was 20 minutes. This includes face to face time and non-face to face time preparing to see the patient (eg, review of tests), obtaining and/or reviewing separately obtained history, documenting clinical information in the electronic or other health record, independently interpreting results and communicating results to the patient/family/caregiver, or care coordinator.

## 2025-01-16 ENCOUNTER — OFFICE VISIT (OUTPATIENT)
Dept: UROLOGY | Facility: CLINIC | Age: 75
End: 2025-01-16
Payer: MEDICARE

## 2025-01-16 VITALS
BODY MASS INDEX: 34.01 KG/M2 | HEART RATE: 65 BPM | HEIGHT: 61 IN | DIASTOLIC BLOOD PRESSURE: 71 MMHG | SYSTOLIC BLOOD PRESSURE: 132 MMHG

## 2025-01-16 DIAGNOSIS — Z43.5 ENCOUNTER FOR SUPRAPUBIC CATHETER CARE: Primary | ICD-10-CM

## 2025-01-16 PROCEDURE — 3078F DIAST BP <80 MM HG: CPT | Mod: CPTII,S$GLB,,

## 2025-01-16 PROCEDURE — 1160F RVW MEDS BY RX/DR IN RCRD: CPT | Mod: CPTII,S$GLB,,

## 2025-01-16 PROCEDURE — 51705 CHANGE OF BLADDER TUBE: CPT | Mod: S$GLB,,,

## 2025-01-16 PROCEDURE — 1159F MED LIST DOCD IN RCRD: CPT | Mod: CPTII,S$GLB,,

## 2025-01-16 PROCEDURE — 3008F BODY MASS INDEX DOCD: CPT | Mod: CPTII,S$GLB,,

## 2025-01-16 PROCEDURE — 1126F AMNT PAIN NOTED NONE PRSNT: CPT | Mod: CPTII,S$GLB,,

## 2025-01-16 PROCEDURE — 99499 UNLISTED E&M SERVICE: CPT | Mod: S$GLB,,,

## 2025-01-16 PROCEDURE — 3075F SYST BP GE 130 - 139MM HG: CPT | Mod: CPTII,S$GLB,,

## 2025-01-16 PROCEDURE — 99999 PR PBB SHADOW E&M-EST. PATIENT-LVL III: CPT | Mod: PBBFAC,,,

## 2025-01-16 NOTE — PROGRESS NOTES
Subjective:       Patient ID: Socorro Lucas is a 74 y.o. female.    Chief Complaint: SPT change     This is a 74 y.o.  female patient that is an established patient to the clinic. This is a patient of Dr. Riley who had a SPT placed on 2/12/24. Patient has a history of recurrent UTIs, nephrolithiasis, and urinary retention requiring a chronic chino catheter.   Patient is here today for SPT change. Endorses no other complaints today.    Lab Results   Component Value Date    CREATININE 1.0 10/18/2024       ---  PMH/PSH/Medications/Allergies/Social history reviewed and as in chart.    Review of Systems   Constitutional:  Negative for chills and fever.   Respiratory:  Negative for shortness of breath.    Cardiovascular:  Negative for chest pain and palpitations.   Gastrointestinal:  Negative for abdominal pain, constipation and diarrhea.   Genitourinary:  Positive for difficulty urinating (SPT). Negative for dysuria, flank pain, frequency, hematuria, pelvic pain, urgency and vaginal pain.   Neurological:  Negative for dizziness and weakness.   Psychiatric/Behavioral:  Negative for agitation, confusion and sleep disturbance.        Objective:      Physical Exam  HENT:      Head: Normocephalic.   Pulmonary:      Effort: Pulmonary effort is normal.   Abdominal:      General: Abdomen is flat.      Palpations: Abdomen is soft.   Genitourinary:     Comments: Suprapubic catheter in place, 50cc of sterile water inserted into the SPT, balloon deflated and SPT removed without difficulty, cleaned SPT insertion site with betadine then used sterile technique to insert a new 16fr SPT, urine seen and balloon inflated with 10cc, irrigated SPT tube with 50cc of sterile water, olayikna back easily but light pink in color, irrigated another 50cc of sterile water and dew back clear yellow urine without difficulty, connected to chino leg bag and secured SPT tube to upper right thigh.       Musculoskeletal:         General: Normal range of  motion.      Cervical back: Normal range of motion.   Skin:     General: Skin is warm and dry.   Neurological:      Mental Status: She is alert and oriented to person, place, and time.       Assessment:     Problem Noted   Encounter for Suprapubic Catheter Care 3/12/2024    SPT changed 6/17/24     Urinary Retention 4/12/2022    , 280 and on cystoscopy, has BL reflux   Cath placed 4/4/22  UDS 4/22:  No void, normal compliance bilateral grade 1 reflux  SPT placed 2/12/24     S/P Urological Surgery (Resolved) 3/12/2024       Plan:     Suprapubic catheter in place, 50cc of sterile water inserted into the SPT, balloon deflated and SPT removed without difficulty, cleaned SPT insertion site with betadine then used sterile technique to insert a new 16fr SPT, urine seen and balloon inflated with 10cc, irrigated SPT tube with 50cc of sterile water, olayinka back easily but light pink in color, irrigated another 50cc of sterile water and dew back clear yellow urine without difficulty, connected to chino leg bag and secured SPT tube to upper right thigh.   Drink at least 2L of water per day.  Follow-up 1 month for SPT change.    HARJIT Tafoya    Time spent was 20 minutes. This includes face to face time and non-face to face time preparing to see the patient (eg, review of tests), obtaining and/or reviewing separately obtained history, documenting clinical information in the electronic or other health record, independently interpreting results and communicating results to the patient/family/caregiver, or care coordinator.

## 2025-02-10 ENCOUNTER — LAB VISIT (OUTPATIENT)
Dept: LAB | Facility: HOSPITAL | Age: 75
End: 2025-02-10
Attending: INTERNAL MEDICINE
Payer: MEDICARE

## 2025-02-10 DIAGNOSIS — N25.81 SECONDARY HYPERPARATHYROIDISM OF RENAL ORIGIN: ICD-10-CM

## 2025-02-10 DIAGNOSIS — N20.0 KIDNEY STONE: ICD-10-CM

## 2025-02-10 DIAGNOSIS — E11.22 CHRONIC KIDNEY DISEASE DUE TO TYPE 2 DIABETES MELLITUS: ICD-10-CM

## 2025-02-10 LAB
ALBUMIN SERPL BCP-MCNC: 3.5 G/DL (ref 3.5–5.2)
ANION GAP SERPL CALC-SCNC: 10 MMOL/L (ref 8–16)
BASOPHILS # BLD AUTO: 0.03 K/UL (ref 0–0.2)
BASOPHILS NFR BLD: 0.5 % (ref 0–1.9)
BUN SERPL-MCNC: 19 MG/DL (ref 8–23)
CALCIUM SERPL-MCNC: 9.6 MG/DL (ref 8.7–10.5)
CHLORIDE SERPL-SCNC: 107 MMOL/L (ref 95–110)
CO2 SERPL-SCNC: 23 MMOL/L (ref 23–29)
CREAT SERPL-MCNC: 1 MG/DL (ref 0.5–1.4)
DIFFERENTIAL METHOD BLD: ABNORMAL
EOSINOPHIL # BLD AUTO: 0.2 K/UL (ref 0–0.5)
EOSINOPHIL NFR BLD: 2.9 % (ref 0–8)
ERYTHROCYTE [DISTWIDTH] IN BLOOD BY AUTOMATED COUNT: 13.1 % (ref 11.5–14.5)
EST. GFR  (NO RACE VARIABLE): 59 ML/MIN/1.73 M^2
GLUCOSE SERPL-MCNC: 99 MG/DL (ref 70–110)
HCT VFR BLD AUTO: 43.1 % (ref 37–48.5)
HGB BLD-MCNC: 13.4 G/DL (ref 12–16)
IMM GRANULOCYTES # BLD AUTO: 0.03 K/UL (ref 0–0.04)
IMM GRANULOCYTES NFR BLD AUTO: 0.5 % (ref 0–0.5)
LYMPHOCYTES # BLD AUTO: 1.4 K/UL (ref 1–4.8)
LYMPHOCYTES NFR BLD: 24.9 % (ref 18–48)
MAGNESIUM SERPL-MCNC: 1.9 MG/DL (ref 1.6–2.6)
MCH RBC QN AUTO: 30.9 PG (ref 27–31)
MCHC RBC AUTO-ENTMCNC: 31.1 G/DL (ref 32–36)
MCV RBC AUTO: 100 FL (ref 82–98)
MONOCYTES # BLD AUTO: 0.6 K/UL (ref 0.3–1)
MONOCYTES NFR BLD: 10.9 % (ref 4–15)
NEUTROPHILS # BLD AUTO: 3.5 K/UL (ref 1.8–7.7)
NEUTROPHILS NFR BLD: 60.3 % (ref 38–73)
NRBC BLD-RTO: 0 /100 WBC
PHOSPHATE SERPL-MCNC: 3 MG/DL (ref 2.7–4.5)
PLATELET # BLD AUTO: 169 K/UL (ref 150–450)
PMV BLD AUTO: 10.3 FL (ref 9.2–12.9)
POTASSIUM SERPL-SCNC: 4.8 MMOL/L (ref 3.5–5.1)
PTH-INTACT SERPL-MCNC: 77.1 PG/ML (ref 9–77)
RBC # BLD AUTO: 4.33 M/UL (ref 4–5.4)
SODIUM SERPL-SCNC: 140 MMOL/L (ref 136–145)
URATE SERPL-MCNC: 6.1 MG/DL (ref 2.4–5.7)
WBC # BLD AUTO: 5.79 K/UL (ref 3.9–12.7)

## 2025-02-10 PROCEDURE — 84550 ASSAY OF BLOOD/URIC ACID: CPT | Performed by: INTERNAL MEDICINE

## 2025-02-10 PROCEDURE — 80069 RENAL FUNCTION PANEL: CPT | Performed by: INTERNAL MEDICINE

## 2025-02-10 PROCEDURE — 85025 COMPLETE CBC W/AUTO DIFF WBC: CPT | Performed by: INTERNAL MEDICINE

## 2025-02-10 PROCEDURE — 83970 ASSAY OF PARATHORMONE: CPT | Performed by: INTERNAL MEDICINE

## 2025-02-10 PROCEDURE — 84105 ASSAY OF URINE PHOSPHORUS: CPT | Performed by: INTERNAL MEDICINE

## 2025-02-10 PROCEDURE — 36415 COLL VENOUS BLD VENIPUNCTURE: CPT | Performed by: INTERNAL MEDICINE

## 2025-02-10 PROCEDURE — 83735 ASSAY OF MAGNESIUM: CPT | Performed by: INTERNAL MEDICINE

## 2025-02-13 LAB
AMMONIA 24H UR-SRATE: 23 MMOL/24 H (ref 15–56)
BSA: ABNORMAL 1.73M(2)
CA H2 PHOS DIHYD 24H SATFR UR: -4.04 DG
CALCIUM 24H UR-MRATE: 51 MG/24 H
CAOX 24H ENGDIFF UR: 1.3 DG
CHLORIDE 24H UR-SRATE: 116 MMOL/24 H (ref 34–286)
CITRATE 24H UR-MRATE: 512 MG/24 H
CLINICAL BIOCHEMIST REVIEW: ABNORMAL
COLLECT DURATION TIME UR: 24 H
CREAT 24H UR-MRATE: 1275 MG/24 H (ref 603–1783)
HYDROXYAPATITE 24H ENGDIFF UR: -2.3 DG
MAGNESIUM 24H UR-MRATE: 68 MG/24 H (ref 51–269)
OSMOLALITY 24H UR: 415 MOSM/KG (ref 150–1150)
OXALATE 24H UR-MRATE: 42.2 MG/24 H (ref 9.7–40.5)
OXALATE 24H UR-SRATE: 0.48 MMOL/24 H (ref 0.11–0.46)
PH 24H UR: 4.9 [PH] (ref 4.5–8)
PHOSPHATE 24H UR-MRATE: 952 MG/24 H (ref 226–1797)
POTASSIUM 24H UR-SRATE: 43 MMOL/24 H (ref 16–105)
PROTEIN CATABOLIC RATE, URINE: 73 G/24 H (ref 56–125)
SODIUM 24H UR-SRATE: 150 MMOL/24 H (ref 22–328)
SPECIMEN VOL 24H UR: 1700 ML
SULFATE 24H UR-SRATE: 6 MMOL/24 H (ref 7–47)
URATE 24H SATFR UR: 3.35 DG
URATE 24H UR-MRATE: 374 MG/24 H (ref 250–750)
UUN 24H UR-MRATE: 7.6 G/24 H (ref 7–42)

## 2025-02-17 NOTE — PROGRESS NOTES
Subjective:       Patient ID: Socorro Lucas is a 74 y.o. female.    Chief Complaint: SPT change     This is a 74 y.o.  female patient that is an established patient to the clinic. This is a patient of Dr. Riley who had a SPT placed on 2/12/24. Patient has a history of recurrent UTIs, nephrolithiasis, and urinary retention requiring a chronic chino catheter.   Patient is here today for SPT change. Endorses no other complaints today.    Lab Results   Component Value Date    CREATININE 1.0 02/10/2025       ---  PMH/PSH/Medications/Allergies/Social history reviewed and as in chart.    Review of Systems   Constitutional:  Negative for chills and fever.   Respiratory:  Negative for shortness of breath.    Cardiovascular:  Negative for chest pain and palpitations.   Gastrointestinal:  Negative for abdominal pain, constipation and diarrhea.   Genitourinary:  Positive for difficulty urinating (SPT). Negative for dysuria, flank pain, frequency, hematuria, pelvic pain, urgency and vaginal pain.   Neurological:  Negative for dizziness and weakness.   Psychiatric/Behavioral:  Negative for agitation, confusion and sleep disturbance.        Objective:      Physical Exam  HENT:      Head: Normocephalic.   Pulmonary:      Effort: Pulmonary effort is normal.   Abdominal:      General: Abdomen is flat.      Palpations: Abdomen is soft.   Genitourinary:     Comments: Suprapubic catheter in place, 50cc of sterile water inserted into the SPT, balloon deflated and SPT removed without difficulty, cleaned SPT insertion site with betadine then used sterile technique to insert a new 16fr SPT, urine seen and balloon inflated with 10cc, irrigated SPT tube with 50cc of sterile water, olayinka back easily but light pink in color, irrigated another 50cc of sterile water and dew back clear yellow urine without difficulty, connected to chino leg bag and secured SPT tube to upper right thigh.       Musculoskeletal:         General: Normal range of  motion.      Cervical back: Normal range of motion.   Skin:     General: Skin is warm and dry.   Neurological:      Mental Status: She is alert and oriented to person, place, and time.         Assessment:     Problem Noted   Encounter for Suprapubic Catheter Care 3/12/2024    SPT changed 6/17/24     Urinary Retention 4/12/2022    , 280 and on cystoscopy, has BL reflux   Cath placed 4/4/22  UDS 4/22:  No void, normal compliance bilateral grade 1 reflux  SPT placed 2/12/24     S/P Urological Surgery (Resolved) 3/12/2024       Plan:     Suprapubic catheter in place, 50cc of sterile water inserted into the SPT, balloon deflated and SPT removed without difficulty, cleaned SPT insertion site with betadine then used sterile technique to insert a new 16fr SPT, urine seen and balloon inflated with 10cc, irrigated SPT tube with 50cc of sterile water, olayinka back easily but light pink in color, irrigated another 50cc of sterile water and dew back clear yellow urine without difficulty, connected to chino leg bag and secured SPT tube to upper right thigh.   Drink at least 2L of water per day.  Follow-up 1 month for SPT change.    HARJIT Tafoya    Time spent was 20 minutes. This includes face to face time and non-face to face time preparing to see the patient (eg, review of tests), obtaining and/or reviewing separately obtained history, documenting clinical information in the electronic or other health record, independently interpreting results and communicating results to the patient/family/caregiver, or care coordinator.

## 2025-02-18 ENCOUNTER — OFFICE VISIT (OUTPATIENT)
Dept: UROLOGY | Facility: CLINIC | Age: 75
End: 2025-02-18
Payer: MEDICARE

## 2025-02-18 VITALS — SYSTOLIC BLOOD PRESSURE: 161 MMHG | HEART RATE: 65 BPM | DIASTOLIC BLOOD PRESSURE: 77 MMHG

## 2025-02-18 DIAGNOSIS — Z43.5 ENCOUNTER FOR SUPRAPUBIC CATHETER CARE: Primary | ICD-10-CM

## 2025-02-18 RX ORDER — LISINOPRIL 40 MG/1
1 TABLET ORAL DAILY
COMMUNITY

## 2025-03-18 ENCOUNTER — OFFICE VISIT (OUTPATIENT)
Dept: UROLOGY | Facility: CLINIC | Age: 75
End: 2025-03-18
Payer: MEDICARE

## 2025-03-18 VITALS
SYSTOLIC BLOOD PRESSURE: 151 MMHG | DIASTOLIC BLOOD PRESSURE: 80 MMHG | HEIGHT: 61 IN | BODY MASS INDEX: 32.5 KG/M2 | HEART RATE: 93 BPM

## 2025-03-18 DIAGNOSIS — Z43.5 ENCOUNTER FOR SUPRAPUBIC CATHETER CARE: Primary | ICD-10-CM

## 2025-03-18 PROCEDURE — 99999 PR PBB SHADOW E&M-EST. PATIENT-LVL III: CPT | Mod: PBBFAC,,,

## 2025-04-21 ENCOUNTER — OFFICE VISIT (OUTPATIENT)
Dept: UROLOGY | Facility: CLINIC | Age: 75
End: 2025-04-21
Payer: MEDICARE

## 2025-04-21 VITALS
DIASTOLIC BLOOD PRESSURE: 54 MMHG | BODY MASS INDEX: 32.5 KG/M2 | HEIGHT: 61 IN | HEART RATE: 55 BPM | SYSTOLIC BLOOD PRESSURE: 140 MMHG

## 2025-04-21 DIAGNOSIS — Z43.5 ENCOUNTER FOR SUPRAPUBIC CATHETER CARE: Primary | ICD-10-CM

## 2025-04-21 PROCEDURE — 3008F BODY MASS INDEX DOCD: CPT | Mod: CPTII,S$GLB,,

## 2025-04-21 PROCEDURE — 99499 UNLISTED E&M SERVICE: CPT | Mod: S$GLB,,,

## 2025-04-21 PROCEDURE — 1160F RVW MEDS BY RX/DR IN RCRD: CPT | Mod: CPTII,S$GLB,,

## 2025-04-21 PROCEDURE — 1126F AMNT PAIN NOTED NONE PRSNT: CPT | Mod: CPTII,S$GLB,,

## 2025-04-21 PROCEDURE — 99999 PR PBB SHADOW E&M-EST. PATIENT-LVL III: CPT | Mod: PBBFAC,,,

## 2025-04-21 PROCEDURE — 1159F MED LIST DOCD IN RCRD: CPT | Mod: CPTII,S$GLB,,

## 2025-04-21 PROCEDURE — 3066F NEPHROPATHY DOC TX: CPT | Mod: CPTII,S$GLB,,

## 2025-04-21 PROCEDURE — 3078F DIAST BP <80 MM HG: CPT | Mod: CPTII,S$GLB,,

## 2025-04-21 PROCEDURE — 4010F ACE/ARB THERAPY RXD/TAKEN: CPT | Mod: CPTII,S$GLB,,

## 2025-04-21 PROCEDURE — 3060F POS MICROALBUMINURIA REV: CPT | Mod: CPTII,S$GLB,,

## 2025-04-21 PROCEDURE — 3077F SYST BP >= 140 MM HG: CPT | Mod: CPTII,S$GLB,,

## 2025-04-21 PROCEDURE — 51705 CHANGE OF BLADDER TUBE: CPT | Mod: S$GLB,,,

## 2025-04-21 NOTE — PROGRESS NOTES
Subjective:       Patient ID: Scoorro Lucas is a 74 y.o. female.    Chief Complaint: SPT change     This is a 74 y.o.  female patient that is an established patient to the clinic. This is a patient of Dr. Riley who had a SPT placed on 2/12/24. Patient has a history of recurrent UTIs, nephrolithiasis, and urinary retention requiring a chronic chnio catheter.   Patient is here today for SPT change. Endorses no other complaints today.      Lab Results   Component Value Date    CREATININE 1.0 02/10/2025       ---  PMH/PSH/Medications/Allergies/Social history reviewed and as in chart.    Review of Systems   Constitutional:  Negative for chills and fever.   Respiratory:  Negative for shortness of breath.    Cardiovascular:  Negative for chest pain and palpitations.   Gastrointestinal:  Negative for abdominal pain, constipation and diarrhea.   Genitourinary:  Positive for difficulty urinating (SPT). Negative for dysuria, flank pain, frequency, hematuria, pelvic pain, urgency and vaginal pain.   Neurological:  Negative for dizziness and weakness.   Psychiatric/Behavioral:  Negative for agitation, confusion and sleep disturbance.        Objective:      Physical Exam  HENT:      Head: Normocephalic.   Pulmonary:      Effort: Pulmonary effort is normal.   Abdominal:      General: Abdomen is flat.      Palpations: Abdomen is soft.   Genitourinary:     Comments: Suprapubic catheter in place, 50cc of sterile water inserted into the SPT, balloon deflated and SPT removed without difficulty, cleaned SPT insertion site with betadine then used sterile technique to insert a new 16fr SPT, urine seen and balloon inflated with 10cc, irrigated SPT tube with 50cc of sterile water, olayinka back easily but light pink in color, irrigated another 50cc of sterile water and dew back clear yellow urine without difficulty, connected to chino leg bag and secured SPT tube to upper right thigh.       Musculoskeletal:         General: Normal range  of motion.      Cervical back: Normal range of motion.   Skin:     General: Skin is warm and dry.   Neurological:      Mental Status: She is alert and oriented to person, place, and time.         Assessment:     Problem Noted   Encounter for Suprapubic Catheter Care 3/12/2024    SPT changed 6/17/24     Urinary Retention 4/12/2022    , 280 and on cystoscopy, has BL reflux   Cath placed 4/4/22  UDS 4/22:  No void, normal compliance bilateral grade 1 reflux  SPT placed 2/12/24     S/P Urological Surgery (Resolved) 3/12/2024       Plan:     Suprapubic catheter in place, 50cc of sterile water inserted into the SPT, balloon deflated and SPT removed without difficulty, cleaned SPT insertion site with betadine then used sterile technique to insert a new 16fr SPT, urine seen and balloon inflated with 10cc, irrigated SPT tube with 50cc of sterile water, olayinka back easily but light pink in color, irrigated another 50cc of sterile water and dew back clear yellow urine without difficulty, connected to chino leg bag and secured SPT tube to upper right thigh.   Drink at least 2L of water per day.  Follow-up 1 month for SPT change.    HARJIT Tafoya    Time spent was 20 minutes. This includes face to face time and non-face to face time preparing to see the patient (eg, review of tests), obtaining and/or reviewing separately obtained history, documenting clinical information in the electronic or other health record, independently interpreting results and communicating results to the patient/family/caregiver, or care coordinator.

## 2025-05-22 ENCOUNTER — OFFICE VISIT (OUTPATIENT)
Dept: UROLOGY | Facility: CLINIC | Age: 75
End: 2025-05-22
Payer: MEDICARE

## 2025-05-22 DIAGNOSIS — Z43.5 ENCOUNTER FOR SUPRAPUBIC CATHETER CARE: Primary | ICD-10-CM

## 2025-05-22 PROCEDURE — 99999 PR PBB SHADOW E&M-EST. PATIENT-LVL II: CPT | Mod: PBBFAC,,,

## 2025-06-13 ENCOUNTER — OFFICE VISIT (OUTPATIENT)
Dept: UROLOGY | Facility: CLINIC | Age: 75
End: 2025-06-13
Payer: MEDICARE

## 2025-06-13 VITALS
HEIGHT: 61 IN | HEART RATE: 74 BPM | SYSTOLIC BLOOD PRESSURE: 153 MMHG | WEIGHT: 183.63 LBS | DIASTOLIC BLOOD PRESSURE: 80 MMHG | BODY MASS INDEX: 34.67 KG/M2

## 2025-06-13 DIAGNOSIS — Z43.5 ENCOUNTER FOR SUPRAPUBIC CATHETER CARE: Primary | ICD-10-CM

## 2025-06-13 PROCEDURE — 99999 PR PBB SHADOW E&M-EST. PATIENT-LVL III: CPT | Mod: PBBFAC,,,

## 2025-07-14 ENCOUNTER — OFFICE VISIT (OUTPATIENT)
Dept: UROLOGY | Facility: CLINIC | Age: 75
End: 2025-07-14
Payer: MEDICARE

## 2025-07-14 VITALS
WEIGHT: 183.56 LBS | DIASTOLIC BLOOD PRESSURE: 79 MMHG | HEIGHT: 61 IN | BODY MASS INDEX: 34.66 KG/M2 | SYSTOLIC BLOOD PRESSURE: 159 MMHG

## 2025-07-14 DIAGNOSIS — Z43.5 ENCOUNTER FOR SUPRAPUBIC CATHETER CARE: Primary | ICD-10-CM

## 2025-07-14 PROCEDURE — 3066F NEPHROPATHY DOC TX: CPT | Mod: CPTII,S$GLB,,

## 2025-07-14 PROCEDURE — 1159F MED LIST DOCD IN RCRD: CPT | Mod: CPTII,S$GLB,,

## 2025-07-14 PROCEDURE — 99499 UNLISTED E&M SERVICE: CPT | Mod: S$GLB,,,

## 2025-07-14 PROCEDURE — 3008F BODY MASS INDEX DOCD: CPT | Mod: CPTII,S$GLB,,

## 2025-07-14 PROCEDURE — 4010F ACE/ARB THERAPY RXD/TAKEN: CPT | Mod: CPTII,S$GLB,,

## 2025-07-14 PROCEDURE — 3060F POS MICROALBUMINURIA REV: CPT | Mod: CPTII,S$GLB,,

## 2025-07-14 PROCEDURE — 3288F FALL RISK ASSESSMENT DOCD: CPT | Mod: CPTII,S$GLB,,

## 2025-07-14 PROCEDURE — 3078F DIAST BP <80 MM HG: CPT | Mod: CPTII,S$GLB,,

## 2025-07-14 PROCEDURE — 1101F PT FALLS ASSESS-DOCD LE1/YR: CPT | Mod: CPTII,S$GLB,,

## 2025-07-14 PROCEDURE — 99999 PR PBB SHADOW E&M-EST. PATIENT-LVL III: CPT | Mod: PBBFAC,,,

## 2025-07-14 PROCEDURE — 51705 CHANGE OF BLADDER TUBE: CPT | Mod: S$GLB,,,

## 2025-07-14 PROCEDURE — 1126F AMNT PAIN NOTED NONE PRSNT: CPT | Mod: CPTII,S$GLB,,

## 2025-07-14 PROCEDURE — 3077F SYST BP >= 140 MM HG: CPT | Mod: CPTII,S$GLB,,

## 2025-07-14 RX ORDER — KETOCONAZOLE 20 MG/G
CREAM TOPICAL 2 TIMES DAILY
COMMUNITY
Start: 2025-07-03

## 2025-07-14 NOTE — PROGRESS NOTES
Subjective:       Patient ID: Socorro Lucas is a 74 y.o. female.    Chief Complaint: SPT change     This is a 74 y.o.  female patient that is an established patient to the clinic. This is a patient of Dr. Riley who had a SPT placed on 2/12/24. Patient has a history of recurrent UTIs, nephrolithiasis, and urinary retention requiring a chronic chino catheter.   Patient is here today for SPT change. Endorses no other complaints today.      Lab Results   Component Value Date    CREATININE 1.0 02/10/2025       ---  PMH/PSH/Medications/Allergies/Social history reviewed and as in chart.    Review of Systems   Constitutional:  Negative for chills and fever.   Respiratory:  Negative for shortness of breath.    Cardiovascular:  Negative for chest pain and palpitations.   Gastrointestinal:  Negative for abdominal pain, constipation and diarrhea.   Genitourinary:  Positive for difficulty urinating (SPT). Negative for dysuria, flank pain, frequency, hematuria, pelvic pain, urgency and vaginal pain.   Neurological:  Negative for dizziness and weakness.   Psychiatric/Behavioral:  Negative for agitation, confusion and sleep disturbance.        Objective:      Physical Exam  HENT:      Head: Normocephalic.   Pulmonary:      Effort: Pulmonary effort is normal.   Abdominal:      General: Abdomen is flat.      Palpations: Abdomen is soft.   Genitourinary:     Comments: Suprapubic catheter in place, 50cc of sterile water inserted into the SPT, balloon deflated and SPT removed without difficulty, cleaned SPT insertion site with betadine then used sterile technique to insert a new 16fr SPT, urine seen and balloon inflated with 10cc, irrigated SPT tube with 50cc of sterile water, olayinka back easily, clear yellow urine, connected to chino leg bag and secured SPT tube to upper right thigh.       Musculoskeletal:         General: Normal range of motion.      Cervical back: Normal range of motion.   Skin:     General: Skin is warm and  dry.   Neurological:      Mental Status: She is alert and oriented to person, place, and time.       Assessment:     Problem Noted   Encounter for Suprapubic Catheter Care 3/12/2024    SPT changed 6/17/24     Urinary Retention 4/12/2022    , 280 and on cystoscopy, has BL reflux   Cath placed 4/4/22  UDS 4/22:  No void, normal compliance bilateral grade 1 reflux  SPT placed 2/12/24     S/P Urological Surgery (Resolved) 3/12/2024       Plan:     Suprapubic catheter in place, 50cc of sterile water inserted into the SPT, balloon deflated and SPT removed without difficulty, cleaned SPT insertion site with betadine then used sterile technique to insert a new 16fr SPT, urine seen and balloon inflated with 10cc, irrigated SPT tube with 50cc of sterile water, olayinka back easily,  clear yellow urine, connected to chino leg bag and secured SPT tube to upper right thigh.   Drink at least 2L of water per day.  Follow-up 1 month for SPT change.    HARJIT Tafoya    Time spent was 20 minutes. This includes face to face time and non-face to face time preparing to see the patient (eg, review of tests), obtaining and/or reviewing separately obtained history, documenting clinical information in the electronic or other health record, independently interpreting results and communicating results to the patient/family/caregiver, or care coordinator.

## 2025-08-13 ENCOUNTER — OFFICE VISIT (OUTPATIENT)
Dept: UROLOGY | Facility: CLINIC | Age: 75
End: 2025-08-13
Payer: MEDICARE

## 2025-08-13 VITALS
SYSTOLIC BLOOD PRESSURE: 166 MMHG | DIASTOLIC BLOOD PRESSURE: 74 MMHG | HEIGHT: 61 IN | BODY MASS INDEX: 35.05 KG/M2 | WEIGHT: 185.63 LBS | HEART RATE: 71 BPM

## 2025-08-13 DIAGNOSIS — Z43.5 ENCOUNTER FOR SUPRAPUBIC CATHETER CARE: Primary | ICD-10-CM

## 2025-08-13 PROCEDURE — 1159F MED LIST DOCD IN RCRD: CPT | Mod: CPTII,S$GLB,,

## 2025-08-13 PROCEDURE — 99499 UNLISTED E&M SERVICE: CPT | Mod: S$GLB,,,

## 2025-08-13 PROCEDURE — 1160F RVW MEDS BY RX/DR IN RCRD: CPT | Mod: CPTII,S$GLB,,

## 2025-08-13 PROCEDURE — 1101F PT FALLS ASSESS-DOCD LE1/YR: CPT | Mod: CPTII,S$GLB,,

## 2025-08-13 PROCEDURE — 1126F AMNT PAIN NOTED NONE PRSNT: CPT | Mod: CPTII,S$GLB,,

## 2025-08-13 PROCEDURE — 3008F BODY MASS INDEX DOCD: CPT | Mod: CPTII,S$GLB,,

## 2025-08-13 PROCEDURE — 3288F FALL RISK ASSESSMENT DOCD: CPT | Mod: CPTII,S$GLB,,

## 2025-08-13 PROCEDURE — 3060F POS MICROALBUMINURIA REV: CPT | Mod: CPTII,S$GLB,,

## 2025-08-13 PROCEDURE — 3078F DIAST BP <80 MM HG: CPT | Mod: CPTII,S$GLB,,

## 2025-08-13 PROCEDURE — 3077F SYST BP >= 140 MM HG: CPT | Mod: CPTII,S$GLB,,

## 2025-08-13 PROCEDURE — 99999 PR PBB SHADOW E&M-EST. PATIENT-LVL III: CPT | Mod: PBBFAC,,,

## 2025-08-13 PROCEDURE — 3066F NEPHROPATHY DOC TX: CPT | Mod: CPTII,S$GLB,,

## 2025-08-13 PROCEDURE — 4010F ACE/ARB THERAPY RXD/TAKEN: CPT | Mod: CPTII,S$GLB,,

## 2025-08-13 PROCEDURE — 51705 CHANGE OF BLADDER TUBE: CPT | Mod: S$GLB,,,

## (undated) DEVICE — SEE MEDLINE ITEM 157185

## (undated) DEVICE — GUIDE WIRE MOTION .035 X 150CM

## (undated) DEVICE — TOWEL OR DISP STRL BLUE 4/PK

## (undated) DEVICE — GAUZE SPONGE 4X4 12PLY

## (undated) DEVICE — SYR ONLY LUER LOCK 20CC

## (undated) DEVICE — GOWN POLY REINF BRTH SLV XL

## (undated) DEVICE — GLOVE SURGICAL LATEX SZ 6.5

## (undated) DEVICE — GLOVE BIOGEL SKINSENSE PI 8.0

## (undated) DEVICE — JELLY KY LUBRICATING 5G PACKET

## (undated) DEVICE — JELLY LUBRICANT STERILE 4 OZ

## (undated) DEVICE — SUPPORT ULNA NERVE PROTECTOR

## (undated) DEVICE — SEE MEDLINE ITEM 154981

## (undated) DEVICE — Device

## (undated) DEVICE — BAG LINGEMAN DRAIN UROLOGY

## (undated) DEVICE — SET IRR URLGY 2LINE UNIV SPIKE

## (undated) DEVICE — SOL IRR NACL .9% 3000ML

## (undated) DEVICE — SEE MEDLINE ITEM 157117

## (undated) DEVICE — CATH URTRL OPEN END STR TIP 5F

## (undated) DEVICE — TRAY CATH FOL SIL URIMTR 16FR

## (undated) DEVICE — CONTAINER MULTIPURPOSE/SPECIME

## (undated) DEVICE — SOL IRR WATER STRL 3000 ML

## (undated) DEVICE — BAG URINARY DRAINAGE 2000ML